# Patient Record
Sex: MALE | Race: ASIAN | NOT HISPANIC OR LATINO
[De-identification: names, ages, dates, MRNs, and addresses within clinical notes are randomized per-mention and may not be internally consistent; named-entity substitution may affect disease eponyms.]

---

## 2014-04-15 RX ORDER — GABAPENTIN 400 MG/1
1 CAPSULE ORAL
Qty: 0 | Refills: 0 | DISCHARGE
Start: 2014-04-15

## 2017-01-26 ENCOUNTER — APPOINTMENT (OUTPATIENT)
Dept: ELECTROPHYSIOLOGY | Facility: CLINIC | Age: 67
End: 2017-01-26

## 2017-02-23 ENCOUNTER — APPOINTMENT (OUTPATIENT)
Dept: ELECTROPHYSIOLOGY | Facility: CLINIC | Age: 67
End: 2017-02-23

## 2017-04-20 ENCOUNTER — APPOINTMENT (OUTPATIENT)
Dept: ELECTROPHYSIOLOGY | Facility: CLINIC | Age: 67
End: 2017-04-20

## 2017-04-20 ENCOUNTER — NON-APPOINTMENT (OUTPATIENT)
Age: 67
End: 2017-04-20

## 2017-04-20 VITALS
BODY MASS INDEX: 20.61 KG/M2 | SYSTOLIC BLOOD PRESSURE: 148 MMHG | DIASTOLIC BLOOD PRESSURE: 74 MMHG | OXYGEN SATURATION: 96 % | HEIGHT: 68 IN | WEIGHT: 136 LBS | HEART RATE: 77 BPM

## 2017-07-25 ENCOUNTER — APPOINTMENT (OUTPATIENT)
Dept: ELECTROPHYSIOLOGY | Facility: CLINIC | Age: 67
End: 2017-07-25

## 2017-11-14 ENCOUNTER — NON-APPOINTMENT (OUTPATIENT)
Age: 67
End: 2017-11-14

## 2017-11-14 ENCOUNTER — APPOINTMENT (OUTPATIENT)
Dept: ELECTROPHYSIOLOGY | Facility: CLINIC | Age: 67
End: 2017-11-14
Payer: COMMERCIAL

## 2017-11-14 VITALS
HEART RATE: 78 BPM | SYSTOLIC BLOOD PRESSURE: 162 MMHG | OXYGEN SATURATION: 98 % | HEIGHT: 68 IN | DIASTOLIC BLOOD PRESSURE: 73 MMHG | WEIGHT: 135 LBS | BODY MASS INDEX: 20.46 KG/M2

## 2017-11-14 VITALS — DIASTOLIC BLOOD PRESSURE: 87 MMHG | SYSTOLIC BLOOD PRESSURE: 159 MMHG | HEART RATE: 77 BPM

## 2017-11-14 PROCEDURE — 93284 PRGRMG EVAL IMPLANTABLE DFB: CPT

## 2018-03-16 ENCOUNTER — APPOINTMENT (OUTPATIENT)
Dept: ELECTROPHYSIOLOGY | Facility: CLINIC | Age: 68
End: 2018-03-16
Payer: COMMERCIAL

## 2018-03-16 VITALS
OXYGEN SATURATION: 98 % | HEART RATE: 81 BPM | SYSTOLIC BLOOD PRESSURE: 154 MMHG | DIASTOLIC BLOOD PRESSURE: 81 MMHG | WEIGHT: 133 LBS | BODY MASS INDEX: 20.22 KG/M2

## 2018-03-16 PROCEDURE — 93284 PRGRMG EVAL IMPLANTABLE DFB: CPT

## 2018-08-20 ENCOUNTER — APPOINTMENT (OUTPATIENT)
Dept: ELECTROPHYSIOLOGY | Facility: CLINIC | Age: 68
End: 2018-08-20
Payer: COMMERCIAL

## 2018-08-20 VITALS — OXYGEN SATURATION: 99 % | DIASTOLIC BLOOD PRESSURE: 80 MMHG | HEART RATE: 73 BPM | SYSTOLIC BLOOD PRESSURE: 152 MMHG

## 2018-08-20 PROCEDURE — 93284 PRGRMG EVAL IMPLANTABLE DFB: CPT

## 2019-02-25 ENCOUNTER — APPOINTMENT (OUTPATIENT)
Dept: ELECTROPHYSIOLOGY | Facility: CLINIC | Age: 69
End: 2019-02-25

## 2021-01-11 ENCOUNTER — NON-APPOINTMENT (OUTPATIENT)
Age: 71
End: 2021-01-11

## 2021-01-11 ENCOUNTER — APPOINTMENT (OUTPATIENT)
Dept: OPHTHALMOLOGY | Facility: CLINIC | Age: 71
End: 2021-01-11
Payer: COMMERCIAL

## 2021-01-11 PROCEDURE — 99202 OFFICE O/P NEW SF 15 MIN: CPT

## 2021-01-11 PROCEDURE — 99072 ADDL SUPL MATRL&STAF TM PHE: CPT

## 2021-01-12 ENCOUNTER — NON-APPOINTMENT (OUTPATIENT)
Age: 71
End: 2021-01-12

## 2021-01-12 ENCOUNTER — APPOINTMENT (OUTPATIENT)
Dept: OPHTHALMOLOGY | Facility: CLINIC | Age: 71
End: 2021-01-12
Payer: COMMERCIAL

## 2021-01-12 PROCEDURE — 99072 ADDL SUPL MATRL&STAF TM PHE: CPT

## 2021-01-12 PROCEDURE — 92012 INTRM OPH EXAM EST PATIENT: CPT

## 2021-01-14 ENCOUNTER — NON-APPOINTMENT (OUTPATIENT)
Age: 71
End: 2021-01-14

## 2021-01-14 ENCOUNTER — APPOINTMENT (OUTPATIENT)
Dept: OPHTHALMOLOGY | Facility: CLINIC | Age: 71
End: 2021-01-14
Payer: COMMERCIAL

## 2021-01-14 PROCEDURE — 92012 INTRM OPH EXAM EST PATIENT: CPT

## 2021-01-14 PROCEDURE — 99072 ADDL SUPL MATRL&STAF TM PHE: CPT

## 2021-01-19 ENCOUNTER — APPOINTMENT (OUTPATIENT)
Dept: OPHTHALMOLOGY | Facility: CLINIC | Age: 71
End: 2021-01-19

## 2021-02-11 ENCOUNTER — APPOINTMENT (OUTPATIENT)
Dept: OPHTHALMOLOGY | Facility: CLINIC | Age: 71
End: 2021-02-11
Payer: COMMERCIAL

## 2021-02-11 ENCOUNTER — NON-APPOINTMENT (OUTPATIENT)
Age: 71
End: 2021-02-11

## 2021-02-11 PROCEDURE — 92014 COMPRE OPH EXAM EST PT 1/>: CPT

## 2021-02-11 PROCEDURE — 99072 ADDL SUPL MATRL&STAF TM PHE: CPT

## 2021-02-11 PROCEDURE — 92250 FUNDUS PHOTOGRAPHY W/I&R: CPT

## 2021-12-07 ENCOUNTER — NON-APPOINTMENT (OUTPATIENT)
Age: 71
End: 2021-12-07

## 2021-12-07 ENCOUNTER — APPOINTMENT (OUTPATIENT)
Dept: ELECTROPHYSIOLOGY | Facility: CLINIC | Age: 71
End: 2021-12-07
Payer: COMMERCIAL

## 2021-12-07 VITALS — DIASTOLIC BLOOD PRESSURE: 65 MMHG | OXYGEN SATURATION: 97 % | HEART RATE: 80 BPM | SYSTOLIC BLOOD PRESSURE: 147 MMHG

## 2021-12-07 DIAGNOSIS — Z95.810 PRESENCE OF AUTOMATIC (IMPLANTABLE) CARDIAC DEFIBRILLATOR: ICD-10-CM

## 2021-12-07 DIAGNOSIS — I47.1 SUPRAVENTRICULAR TACHYCARDIA: ICD-10-CM

## 2021-12-07 PROCEDURE — 93000 ELECTROCARDIOGRAM COMPLETE: CPT | Mod: 59

## 2021-12-07 PROCEDURE — 93284 PRGRMG EVAL IMPLANTABLE DFB: CPT

## 2022-03-10 ENCOUNTER — APPOINTMENT (OUTPATIENT)
Dept: ELECTROPHYSIOLOGY | Facility: CLINIC | Age: 72
End: 2022-03-10

## 2022-12-12 ENCOUNTER — APPOINTMENT (OUTPATIENT)
Dept: ELECTROPHYSIOLOGY | Facility: CLINIC | Age: 72
End: 2022-12-12

## 2022-12-12 ENCOUNTER — NON-APPOINTMENT (OUTPATIENT)
Age: 72
End: 2022-12-12

## 2022-12-12 PROCEDURE — 93296 REM INTERROG EVL PM/IDS: CPT

## 2022-12-12 PROCEDURE — 93295 DEV INTERROG REMOTE 1/2/MLT: CPT

## 2022-12-13 ENCOUNTER — APPOINTMENT (OUTPATIENT)
Dept: ELECTROPHYSIOLOGY | Facility: CLINIC | Age: 72
End: 2022-12-13

## 2022-12-13 ENCOUNTER — NON-APPOINTMENT (OUTPATIENT)
Age: 72
End: 2022-12-13

## 2022-12-13 VITALS
BODY MASS INDEX: 20.31 KG/M2 | WEIGHT: 134 LBS | OXYGEN SATURATION: 100 % | DIASTOLIC BLOOD PRESSURE: 66 MMHG | HEART RATE: 75 BPM | SYSTOLIC BLOOD PRESSURE: 146 MMHG | HEIGHT: 68 IN

## 2022-12-13 PROCEDURE — 93000 ELECTROCARDIOGRAM COMPLETE: CPT | Mod: 59

## 2022-12-13 PROCEDURE — 93284 PRGRMG EVAL IMPLANTABLE DFB: CPT

## 2022-12-16 ENCOUNTER — OUTPATIENT (OUTPATIENT)
Dept: OUTPATIENT SERVICES | Facility: HOSPITAL | Age: 72
LOS: 1 days | End: 2022-12-16
Payer: COMMERCIAL

## 2022-12-16 ENCOUNTER — NON-APPOINTMENT (OUTPATIENT)
Age: 72
End: 2022-12-16

## 2022-12-16 ENCOUNTER — OUTPATIENT (OUTPATIENT)
Dept: OUTPATIENT SERVICES | Facility: HOSPITAL | Age: 72
LOS: 1 days | End: 2022-12-16

## 2022-12-16 VITALS
HEART RATE: 75 BPM | HEIGHT: 64 IN | SYSTOLIC BLOOD PRESSURE: 123 MMHG | RESPIRATION RATE: 14 BRPM | OXYGEN SATURATION: 98 % | WEIGHT: 134.92 LBS | TEMPERATURE: 99 F | DIASTOLIC BLOOD PRESSURE: 71 MMHG

## 2022-12-16 DIAGNOSIS — Z95.1 PRESENCE OF AORTOCORONARY BYPASS GRAFT: Chronic | ICD-10-CM

## 2022-12-16 DIAGNOSIS — T82.110A BREAKDOWN (MECHANICAL) OF CARDIAC ELECTRODE, INITIAL ENCOUNTER: ICD-10-CM

## 2022-12-16 DIAGNOSIS — Z01.818 ENCOUNTER FOR OTHER PREPROCEDURAL EXAMINATION: ICD-10-CM

## 2022-12-16 DIAGNOSIS — Z95.810 PRESENCE OF AUTOMATIC (IMPLANTABLE) CARDIAC DEFIBRILLATOR: Chronic | ICD-10-CM

## 2022-12-16 DIAGNOSIS — Z11.52 ENCOUNTER FOR SCREENING FOR COVID-19: ICD-10-CM

## 2022-12-16 DIAGNOSIS — Z29.9 ENCOUNTER FOR PROPHYLACTIC MEASURES, UNSPECIFIED: ICD-10-CM

## 2022-12-16 LAB
ANION GAP SERPL CALC-SCNC: 12 MMOL/L — SIGNIFICANT CHANGE UP (ref 5–17)
BLD GP AB SCN SERPL QL: NEGATIVE — SIGNIFICANT CHANGE UP
BUN SERPL-MCNC: 26 MG/DL — HIGH (ref 7–23)
CALCIUM SERPL-MCNC: 9.3 MG/DL — SIGNIFICANT CHANGE UP (ref 8.4–10.5)
CHLORIDE SERPL-SCNC: 106 MMOL/L — SIGNIFICANT CHANGE UP (ref 96–108)
CO2 SERPL-SCNC: 21 MMOL/L — LOW (ref 22–31)
CREAT SERPL-MCNC: 1.2 MG/DL — SIGNIFICANT CHANGE UP (ref 0.5–1.3)
EGFR: 63 ML/MIN/1.73M2 — SIGNIFICANT CHANGE UP
GLUCOSE SERPL-MCNC: 138 MG/DL — HIGH (ref 70–99)
HCT VFR BLD CALC: 35.8 % — LOW (ref 39–50)
HGB BLD-MCNC: 11.4 G/DL — LOW (ref 13–17)
MCHC RBC-ENTMCNC: 30.1 PG — SIGNIFICANT CHANGE UP (ref 27–34)
MCHC RBC-ENTMCNC: 31.8 GM/DL — LOW (ref 32–36)
MCV RBC AUTO: 94.5 FL — SIGNIFICANT CHANGE UP (ref 80–100)
NRBC # BLD: 0 /100 WBCS — SIGNIFICANT CHANGE UP (ref 0–0)
PLATELET # BLD AUTO: 238 K/UL — SIGNIFICANT CHANGE UP (ref 150–400)
POTASSIUM SERPL-MCNC: 4.3 MMOL/L — SIGNIFICANT CHANGE UP (ref 3.5–5.3)
POTASSIUM SERPL-SCNC: 4.3 MMOL/L — SIGNIFICANT CHANGE UP (ref 3.5–5.3)
RBC # BLD: 3.79 M/UL — LOW (ref 4.2–5.8)
RBC # FLD: 13.5 % — SIGNIFICANT CHANGE UP (ref 10.3–14.5)
RH IG SCN BLD-IMP: POSITIVE — SIGNIFICANT CHANGE UP
SODIUM SERPL-SCNC: 139 MMOL/L — SIGNIFICANT CHANGE UP (ref 135–145)
WBC # BLD: 7.16 K/UL — SIGNIFICANT CHANGE UP (ref 3.8–10.5)
WBC # FLD AUTO: 7.16 K/UL — SIGNIFICANT CHANGE UP (ref 3.8–10.5)

## 2022-12-16 PROCEDURE — 86901 BLOOD TYPING SEROLOGIC RH(D): CPT

## 2022-12-16 PROCEDURE — 71046 X-RAY EXAM CHEST 2 VIEWS: CPT | Mod: 26

## 2022-12-16 PROCEDURE — 71046 X-RAY EXAM CHEST 2 VIEWS: CPT

## 2022-12-16 PROCEDURE — C9803: CPT

## 2022-12-16 PROCEDURE — U0005: CPT

## 2022-12-16 PROCEDURE — U0003: CPT

## 2022-12-16 PROCEDURE — 86923 COMPATIBILITY TEST ELECTRIC: CPT

## 2022-12-16 PROCEDURE — 86850 RBC ANTIBODY SCREEN: CPT

## 2022-12-16 PROCEDURE — 36415 COLL VENOUS BLD VENIPUNCTURE: CPT

## 2022-12-16 PROCEDURE — 83036 HEMOGLOBIN GLYCOSYLATED A1C: CPT

## 2022-12-16 PROCEDURE — G0463: CPT

## 2022-12-16 PROCEDURE — 80048 BASIC METABOLIC PNL TOTAL CA: CPT

## 2022-12-16 PROCEDURE — 86900 BLOOD TYPING SEROLOGIC ABO: CPT

## 2022-12-16 PROCEDURE — 85027 COMPLETE CBC AUTOMATED: CPT

## 2022-12-16 RX ORDER — LIDOCAINE HCL 20 MG/ML
0.2 VIAL (ML) INJECTION ONCE
Refills: 0 | Status: DISCONTINUED | OUTPATIENT
Start: 2023-01-03 | End: 2023-01-03

## 2022-12-16 RX ORDER — CHLORHEXIDINE GLUCONATE 213 G/1000ML
1 SOLUTION TOPICAL ONCE
Refills: 0 | Status: DISCONTINUED | OUTPATIENT
Start: 2023-01-03 | End: 2023-01-04

## 2022-12-16 RX ORDER — SODIUM CHLORIDE 9 MG/ML
3 INJECTION INTRAMUSCULAR; INTRAVENOUS; SUBCUTANEOUS EVERY 8 HOURS
Refills: 0 | Status: DISCONTINUED | OUTPATIENT
Start: 2023-01-03 | End: 2023-01-03

## 2022-12-16 RX ORDER — CEFUROXIME AXETIL 250 MG
1500 TABLET ORAL ONCE
Refills: 0 | Status: DISCONTINUED | OUTPATIENT
Start: 2023-01-03 | End: 2023-01-04

## 2022-12-16 NOTE — H&P PST ADULT - ASSESSMENT
DASI score 7.25   CAPRINI VTE 2.0 SCORE [CLOT updated 2019]    AGE RELATED RISK FACTORS                                                       MOBILITY RELATED FACTORS  [ ] Age 41-60 years                                            (1 Point)                    [ ] Bed rest                                                        (1 Point)  [ X] Age: 61-74 years                                           (2 Points)                  [ ] Plaster cast                                                   (2 Points)  [ ] Age= 75 years                                              (3 Points)                    [ ] Bed bound for more than 72 hours                 (2 Points)    DISEASE RELATED RISK FACTORS                                               GENDER SPECIFIC FACTORS  [ ] Edema in the lower extremities                       (1 Point)              [ ] Pregnancy                                                     (1 Point)  [ ] Varicose veins                                               (1 Point)                     [ ] Post-partum < 6 weeks                                   (1 Point)             [ ] BMI > 25 Kg/m2                                            (1 Point)                     [ ] Hormonal therapy  or oral contraception          (1 Point)                 [ ] Sepsis (in the previous month)                        (1 Point)               [ ] History of pregnancy complications                 (1 point)  [ ] Pneumonia or serious lung disease                                               [ ] Unexplained or recurrent                     (1 Point)           (in the previous month)                               (1 Point)  [ ] Abnormal pulmonary function test                     (1 Point)                 SURGERY RELATED RISK FACTORS  [ ] Acute myocardial infarction                              (1 Point)               [ ]  Section                                             (1 Point)  [ ] Congestive heart failure (in the previous month)  (1 Point)      [ ] Minor surgery                                                  (1 Point)   [ ] Inflammatory bowel disease                             (1 Point)               [ ] Arthroscopic surgery                                        (2 Points)  [ ] Central venous access                                      (2 Points)                [ X] General surgery lasting more than 45 minutes (2 points)  [ ] Malignancy- Present or previous                   (2 Points)                [ ] Elective arthroplasty                                         (5 points)    [ ] Stroke (in the previous month)                          (5 Points)                                                                                                                                                           HEMATOLOGY RELATED FACTORS                                                 TRAUMA RELATED RISK FACTORS  [ ] Prior episodes of VTE                                     (3 Points)                [ ] Fracture of the hip, pelvis, or leg                       (5 Points)  [ ] Positive family history for VTE                         (3 Points)             [ ] Acute spinal cord injury (in the previous month)  (5 Points)  [ ] Prothrombin 04487 A                                     (3 Points)               [ ] Paralysis  (less than 1 month)                             (5 Points)  [ ] Factor V Leiden                                             (3 Points)                  [ ] Multiple Trauma within 1 month                        (5 Points)  [ ] Lupus anticoagulants                                     (3 Points)                                                           [ ] Anticardiolipin antibodies                               (3 Points)                                                       [ ] High homocysteine in the blood                      (3 Points)                                             [ ] Other congenital or acquired thrombophilia      (3 Points)                                                [ ] Heparin induced thrombocytopenia                  (3 Points)                                     Total Score [   4       ]

## 2022-12-16 NOTE — H&P PST ADULT - HISTORY OF PRESENT ILLNESS
73 y/o M with PMHx of CAD s/p CABG (2004 at [facility]), HTN, HLD, DM2. Admission 2014 for acute decompensated HF requiring intubation and CCU. EP was consulted for severe CHF and pt found to be a candidate for biV ICD.   S/p ICD interrogation 12/13/22 prompted by an episode of NSVT c/w V lead noise on remote monitoring.  Now scheduled for ICD lead extraction/ICD reimplantation 12/20/22.  Patient denies SOB, CP, palpitation, blood in the stool or urine, N/V/D/C, HA, dizziness, seizures.    Hx of Covid-19 Infx. 9/2021- Asymptomatic - tested because of expossure  Covid swab on 12/16/22

## 2022-12-16 NOTE — H&P PST ADULT - PROBLEM SELECTOR PLAN 2
Procedure: ICD lead extraction/ICD reimplantation 12/20/22  PST labs pending  AC: aspirin 81mg- may continue  Medication changes: Repaglinide last dose 12/19/22  Patient takes all meds after lunch and after dinner- advised to jerry serna the morning of procedure- everything else as usual  AICD interrogation- in chart  Covid swab 12/16/22      STOP-BANG score:

## 2022-12-16 NOTE — H&P PST ADULT - CONSTITUTIONAL
[facility] Emergency Department    CHIEF COMPLAINT  No chief complaint on file. SHARED SERVICE VISIT  I have seen and evaluated this patient with my supervising physician, Dr. Amena Fernandes. HISTORY OF PRESENT ILLNESS  Tiny Belle is a 26 y.o. male who presents to the ED complaining of a 10 week hx of intermittent substernal \"pressure and/or dullness\" accompanied by shortness of breath, palpitations, presyncope upon standing, and chills. Denies cough/hemoptysis, nausea, vomiting, association with meals, ripping or tearing sensation, unilateral calf pain/swelling, fever or sweats. Patient states he has had an intentional weight loss of 56 lbs in last 65 days but \"combining keto with 1 meal a day. Patient reports that he took 3-4 full ASA today. Patient does not have a cardiologist and has never had a cardiac work-up with stress test and echo. No other complaints, modifying factors or associated symptoms. HEART SCORE:    History: 0  EC  Patient Age: 1  *Risk factors for Atherosclerotic disease: Cigarette smoking  Risk Factors: 1  Troponin: 0  Heart Score Total: 2      Heart score: 2. This falls under the following category: Score of 0-3, which indicates a very low risk for major adverse cardiac event and supports early discharge          Nursing notes reviewed. No past medical history on file. No past surgical history on file. No family history on file.   Social History     Socioeconomic History    Marital status: Single     Spouse name: Not on file    Number of children: Not on file    Years of education: Not on file    Highest education level: Not on file   Occupational History    Not on file   Social Needs    Financial resource strain: Not on file    Food insecurity     Worry: Not on file     Inability: Not on file    Transportation needs     Medical: Not on file     Non-medical: Not on file   Tobacco Use    Smoking status: Current Every Day Smoker Packs/day: 1.00    Smokeless tobacco: Never Used   Substance and Sexual Activity    Alcohol use: Yes     Comment: social    Drug use: No    Sexual activity: Not on file   Lifestyle    Physical activity     Days per week: Not on file     Minutes per session: Not on file    Stress: Not on file   Relationships    Social connections     Talks on phone: Not on file     Gets together: Not on file     Attends Temple service: Not on file     Active member of club or organization: Not on file     Attends meetings of clubs or organizations: Not on file     Relationship status: Not on file    Intimate partner violence     Fear of current or ex partner: Not on file     Emotionally abused: Not on file     Physically abused: Not on file     Forced sexual activity: Not on file   Other Topics Concern    Not on file   Social History Narrative    Not on file     No current facility-administered medications for this encounter. No current outpatient medications on file. No Known Allergies    REVIEW OF SYSTEMS  10 systems reviewed, pertinent positives per HPI otherwise noted to be negative    PHYSICAL EXAM  There were no vitals taken for this visit. GENERAL APPEARANCE: Awake and alert. Cooperative. No apparent distress. Non-- toxic in appearance. HEAD: Normocephalic. Atraumatic. EYES: PERRL. EOM's grossly intact. ENT: Mucous membranes are moist.   NECK: Supple. No JVD  HEART: RRR. No murmurs.  + S1-S2.  LUNGS: Respirations unlabored. CTAB. Good air exchange. Speaking comfortably in full sentences. ABDOMEN: Soft. Non-distended. Non-tender. No guarding or rebound. There is no midline pulsatile abdominal mass. No masses. No organomegaly. EXTREMITIES: No peripheral edema. Moves all extremities equally. All extremities neurovascularly intact. Radial pulses equal bilaterally. SKIN: Warm and dry. No acute rashes. NEUROLOGICAL: Alert and oriented. CN's 2-12 intact. No gross facial drooping.  Strength 5/5, sensation intact. PSYCHIATRIC: Normal mood and affect. RADIOLOGY  No results found. ED COURSE  Patient did not require analgesia while in the emergency department. Triage vitals stable but with systolic hypertension at 245/69 mmHg. Labs Ordered:  CBC: Negative for leukocytosis or anemia; unremarkable  CMP: Creatinine 0.6; otherwise unremarkable  Troponin: <0.01      EKG: As interpreted by EMD. Normal sinus rhythm. Normal ECG. No previous ECG available. Imaging ordered:  CXR: No acute cardiopulmonary process. Interventions:  Patient was not given  mg as he reports that he took several for aspirin 325 mg tablets today. Nitro was not given as patient is low risk and is not presently having be substernal chest pain. Reevaluation:  Patient resting comfortably on stretcher in the emergency department. Vital signs remained stable. No complaints of. A discussion was had with Mr. Michael Hills regarding chest pain, lightheadedness, palpitations, and intention to discharge. Risk management discussed and shared decision making had with patient and/or surrogate. All questions were answered. Patient will follow up with PCP and cardiology-referrals given for both, for further evaluation/treatment. All questions answered. Patient will return to ED for new/worsening symptoms. Patient is agreeable with this plan. Patient was not sent home with the prescriptions. CRITICAL CARE TIME  0 minutes of critical care time spent not including separately billable procedures. MDM  Patient presents to the emergency department with chest pain.  Alternate diagnoses are less likely based on history and physical. Alternate diagnoses are less likely based on history and physical. Considered myocardial infarction, aortic dissection, pulmonary embolism, tension pneumothorax, endocarditis, GERD, and pneumonia but less likely based on history and physical. Considered MI but no ischemic changes on EKG and no elevation in troponin. Considered aortic dissection but less likely as no radiation of pain into back with ripping/tearing sensation, there are equal radial pulses bilaterally, and there is no midline pulsatile abdominal mass. Considered  pulmonary embolism but less likely as no cough or hemoptysis and no DVT symptoms. Considered tension pneumothorax but less likely as no unilaterally diminished breath sounds or tracheal deviation. Considered endocarditis but less likely as no fever, murmur, or IV drug use. Considered GERD but less likely as no postprandial epigastric abdominal/throat burning. Considered pneumonia but less likely as no fever, chills, sweats, leukocytosis, cough, and no radiographic evidence of consolidation or infiltrates on imaging-CXR. My attending physician and I feel that the patient is safe and appropriate for discharge to home at this time as the patient is risk stratified into the very low risk for adverse cardiac events in the near future by a heart score of 2, has no pneumonia or signs of infection as evidenced by no infiltrates or consolidation on CXR and no leukocytosis. His vital signs are stable without hypotension, tachycardia, tachypnea or hypoxia. However, return to the emergency department for new or worsening symptoms including chest pain accompanied by nausea, vomiting, dizziness/presyncope, shortness of breath, and/or diaphoresis/sweats. He will follow up with PCP and cardiology-referred for further evaluation and treatment. Patient is agreeable with the plan for discharge and follow-up. Clinical Impression:  Chest pain  Lightheaded  Palpitations      DISPOSITION  Discharged      This chart was created using Dragon dictation. Every effort was made by myself to ensure accuracy, however due to limitations of this technology errors may be present.       ELIZABETH Benitez - CNP  11/17/20 [id number] normal/well-groomed/no distress

## 2022-12-16 NOTE — H&P PST ADULT - NSICDXPASTMEDICALHX_GEN_ALL_CORE_FT
PAST MEDICAL HISTORY:  Coronary artery disease     Diabetes type 2, controlled     Hyperlipidemia     Hypertension     S/P ICD (internal cardiac defibrillator) procedure

## 2022-12-16 NOTE — H&P PST ADULT - ATTENDING COMMENTS
long discussion of options, procedures, outcomes and risk  patient and son decided to proceed with extraction and reimplant

## 2022-12-17 LAB
A1C WITH ESTIMATED AVERAGE GLUCOSE RESULT: 6 % — HIGH (ref 4–5.6)
ESTIMATED AVERAGE GLUCOSE: 126 MG/DL — HIGH (ref 68–114)
SARS-COV-2 RNA SPEC QL NAA+PROBE: SIGNIFICANT CHANGE UP

## 2022-12-27 ENCOUNTER — APPOINTMENT (OUTPATIENT)
Dept: ELECTROPHYSIOLOGY | Facility: CLINIC | Age: 72
End: 2022-12-27

## 2022-12-30 ENCOUNTER — OUTPATIENT (OUTPATIENT)
Dept: OUTPATIENT SERVICES | Facility: HOSPITAL | Age: 72
LOS: 1 days | End: 2022-12-30
Payer: COMMERCIAL

## 2022-12-30 DIAGNOSIS — Z95.1 PRESENCE OF AORTOCORONARY BYPASS GRAFT: Chronic | ICD-10-CM

## 2022-12-30 DIAGNOSIS — Z11.52 ENCOUNTER FOR SCREENING FOR COVID-19: ICD-10-CM

## 2022-12-30 DIAGNOSIS — Z95.810 PRESENCE OF AUTOMATIC (IMPLANTABLE) CARDIAC DEFIBRILLATOR: Chronic | ICD-10-CM

## 2022-12-30 LAB — SARS-COV-2 RNA SPEC QL NAA+PROBE: SIGNIFICANT CHANGE UP

## 2023-01-03 ENCOUNTER — OUTPATIENT (OUTPATIENT)
Dept: INPATIENT UNIT | Facility: HOSPITAL | Age: 73
LOS: 1 days | Discharge: HOME | End: 2023-01-03
Payer: COMMERCIAL

## 2023-01-03 ENCOUNTER — TRANSCRIPTION ENCOUNTER (OUTPATIENT)
Age: 73
End: 2023-01-03

## 2023-01-03 VITALS
HEIGHT: 64 IN | RESPIRATION RATE: 17 BRPM | HEART RATE: 65 BPM | SYSTOLIC BLOOD PRESSURE: 176 MMHG | DIASTOLIC BLOOD PRESSURE: 73 MMHG | WEIGHT: 134.26 LBS | OXYGEN SATURATION: 98 % | TEMPERATURE: 98 F

## 2023-01-03 DIAGNOSIS — T82.110A BREAKDOWN (MECHANICAL) OF CARDIAC ELECTRODE, INITIAL ENCOUNTER: ICD-10-CM

## 2023-01-03 DIAGNOSIS — Z95.810 PRESENCE OF AUTOMATIC (IMPLANTABLE) CARDIAC DEFIBRILLATOR: Chronic | ICD-10-CM

## 2023-01-03 DIAGNOSIS — Z95.1 PRESENCE OF AORTOCORONARY BYPASS GRAFT: Chronic | ICD-10-CM

## 2023-01-03 PROCEDURE — 76000 FLUOROSCOPY <1 HR PHYS/QHP: CPT

## 2023-01-03 PROCEDURE — 93010 ELECTROCARDIOGRAM REPORT: CPT

## 2023-01-03 PROCEDURE — C9803: CPT

## 2023-01-03 PROCEDURE — U0005: CPT

## 2023-01-03 PROCEDURE — U0003: CPT

## 2023-01-03 DEVICE — IMPLANTABLE DEVICE
Type: IMPLANTABLE DEVICE | Status: NON-FUNCTIONAL
Removed: 2023-01-03

## 2023-01-03 DEVICE — SURGICEL FIBRILLAR 2 X 4"
Type: IMPLANTABLE DEVICE | Status: NON-FUNCTIONAL
Removed: 2023-01-03

## 2023-01-03 DEVICE — KIT BRIDGE ACESSORY
Type: IMPLANTABLE DEVICE | Status: NON-FUNCTIONAL
Removed: 2023-01-03

## 2023-01-03 DEVICE — ENVELOPE TYRX ABSORBABLE ANTIBACTERIAL LG
Type: IMPLANTABLE DEVICE | Status: NON-FUNCTIONAL
Removed: 2023-01-03

## 2023-01-03 DEVICE — KIT A-LINE 1LUM 20G X 12CM SAFE KIT
Type: IMPLANTABLE DEVICE | Status: NON-FUNCTIONAL
Removed: 2023-01-03

## 2023-01-03 DEVICE — DEVICE LOCKING LOCKING LLD EZ CLEARS
Type: IMPLANTABLE DEVICE | Status: NON-FUNCTIONAL
Removed: 2023-01-03

## 2023-01-03 RX ORDER — INSULIN LISPRO 100/ML
VIAL (ML) SUBCUTANEOUS AT BEDTIME
Refills: 0 | Status: DISCONTINUED | OUTPATIENT
Start: 2023-01-03 | End: 2023-01-04

## 2023-01-03 RX ORDER — DEXTROSE 50 % IN WATER 50 %
25 SYRINGE (ML) INTRAVENOUS ONCE
Refills: 0 | Status: DISCONTINUED | OUTPATIENT
Start: 2023-01-03 | End: 2023-01-04

## 2023-01-03 RX ORDER — MULTIVIT-MIN/FERROUS GLUCONATE 9 MG/15 ML
1 LIQUID (ML) ORAL DAILY
Refills: 0 | Status: DISCONTINUED | OUTPATIENT
Start: 2023-01-03 | End: 2023-01-04

## 2023-01-03 RX ORDER — OXYCODONE HYDROCHLORIDE 5 MG/1
10 TABLET ORAL EVERY 4 HOURS
Refills: 0 | Status: DISCONTINUED | OUTPATIENT
Start: 2023-01-03 | End: 2023-01-04

## 2023-01-03 RX ORDER — TRAZODONE HCL 50 MG
50 TABLET ORAL ONCE
Refills: 0 | Status: COMPLETED | OUTPATIENT
Start: 2023-01-03 | End: 2023-01-03

## 2023-01-03 RX ORDER — SODIUM CHLORIDE 9 MG/ML
1000 INJECTION, SOLUTION INTRAVENOUS
Refills: 0 | Status: DISCONTINUED | OUTPATIENT
Start: 2023-01-03 | End: 2023-01-04

## 2023-01-03 RX ORDER — FUROSEMIDE 40 MG
40 TABLET ORAL DAILY
Refills: 0 | Status: DISCONTINUED | OUTPATIENT
Start: 2023-01-03 | End: 2023-01-04

## 2023-01-03 RX ORDER — CARVEDILOL PHOSPHATE 80 MG/1
25 CAPSULE, EXTENDED RELEASE ORAL EVERY 12 HOURS
Refills: 0 | Status: DISCONTINUED | OUTPATIENT
Start: 2023-01-03 | End: 2023-01-04

## 2023-01-03 RX ORDER — OXYCODONE HYDROCHLORIDE 5 MG/1
5 TABLET ORAL EVERY 6 HOURS
Refills: 0 | Status: DISCONTINUED | OUTPATIENT
Start: 2023-01-03 | End: 2023-01-04

## 2023-01-03 RX ORDER — FOLIC ACID 0.8 MG
1 TABLET ORAL DAILY
Refills: 0 | Status: DISCONTINUED | OUTPATIENT
Start: 2023-01-03 | End: 2023-01-04

## 2023-01-03 RX ORDER — GABAPENTIN 400 MG/1
400 CAPSULE ORAL
Refills: 0 | Status: DISCONTINUED | OUTPATIENT
Start: 2023-01-03 | End: 2023-01-04

## 2023-01-03 RX ORDER — GABAPENTIN 400 MG/1
100 CAPSULE ORAL
Refills: 0 | Status: DISCONTINUED | OUTPATIENT
Start: 2023-01-03 | End: 2023-01-03

## 2023-01-03 RX ORDER — INSULIN LISPRO 100/ML
VIAL (ML) SUBCUTANEOUS
Refills: 0 | Status: DISCONTINUED | OUTPATIENT
Start: 2023-01-03 | End: 2023-01-04

## 2023-01-03 RX ORDER — DEXTROSE 50 % IN WATER 50 %
15 SYRINGE (ML) INTRAVENOUS ONCE
Refills: 0 | Status: DISCONTINUED | OUTPATIENT
Start: 2023-01-03 | End: 2023-01-04

## 2023-01-03 RX ORDER — ASPIRIN/CALCIUM CARB/MAGNESIUM 324 MG
81 TABLET ORAL DAILY
Refills: 0 | Status: DISCONTINUED | OUTPATIENT
Start: 2023-01-04 | End: 2023-01-04

## 2023-01-03 RX ORDER — HYDROMORPHONE HYDROCHLORIDE 2 MG/ML
0.5 INJECTION INTRAMUSCULAR; INTRAVENOUS; SUBCUTANEOUS
Refills: 0 | Status: DISCONTINUED | OUTPATIENT
Start: 2023-01-03 | End: 2023-01-03

## 2023-01-03 RX ORDER — FENTANYL CITRATE 50 UG/ML
25 INJECTION INTRAVENOUS
Refills: 0 | Status: DISCONTINUED | OUTPATIENT
Start: 2023-01-03 | End: 2023-01-03

## 2023-01-03 RX ORDER — DEXTROSE 50 % IN WATER 50 %
12.5 SYRINGE (ML) INTRAVENOUS ONCE
Refills: 0 | Status: DISCONTINUED | OUTPATIENT
Start: 2023-01-03 | End: 2023-01-04

## 2023-01-03 RX ORDER — GLUCAGON INJECTION, SOLUTION 0.5 MG/.1ML
1 INJECTION, SOLUTION SUBCUTANEOUS ONCE
Refills: 0 | Status: DISCONTINUED | OUTPATIENT
Start: 2023-01-03 | End: 2023-01-04

## 2023-01-03 RX ORDER — ATORVASTATIN CALCIUM 80 MG/1
80 TABLET, FILM COATED ORAL AT BEDTIME
Refills: 0 | Status: DISCONTINUED | OUTPATIENT
Start: 2023-01-03 | End: 2023-01-04

## 2023-01-03 RX ORDER — ONDANSETRON 8 MG/1
4 TABLET, FILM COATED ORAL EVERY 6 HOURS
Refills: 0 | Status: DISCONTINUED | OUTPATIENT
Start: 2023-01-03 | End: 2023-01-03

## 2023-01-03 RX ORDER — ACETAMINOPHEN 500 MG
650 TABLET ORAL EVERY 6 HOURS
Refills: 0 | Status: DISCONTINUED | OUTPATIENT
Start: 2023-01-03 | End: 2023-01-04

## 2023-01-03 RX ORDER — LOSARTAN POTASSIUM 100 MG/1
100 TABLET, FILM COATED ORAL DAILY
Refills: 0 | Status: DISCONTINUED | OUTPATIENT
Start: 2023-01-03 | End: 2023-01-04

## 2023-01-03 RX ADMIN — HYDROMORPHONE HYDROCHLORIDE 0.5 MILLIGRAM(S): 2 INJECTION INTRAMUSCULAR; INTRAVENOUS; SUBCUTANEOUS at 18:55

## 2023-01-03 RX ADMIN — OXYCODONE HYDROCHLORIDE 5 MILLIGRAM(S): 5 TABLET ORAL at 23:03

## 2023-01-03 RX ADMIN — Medication 50 MILLIGRAM(S): at 22:17

## 2023-01-03 RX ADMIN — CARVEDILOL PHOSPHATE 25 MILLIGRAM(S): 80 CAPSULE, EXTENDED RELEASE ORAL at 22:17

## 2023-01-03 RX ADMIN — HYDROMORPHONE HYDROCHLORIDE 0.5 MILLIGRAM(S): 2 INJECTION INTRAMUSCULAR; INTRAVENOUS; SUBCUTANEOUS at 19:15

## 2023-01-03 RX ADMIN — LOSARTAN POTASSIUM 100 MILLIGRAM(S): 100 TABLET, FILM COATED ORAL at 22:17

## 2023-01-03 RX ADMIN — OXYCODONE HYDROCHLORIDE 5 MILLIGRAM(S): 5 TABLET ORAL at 23:33

## 2023-01-03 RX ADMIN — ATORVASTATIN CALCIUM 80 MILLIGRAM(S): 80 TABLET, FILM COATED ORAL at 22:16

## 2023-01-03 RX ADMIN — GABAPENTIN 400 MILLIGRAM(S): 400 CAPSULE ORAL at 22:17

## 2023-01-03 NOTE — DISCHARGE NOTE PROVIDER - NSDCCPTREATMENT_GEN_ALL_CORE_FT
PRINCIPAL PROCEDURE  Procedure: Insertion or revision, ICD  Findings and Treatment: ICD lead extraction and LV lead implant

## 2023-01-03 NOTE — PRE-ANESTHESIA EVALUATION ADULT - NSANTHPMHFT_GEN_ALL_CORE
71 yo M h/o HTN, HLD, DM, CAD s/p CABG c/b SVG graft stenosis and decompensated HF in 2014 with clinical recovery (walks 5 miles / day) here for PM lead extraction for malfunctioning lead  ET > 4 METS but no recent TTE, no TTE on file after 2014 but has followed up with cardiologist

## 2023-01-03 NOTE — DISCHARGE NOTE PROVIDER - NSDCCPCAREPLAN_GEN_ALL_CORE_FT
PRINCIPAL DISCHARGE DIAGNOSIS  Diagnosis: Breakdown (mechanical) of cardiac electrode, initial encounter  Assessment and Plan of Treatment: Your incision is closed with either surgical tape, staples or a surgical glue  - DO NOT scrub, rub, or pick at the site  AFTER 3 days, you may shower   - Use mild sop and gentle water stream, then pat dry with a towel   - DO NOT apply lotions, powders, ointments, or perfumes to the incision  DO NOT soak your incisional site in water fo 4-6 weeks (no baths, swimming, hot tub...)  Wear loose clothing around site for 1-2 weeks  IF surgical tape was used DO NOT remove the strips, they will fall off after 7days, if glue was used, it will naturally fall off within 3 weeks  if staples were used, they will be removed in 7-10 days by your doctor  ACTIVITY:  for 2 weeks AFTER  your procedure  - DO NOT RAISE your arm above shoulder level (on the same side of your incision)  for 4 weeks AFTER your procedure   - DO NOT LIFT anything 10 lbs or heavier (on the side of your implant)   - certain activities may be limited longer, those that involve swinging your arm, and will be discussed with your EP doctor  DO NOT DRIVE until your EP Doctor or nurse practitioner/ physician assistant states it is safe to do so  A follow up appointment in 7-14 days will be arranged before your discharge  ID CARD:   you will receive an ID CARD and device company booklet   - please carry that card with you at all times  ***CALL YOUR DOCTOR ***  IF you have fever, chills, body aches, or severe pain, swelling, redness, heat, yellow drainage from your incision site  IF bleeding  or significant new swelling from your puncture site  IF your experience lightheadedness, dizziness, or fainting spell  IF you experience a single shock (like being kicked/punched in the chest) and feel okay, please call the clinic  IF you experience multiple shocks or  single shock and are NOT feeling well, have someone take you to the emergency room or call 911  IF unable to get in contact with your doctor, you may call the Cardiology Office at [facility] at [phone]      SECONDARY DISCHARGE DIAGNOSES  Diagnosis: Diabetes  Assessment and Plan of Treatment: HgA1C this admission was 6.0  Make sure you get your HgA1c checked every three months.  If you take oral diabetes medications, check your blood glucose two times a day.  If you take insulin, check your blood glucose before meals and at bedtime.  It's important not to skip any meals.  Keep a log of your blood glucose results and always take it with you to your doctor appointments.  Keep a list of your current medications including injectables and over the counter medications and bring this medication list with you to all your doctor appointments.  If you have not seen your opthalmologist this year call for appointment.  Check your feet daily for redness, sores, or openings. Do not self treat. If no improvement in two days call your primary care physician for an appointment.  Low blood sugar (hypoglycemia) is a blood sugar below 70mg/dl. Check your blood sugar if you feel signs/symptoms of hypoglycemia. If your blood sugar is below 70 take 15 grams of carbohydrates (ex 4 oz of apple juice, 3-4 glucosr tablets, or 4-6 oz of regular soda) wait 15 minutes and repeat blood sugar to make sure it comes up above 70.  If your blood sugar is above 70 and you are due for a meal, have a meal.  If you are not due for a meal have a snack.  This snack helps keeps your blood sugar at a safe range.      Diagnosis: HTN (hypertension)  Assessment and Plan of Treatment: Hypertension, also known simply as "high blood pressure" is very common, however can lead to many significant complications if left uncontrolled. When the blood pressure is elevated, the force the blood puts on the walls of the arteries is high and can lead to artery damage. Also, when the heart muscle has to pump blood against a high blood pressure, it thickens and enlarges, just like any muscle does when it has to do more work (think of a weight ). When the blood pressure is very high, people may feel a headache or tired. Some people can feel pounding in their head or have blurry vision. Hearing the heart beating in the ear especially at night can be a sign of high blood pressure. Eventually, symptoms of stroke, heart attack, heart failure or irregular heartbeats can occur  - Exercise: Doing cardiovascular exercise such as running, biking or swimming at least 30 minutes per day most days of the week is recommended to help keep blood pressure healthy  - Lose weight: Maintaining a normal BMI (body mass index) is very important in keeping blood pressure readings normal   - Avoid salt: Sodium in the diet increases the blood pressure in many ways. Salt comes in many foods, so just because you don't add salt to your food it does not mean that you are eating a low salt diet. Read labels and keep sodium intake to less than 2000 mg per day   - Avoid alcohol: Even 1 or 2 alcoholic drinks can significantly increase blood pressure   - DASH Diet: The DASH diet has been shown to reduce blood pressure   - Take all medication as prescribed.   - Follow up with your medical doctor for routine blood pressure monitoring at your next visit.   - Notify your doctor if you have any of the following symptoms:    - Dizziness, Lightheadedness, Blurry vision, Headache, Chest pain, Shortness of breath

## 2023-01-03 NOTE — DISCHARGE NOTE PROVIDER - NSDCMRMEDTOKEN_GEN_ALL_CORE_FT
Aspirin Enteric Coated 81 mg oral delayed release tablet: 1 tab(s) orally once a day  atorvastatin 80 mg oral tablet: 1 tab(s) orally once a day (at bedtime)  Coreg 25 mg oral tablet: 1 tab(s) orally 2 times a day  after lunch and dinner  folic acid 0.8 mg oral tablet: 1 tab(s) orally once a day  furosemide 40 mg oral tablet: 1 tab(s) orally once a day  gabapentin 100 mg oral capsule: 1 cap(s) orally 2 times a day  gabapentin 300 mg oral capsule: 1 cap(s) orally 2 times a day  losartan 100 mg oral tablet: 1 tab(s) orally 2 times a day  After lunch and dinner  Multiple Vitamins with Minerals oral tablet: 1 tab(s) orally once a day  repaglinide 1 mg oral tablet: 1 tab(s) orally 3 times a day (before meals)   acetaminophen 325 mg oral tablet: 2 tab(s) orally every 6 hours, As needed, Mild Pain (1 - 3)  Aspirin Enteric Coated 81 mg oral delayed release tablet: 1 tab(s) orally once a day  atorvastatin 80 mg oral tablet: 1 tab(s) orally once a day (at bedtime)  Coreg 25 mg oral tablet: 1 tab(s) orally 2 times a day  after lunch and dinner  folic acid 0.8 mg oral tablet: 1 tab(s) orally once a day  furosemide 40 mg oral tablet: 1 tab(s) orally once a day  gabapentin 100 mg oral capsule: 1 cap(s) orally 2 times a day  gabapentin 300 mg oral capsule: 1 cap(s) orally 2 times a day  losartan 100 mg oral tablet: 1 tab(s) orally 2 times a day  After lunch and dinner  Multiple Vitamins with Minerals oral tablet: 1 tab(s) orally once a day  repaglinide 1 mg oral tablet: 1 tab(s) orally 3 times a day (before meals)   acetaminophen 325 mg oral tablet: 2 tab(s) orally every 6 hours, As needed, Mild Pain (1 - 3)  Aspirin Enteric Coated 81 mg oral delayed release tablet: 1 tab(s) orally once a day  atorvastatin 80 mg oral tablet: 1 tab(s) orally once a day (at bedtime)  Coreg 25 mg oral tablet: 1 tab(s) orally 2 times a day  after lunch and dinner  folic acid 0.8 mg oral tablet: 1 tab(s) orally once a day  furosemide 40 mg oral tablet: 1 tab(s) orally once a day  gabapentin 100 mg oral capsule: 1 cap(s) orally 2 times a day  gabapentin 300 mg oral capsule: 1 cap(s) orally 2 times a day  losartan 100 mg oral tablet: 1 tab(s) orally 2 times a day  After lunch and dinner  Multiple Vitamins with Minerals oral tablet: 1 tab(s) orally once a day  oxycodone-acetaminophen 5 mg-325 mg oral tablet: 1 tab(s) orally every 6 hours MDD:4  repaglinide 1 mg oral tablet: 1 tab(s) orally 3 times a day (before meals)

## 2023-01-03 NOTE — DISCHARGE NOTE PROVIDER - PROVIDER TOKENS
PROVIDER:[TOKEN:[[id number]]] PROVIDER:[TOKEN:[[id number]],SCHEDULEDAPPT:[01/23/2023],SCHEDULEDAPPTTIME:[10:40 AM]]

## 2023-01-03 NOTE — DISCHARGE NOTE PROVIDER - CARE PROVIDERS DIRECT ADDRESSES
[email or web address]

## 2023-01-03 NOTE — DISCHARGE NOTE PROVIDER - NSDCFUSCHEDAPPT_GEN_ALL_CORE_FT
[facility] Physician Replaced by [facility]  ELECTROPH [address]  Scheduled Appointment: 03/13/2023     [facility]  ELECTROPH [address]  Scheduled Appointment: 01/23/2023    [facility]  ELECTROPH [address]  Scheduled Appointment: 03/13/2023

## 2023-01-03 NOTE — PRE-OP CHECKLIST - SITE MARKED BY SURGEON
Benefits, risks, and possible complications of procedure explained to patient/caregiver who verbalized understanding and gave verbal consent.
n/a

## 2023-01-03 NOTE — PRE-OP CHECKLIST - BP NONINVASIVE SYSTOLIC (MM HG)
Chart reviewed by Heart Failure Nurse Navigator. Heart Failure database completed. EF:  Echo Pending; previous EF 15-20% (11/23/21)     ACEi/ARB/ARNi: currently on hold    BB: currently contraindicated d/t RV dysfunction    Aldosterone Antagonist: Eplerenone 50 mg QD    Obstructive Sleep Apnea Screening: pt uses Cpap   STOP-BANG score:   Referred to Sleep Medicine:     CRT AICD reimplant 10/21. NYHA Functional Class IV on admission. Heart Failure Teach Back in Patient Education. Heart Failure Avoiding Triggers on Discharge Instructions. Cardiologist: [facility]    [employer] documentation request sent to Suzanna Schwartz NP via [employer] discharge follow up phone call to be made within 48-72 hours of discharge.
[phone]

## 2023-01-03 NOTE — PATIENT PROFILE ADULT - FALL HARM RISK - UNIVERSAL INTERVENTIONS
Bed in lowest position, wheels locked, appropriate side rails in place/Call bell, personal items and telephone in reach/Instruct patient to call for assistance before getting out of bed or chair/Non-slip footwear when patient is out of bed/[place] to call system/Physically safe environment - no spills, clutter or unnecessary equipment/Purposeful Proactive Rounding/Room/bathroom lighting operational, light cord in reach

## 2023-01-03 NOTE — DISCHARGE NOTE PROVIDER - HOSPITAL COURSE
73 y/o M with PMHx of CAD s/p CABG (2004 at [facility]), HTN, HLD, DM2. Admission 2014 for acute decompensated HF requiring intubation and CCU. EP was consulted for severe CHF and pt found to be a candidate for biV ICD. S/p ICD interrogation 12/13/22 prompted by an episode of NSVT c/w V lead noise on remote monitoring.  Now scheduled for ICD lead extraction/ICD reimplantation. Patient denies SOB, CP, palpitation, blood in the stool or urine, N/V/D/C, HA, dizziness, seizures.    Patient successfully underwent ICD lead extraction and ICD reimplantation with Dr. Ivey on 1/3/23.   71 y/o M with PMHx of CAD s/p CABG (2004 at [facility]), HTN, HLD, DM2. Admission 2014 for acute decompensated HF requiring intubation and CCU. EP was consulted for severe CHF and pt found to be a candidate for biV ICD. S/p ICD interrogation 12/13/22 prompted by an episode of NSVT c/w V lead noise on remote monitoring.  Now scheduled for ICD lead extraction/ICD reimplantation. Patient denies SOB, CP, palpitation, blood in the stool or urine, N/V/D/C, HA, dizziness, seizures.    Patient successfully underwent ICD lead extraction and ICD reimplantation with Dr. Ivey on 1/3/23. PA/LAT chest xray with good lead placement.    73 y/o M with PMHx of CAD s/p CABG (2004 at [facility]), HTN, HLD, DM2. Admission 2014 for acute decompensated HF requiring intubation and CCU. EP was consulted for severe CHF and pt found to be a candidate for biV ICD. S/p ICD interrogation 12/13/22 prompted by an episode of NSVT c/w V lead noise on remote monitoring.  Now scheduled for ICD lead extraction/ICD reimplantation. Patient denies SOB, CP, palpitation, blood in the stool or urine, N/V/D/C, HA, dizziness, seizures.    Patient successfully underwent ICD lead extraction and ICD reimplantation with Dr. Ivey on 1/3/23. PA/LAT chest xray with good lead placement.     S/P lead extraction with reimplant  site looks good (after removal of pocket press)  device interrogation with good function  CXR with good lead position  OK for DC, F/U device clinic 10-14days  Chloé Ivey MD

## 2023-01-03 NOTE — DISCHARGE NOTE PROVIDER - CARE PROVIDER_API CALL
Chloé Ivey (MD)  Cardiac Electrophysiology; Cardiovascular Disease; Internal Medicine  [address] [place] [address]  Phone: [phone]  Fax: [phone]  Follow Up Time:    Chloé Ivey)  Cardiac Electrophysiology; Cardiovascular Disease; Internal Medicine  [address] [place] [address]  Phone: [phone]  Fax: [phone]  Scheduled Appointment: 01/23/2023 10:40 AM

## 2023-01-03 NOTE — DISCHARGE NOTE PROVIDER - NSDCFUADDINST_GEN_ALL_CORE_FT
Your incision is closed with either surgical tape, staples or a surgical glue  - DO NOT scrub, rub, or pick at the site    AFTER 3 days, you may shower   - Use mild sop and gentle water stream, then pat dry with a towel   - DO NOT apply lotions, powders, ointments, or perfumes to the incision    DO NOT soak your incisional site in water fo 4-6 weeks (no baths, swimming, hot tub...)  Wear loose clothing around site for 1-2 weeks  IF surgical tape was used DO NOT remove the strips, they will fall off after 7days, if glue was used, it will naturally fall off within 3 weeks  if staples were used, they will be removed in 7-10 days by your doctor    ACTIVITY:  for 2 weeks AFTER  your procedure  - DO NOT RAISE your arm above shoulder level (on the same side of your incision)  for 4 weeks AFTER your procedure   - DO NOT LIFT anything 10 lbs or heavier (on the side of your implant)   - certain activities may be limited longer, those that involve swinging your arm, and will be discussed with your EP doctor  DO NOT DRIVE until your EP Doctor or nurse practitioner/ physician assistant states it is safe to do so  A follow up appointment in 7-14 days will be arranged before your discharge    ID CARD:   you will receive an ID CARD and device company booklet   - please carry that card with you at all times    ***CALL YOUR DOCTOR ***  IF you have fever, chills, body aches, or severe pain, swelling, redness, heat, yellow drainage from your incision site  IF bleeding  or significant new swelling from your puncture site  IF your experience lightheadedness, dizziness, or fainting spell  IF you experience a single shock (like being kicked/punched in the chest) and feel okay, please call the clinic  IF you experience multiple shocks or  single shock and are NOT feeling well, have someone take you to the emergency room or call 911  IF unable to get in contact with your doctor, you may call the Cardiology Office at [facility] at [phone]

## 2023-01-04 ENCOUNTER — TRANSCRIPTION ENCOUNTER (OUTPATIENT)
Age: 73
End: 2023-01-04

## 2023-01-04 VITALS
HEART RATE: 66 BPM | SYSTOLIC BLOOD PRESSURE: 133 MMHG | RESPIRATION RATE: 18 BRPM | DIASTOLIC BLOOD PRESSURE: 71 MMHG | TEMPERATURE: 98 F | OXYGEN SATURATION: 100 %

## 2023-01-04 DIAGNOSIS — E11.9 TYPE 2 DIABETES MELLITUS WITHOUT COMPLICATIONS: ICD-10-CM

## 2023-01-04 DIAGNOSIS — I25.10 ATHEROSCLEROTIC HEART DISEASE OF NATIVE CORONARY ARTERY WITHOUT ANGINA PECTORIS: ICD-10-CM

## 2023-01-04 DIAGNOSIS — Z95.810 PRESENCE OF AUTOMATIC (IMPLANTABLE) CARDIAC DEFIBRILLATOR: ICD-10-CM

## 2023-01-04 DIAGNOSIS — I10 ESSENTIAL (PRIMARY) HYPERTENSION: ICD-10-CM

## 2023-01-04 LAB
ANION GAP SERPL CALC-SCNC: 10 MMOL/L — SIGNIFICANT CHANGE UP (ref 5–17)
BUN SERPL-MCNC: 12 MG/DL — SIGNIFICANT CHANGE UP (ref 7–23)
CALCIUM SERPL-MCNC: 8.8 MG/DL — SIGNIFICANT CHANGE UP (ref 8.4–10.5)
CHLORIDE SERPL-SCNC: 106 MMOL/L — SIGNIFICANT CHANGE UP (ref 96–108)
CO2 SERPL-SCNC: 25 MMOL/L — SIGNIFICANT CHANGE UP (ref 22–31)
CREAT SERPL-MCNC: 0.96 MG/DL — SIGNIFICANT CHANGE UP (ref 0.5–1.3)
EGFR: 83 ML/MIN/1.73M2 — SIGNIFICANT CHANGE UP
GLUCOSE SERPL-MCNC: 104 MG/DL — HIGH (ref 70–99)
HCT VFR BLD CALC: 31.4 % — LOW (ref 39–50)
HGB BLD-MCNC: 10 G/DL — LOW (ref 13–17)
MCHC RBC-ENTMCNC: 29.9 PG — SIGNIFICANT CHANGE UP (ref 27–34)
MCHC RBC-ENTMCNC: 31.8 GM/DL — LOW (ref 32–36)
MCV RBC AUTO: 94 FL — SIGNIFICANT CHANGE UP (ref 80–100)
NRBC # BLD: 0 /100 WBCS — SIGNIFICANT CHANGE UP (ref 0–0)
PLATELET # BLD AUTO: 206 K/UL — SIGNIFICANT CHANGE UP (ref 150–400)
POTASSIUM SERPL-MCNC: 3.8 MMOL/L — SIGNIFICANT CHANGE UP (ref 3.5–5.3)
POTASSIUM SERPL-SCNC: 3.8 MMOL/L — SIGNIFICANT CHANGE UP (ref 3.5–5.3)
RBC # BLD: 3.34 M/UL — LOW (ref 4.2–5.8)
RBC # FLD: 14 % — SIGNIFICANT CHANGE UP (ref 10.3–14.5)
SODIUM SERPL-SCNC: 141 MMOL/L — SIGNIFICANT CHANGE UP (ref 135–145)
WBC # BLD: 7.1 K/UL — SIGNIFICANT CHANGE UP (ref 3.8–10.5)
WBC # FLD AUTO: 7.1 K/UL — SIGNIFICANT CHANGE UP (ref 3.8–10.5)

## 2023-01-04 PROCEDURE — 86923 COMPATIBILITY TEST ELECTRIC: CPT

## 2023-01-04 PROCEDURE — C1882: CPT

## 2023-01-04 PROCEDURE — 33249 INSJ/RPLCMT DEFIB W/LEAD(S): CPT

## 2023-01-04 PROCEDURE — C1892: CPT

## 2023-01-04 PROCEDURE — C1773: CPT

## 2023-01-04 PROCEDURE — C1769: CPT

## 2023-01-04 PROCEDURE — C9399: CPT

## 2023-01-04 PROCEDURE — 85027 COMPLETE CBC AUTOMATED: CPT

## 2023-01-04 PROCEDURE — 80048 BASIC METABOLIC PNL TOTAL CA: CPT

## 2023-01-04 PROCEDURE — 33244 REMOVE ELCTRD TRANSVENOUSLY: CPT

## 2023-01-04 PROCEDURE — 36415 COLL VENOUS BLD VENIPUNCTURE: CPT

## 2023-01-04 PROCEDURE — 71046 X-RAY EXAM CHEST 2 VIEWS: CPT | Mod: 26

## 2023-01-04 PROCEDURE — C1889: CPT

## 2023-01-04 PROCEDURE — 93005 ELECTROCARDIOGRAM TRACING: CPT

## 2023-01-04 PROCEDURE — 82962 GLUCOSE BLOOD TEST: CPT

## 2023-01-04 PROCEDURE — C1894: CPT

## 2023-01-04 PROCEDURE — 33241 REMOVE PULSE GENERATOR: CPT

## 2023-01-04 PROCEDURE — C1777: CPT

## 2023-01-04 PROCEDURE — 71046 X-RAY EXAM CHEST 2 VIEWS: CPT

## 2023-01-04 PROCEDURE — 93010 ELECTROCARDIOGRAM REPORT: CPT

## 2023-01-04 RX ORDER — ACETAMINOPHEN 500 MG
2 TABLET ORAL
Qty: 0 | Refills: 0 | DISCHARGE
Start: 2023-01-04

## 2023-01-04 RX ORDER — POTASSIUM CHLORIDE 20 MEQ
20 PACKET (EA) ORAL ONCE
Refills: 0 | Status: COMPLETED | OUTPATIENT
Start: 2023-01-04 | End: 2023-01-04

## 2023-01-04 RX ORDER — BACITRACIN ZINC 500 UNIT/G
1 OINTMENT IN PACKET (EA) TOPICAL
Refills: 0 | Status: DISCONTINUED | OUTPATIENT
Start: 2023-01-04 | End: 2023-01-04

## 2023-01-04 RX ORDER — OXYCODONE AND ACETAMINOPHEN 5; 325 MG/1; MG/1
1 TABLET ORAL
Qty: 12 | Refills: 0
Start: 2023-01-04 | End: 2023-01-06

## 2023-01-04 RX ADMIN — CARVEDILOL PHOSPHATE 25 MILLIGRAM(S): 80 CAPSULE, EXTENDED RELEASE ORAL at 10:56

## 2023-01-04 RX ADMIN — GABAPENTIN 400 MILLIGRAM(S): 400 CAPSULE ORAL at 05:13

## 2023-01-04 RX ADMIN — Medication 81 MILLIGRAM(S): at 05:13

## 2023-01-04 RX ADMIN — Medication 20 MILLIEQUIVALENT(S): at 05:10

## 2023-01-04 RX ADMIN — OXYCODONE HYDROCHLORIDE 5 MILLIGRAM(S): 5 TABLET ORAL at 10:55

## 2023-01-04 NOTE — CHART NOTE - NSCHARTNOTEFT_GEN_A_CORE
POST PROCEDURE CHEST XRAY:    This am PA/LAT xray reviewed with Dr Siegel. Lead placement is satisfactory.    Yahir Rodriguez, NP   x [phone]

## 2023-01-04 NOTE — PROGRESS NOTE ADULT - PROBLEM SELECTOR PLAN 2
Consistent Carb diet  Humalog insulin sliding scale with finger sticks before meals and QHS  Confirm HgbA1c level

## 2023-01-04 NOTE — PROGRESS NOTE ADULT - ASSESSMENT
72 M with PMHx of CAD s/p CABG (2004 at [facility]), HTN, HLD, DM2, Admission 2014 for acute decompensated HF requiring intubation and CCU. EP was consulted for severe CHF and pt found to be a candidate for biV ICD. S/p ICD interrogation 12/13/22 prompted by an episode of NSVT c/w V lead noise on remote monitoring. Now s/p ICD lead extraction/ICD reimplantation with Dr. Ivey on 1/3/23

## 2023-01-04 NOTE — PROGRESS NOTE ADULT - PROBLEM SELECTOR PLAN 1
s/p ICD lead extraction and ICD reimplantation with Dr. Ivey on 1/3/23  Close monitoring for bleeding, hematoma or edema  Pain control s/p ICD lead extraction and ICD reimplantation with Dr. Ivey on 1/3/23  CXR PA/LAT ordered to rule out pneumothorax and check leads position  Close monitoring for bleeding, hematoma or edema  Pain control

## 2023-01-04 NOTE — DISCHARGE NOTE NURSING/CASE MANAGEMENT/SOCIAL WORK - PATIENT PORTAL LINK FT
You can access the FollowMyHealth Patient Portal offered by [facility] by registering at the following website: [email or web address]. By joining [facility]’s FollowMyHealth portal, you will also be able to view your health information using other applications (apps) compatible with our system.

## 2023-01-04 NOTE — PROGRESS NOTE ADULT - SUBJECTIVE AND OBJECTIVE BOX
HPI:  73 y/o M with PMHx of CAD s/p CABG (2004 at [facility]), HTN, HLD, DM2. Admission 2014 for acute decompensated HF requiring intubation and CCU. EP was consulted for severe CHF and pt found to be a candidate for biV ICD. S/p ICD interrogation 12/13/22 prompted by an episode of NSVT c/w V lead noise on remote monitoring.  Now scheduled for ICD lead extraction/ICD reimplantation.               Subjective:   Patient feels well- no current complaints- Denies chest pain, shortness of breath. Denies pain, numbness, tingling or swelling around left chest wall or R groin access site     MEDICATIONS  (STANDING):  aspirin enteric coated 81 milliGRAM(s) Oral daily  atorvastatin 80 milliGRAM(s) Oral at bedtime  carvedilol 25 milliGRAM(s) Oral every 12 hours  cefuroxime  IVPB 1500 milliGRAM(s) IV Intermittent once  chlorhexidine 2% Cloths 1 Application(s) Topical once  dextrose 5%. 1000 milliLiter(s) (50 mL/Hr) IV Continuous <Continuous>  dextrose 5%. 1000 milliLiter(s) (100 mL/Hr) IV Continuous <Continuous>  dextrose 50% Injectable 25 Gram(s) IV Push once  dextrose 50% Injectable 12.5 Gram(s) IV Push once  dextrose 50% Injectable 25 Gram(s) IV Push once  folic acid 1 milliGRAM(s) Oral daily  furosemide    Tablet 40 milliGRAM(s) Oral daily  gabapentin 400 milliGRAM(s) Oral two times a day  glucagon  Injectable 1 milliGRAM(s) IntraMuscular once  insulin lispro (ADMELOG) corrective regimen sliding scale   SubCutaneous three times a day before meals  insulin lispro (ADMELOG) corrective regimen sliding scale   SubCutaneous at bedtime  losartan 100 milliGRAM(s) Oral daily  multivitamin/minerals 1 Tablet(s) Oral daily    MEDICATIONS  (PRN):  acetaminophen     Tablet .. 650 milliGRAM(s) Oral every 6 hours PRN Mild Pain (1 - 3)  dextrose Oral Gel 15 Gram(s) Oral once PRN Blood Glucose LESS THAN 70 milliGRAM(s)/deciliter  oxyCODONE    IR 10 milliGRAM(s) Oral every 4 hours PRN Severe Pain (7 - 10)  oxyCODONE    IR 5 milliGRAM(s) Oral every 6 hours PRN Moderate Pain (4 - 6)      Objective:  Vital Signs Last 24 Hrs  T(C): 36.6 (03 Jan 2023 20:46), Max: 36.6 (03 Jan 2023 13:22)  T(F): 97.9 (03 Jan 2023 20:46), Max: 97.9 (03 Jan 2023 13:22)  HR: 56 (04 Jan 2023 02:15) (49 - 74)  BP: 106/47 (04 Jan 2023 02:15) (93/51 - 176/73)  BP(mean): 61 (04 Jan 2023 02:15) (60 - 104)  RR: 17 (04 Jan 2023 02:15) (12 - 19)  SpO2: 98% (04 Jan 2023 02:15) (97% - 100%)    Parameters below as of 04 Jan 2023 02:15  Patient On (Oxygen Delivery Method): room air                        10.0   7.10  )-----------( 206      ( 04 Jan 2023 01:30 )             31.4     01-04    141  |  106  |  12  ----------------------------<  104<H>  3.8   |  25  |  0.96    Ca    8.8      04 Jan 2023 01:30    Physical Exam:  No apparent distress, alert and oriented times three, appropriate affect  JVD is not elevated, supple  Clear to auscultation with no wheezing, rhonchi or crackles  Regular rate and rhythm with no murmur, rub or gallop  Pocket press on left chest wall without hematoma or edema  Positive bowel sounds, soft, non-tender, non-distended, no masses/guarding or rebound tenderness  Right groin: Soft, non tender, no bleeding or hematoma, clean/dry/intact- RLE+2 palpable pulses   HPI:  73 y/o M with PMHx of CAD s/p CABG (2004 at [facility]), HTN, HLD, DM2. Admission 2014 for acute decompensated HF requiring intubation and CCU. EP was consulted for severe CHF and pt found to be a candidate for biV ICD. S/p ICD interrogation 12/13/22 prompted by an episode of NSVT c/w V lead noise on remote monitoring.  Now s/p ICD lead extraction/ICD reimplantation on 1/3/23 with Dr. Ivey              Subjective:   Patient feels well- no current complaints- Denies chest pain, shortness of breath. Denies pain, numbness, tingling or swelling around left chest wall or R groin access site     MEDICATIONS  (STANDING):  aspirin enteric coated 81 milliGRAM(s) Oral daily  atorvastatin 80 milliGRAM(s) Oral at bedtime  carvedilol 25 milliGRAM(s) Oral every 12 hours  cefuroxime  IVPB 1500 milliGRAM(s) IV Intermittent once  chlorhexidine 2% Cloths 1 Application(s) Topical once  dextrose 5%. 1000 milliLiter(s) (50 mL/Hr) IV Continuous <Continuous>  dextrose 5%. 1000 milliLiter(s) (100 mL/Hr) IV Continuous <Continuous>  dextrose 50% Injectable 25 Gram(s) IV Push once  dextrose 50% Injectable 12.5 Gram(s) IV Push once  dextrose 50% Injectable 25 Gram(s) IV Push once  folic acid 1 milliGRAM(s) Oral daily  furosemide    Tablet 40 milliGRAM(s) Oral daily  gabapentin 400 milliGRAM(s) Oral two times a day  glucagon  Injectable 1 milliGRAM(s) IntraMuscular once  insulin lispro (ADMELOG) corrective regimen sliding scale   SubCutaneous three times a day before meals  insulin lispro (ADMELOG) corrective regimen sliding scale   SubCutaneous at bedtime  losartan 100 milliGRAM(s) Oral daily  multivitamin/minerals 1 Tablet(s) Oral daily    MEDICATIONS  (PRN):  acetaminophen     Tablet .. 650 milliGRAM(s) Oral every 6 hours PRN Mild Pain (1 - 3)  dextrose Oral Gel 15 Gram(s) Oral once PRN Blood Glucose LESS THAN 70 milliGRAM(s)/deciliter  oxyCODONE    IR 10 milliGRAM(s) Oral every 4 hours PRN Severe Pain (7 - 10)  oxyCODONE    IR 5 milliGRAM(s) Oral every 6 hours PRN Moderate Pain (4 - 6)      Objective:  Vital Signs Last 24 Hrs  T(C): 36.6 (03 Jan 2023 20:46), Max: 36.6 (03 Jan 2023 13:22)  T(F): 97.9 (03 Jan 2023 20:46), Max: 97.9 (03 Jan 2023 13:22)  HR: 56 (04 Jan 2023 02:15) (49 - 74)  BP: 106/47 (04 Jan 2023 02:15) (93/51 - 176/73)  BP(mean): 61 (04 Jan 2023 02:15) (60 - 104)  RR: 17 (04 Jan 2023 02:15) (12 - 19)  SpO2: 98% (04 Jan 2023 02:15) (97% - 100%)    Parameters below as of 04 Jan 2023 02:15  Patient On (Oxygen Delivery Method): room air                        10.0   7.10  )-----------( 206      ( 04 Jan 2023 01:30 )             31.4     01-04    141  |  106  |  12  ----------------------------<  104<H>  3.8   |  25  |  0.96    Ca    8.8      04 Jan 2023 01:30    Physical Exam:  No apparent distress, alert and oriented times three, appropriate affect  JVD is not elevated, supple  Clear to auscultation with no wheezing, rhonchi or crackles  Regular rate and rhythm with no murmur, rub or gallop  Pocket press on left chest wall without hematoma or edema  Positive bowel sounds, soft, non-tender, non-distended, no masses/guarding or rebound tenderness  Right groin: Soft, non tender, no bleeding or hematoma, clean/dry/intact- RLE+2 palpable pulses

## 2023-01-04 NOTE — PROVIDER CONTACT NOTE (OTHER) - ASSESSMENT
AO4, VSS, no c/o pain upon palpation.  RACW soft, nontender; no redness.  No c/o chest pain, no s/s of distress noted.

## 2023-01-23 ENCOUNTER — APPOINTMENT (OUTPATIENT)
Dept: ELECTROPHYSIOLOGY | Facility: CLINIC | Age: 73
End: 2023-01-23
Payer: COMMERCIAL

## 2023-01-23 ENCOUNTER — NON-APPOINTMENT (OUTPATIENT)
Age: 73
End: 2023-01-23

## 2023-01-23 VITALS
HEART RATE: 64 BPM | DIASTOLIC BLOOD PRESSURE: 78 MMHG | OXYGEN SATURATION: 99 % | HEIGHT: 68 IN | SYSTOLIC BLOOD PRESSURE: 187 MMHG

## 2023-01-23 PROCEDURE — 99024 POSTOP FOLLOW-UP VISIT: CPT

## 2023-01-23 PROCEDURE — 93000 ELECTROCARDIOGRAM COMPLETE: CPT | Mod: 59

## 2023-01-23 PROCEDURE — 93284 PRGRMG EVAL IMPLANTABLE DFB: CPT

## 2023-04-24 ENCOUNTER — RESULT CHARGE (OUTPATIENT)
Age: 73
End: 2023-04-24

## 2023-04-25 ENCOUNTER — APPOINTMENT (OUTPATIENT)
Dept: ELECTROPHYSIOLOGY | Facility: CLINIC | Age: 73
End: 2023-04-25
Payer: COMMERCIAL

## 2023-04-25 ENCOUNTER — NON-APPOINTMENT (OUTPATIENT)
Age: 73
End: 2023-04-25

## 2023-04-25 VITALS
DIASTOLIC BLOOD PRESSURE: 85 MMHG | SYSTOLIC BLOOD PRESSURE: 172 MMHG | HEART RATE: 76 BPM | BODY MASS INDEX: 20.31 KG/M2 | WEIGHT: 134 LBS | HEIGHT: 68 IN | OXYGEN SATURATION: 96 %

## 2023-04-25 PROCEDURE — 93000 ELECTROCARDIOGRAM COMPLETE: CPT | Mod: 59

## 2023-04-25 PROCEDURE — 93284 PRGRMG EVAL IMPLANTABLE DFB: CPT

## 2023-04-28 ENCOUNTER — INPATIENT (INPATIENT)
Facility: HOSPITAL | Age: 73
LOS: 6 days | Discharge: NURSING, REHAB OR LONG-TERM CARE | DRG: 480 | End: 2023-05-05
Attending: HOSPITALIST | Admitting: HOSPITALIST
Payer: MEDICARE

## 2023-04-28 ENCOUNTER — TRANSCRIPTION ENCOUNTER (OUTPATIENT)
Age: 73
End: 2023-04-28

## 2023-04-28 VITALS
HEART RATE: 93 BPM | TEMPERATURE: 98 F | SYSTOLIC BLOOD PRESSURE: 181 MMHG | RESPIRATION RATE: 16 BRPM | DIASTOLIC BLOOD PRESSURE: 76 MMHG | OXYGEN SATURATION: 100 %

## 2023-04-28 DIAGNOSIS — E78.5 HYPERLIPIDEMIA, UNSPECIFIED: ICD-10-CM

## 2023-04-28 DIAGNOSIS — I25.10 ATHEROSCLEROTIC HEART DISEASE OF NATIVE CORONARY ARTERY WITHOUT ANGINA PECTORIS: ICD-10-CM

## 2023-04-28 DIAGNOSIS — Z95.810 PRESENCE OF AUTOMATIC (IMPLANTABLE) CARDIAC DEFIBRILLATOR: Chronic | ICD-10-CM

## 2023-04-28 DIAGNOSIS — S72.142A DISPLACED INTERTROCHANTERIC FRACTURE OF LEFT FEMUR, INITIAL ENCOUNTER FOR CLOSED FRACTURE: ICD-10-CM

## 2023-04-28 DIAGNOSIS — Z95.1 PRESENCE OF AORTOCORONARY BYPASS GRAFT: Chronic | ICD-10-CM

## 2023-04-28 DIAGNOSIS — Z95.0 PRESENCE OF CARDIAC PACEMAKER: Chronic | ICD-10-CM

## 2023-04-28 DIAGNOSIS — F10.239 ALCOHOL DEPENDENCE WITH WITHDRAWAL, UNSPECIFIED: ICD-10-CM

## 2023-04-28 DIAGNOSIS — I10 ESSENTIAL (PRIMARY) HYPERTENSION: ICD-10-CM

## 2023-04-28 DIAGNOSIS — E11.9 TYPE 2 DIABETES MELLITUS WITHOUT COMPLICATIONS: ICD-10-CM

## 2023-04-28 DIAGNOSIS — I50.22 CHRONIC SYSTOLIC (CONGESTIVE) HEART FAILURE: ICD-10-CM

## 2023-04-28 DIAGNOSIS — Z29.9 ENCOUNTER FOR PROPHYLACTIC MEASURES, UNSPECIFIED: ICD-10-CM

## 2023-04-28 DIAGNOSIS — S72.002A FRACTURE OF UNSPECIFIED PART OF NECK OF LEFT FEMUR, INITIAL ENCOUNTER FOR CLOSED FRACTURE: ICD-10-CM

## 2023-04-28 LAB
ALBUMIN SERPL ELPH-MCNC: 4.3 G/DL — SIGNIFICANT CHANGE UP (ref 3.3–5)
ALP SERPL-CCNC: 107 U/L — SIGNIFICANT CHANGE UP (ref 40–120)
ALT FLD-CCNC: 52 U/L — HIGH (ref 10–45)
ANION GAP SERPL CALC-SCNC: 12 MMOL/L — SIGNIFICANT CHANGE UP (ref 5–17)
APPEARANCE UR: CLEAR — SIGNIFICANT CHANGE UP
APTT BLD: 25.7 SEC — LOW (ref 27.5–35.5)
AST SERPL-CCNC: 48 U/L — HIGH (ref 10–40)
BACTERIA # UR AUTO: NEGATIVE — SIGNIFICANT CHANGE UP
BASOPHILS # BLD AUTO: 0.02 K/UL — SIGNIFICANT CHANGE UP (ref 0–0.2)
BASOPHILS NFR BLD AUTO: 0.2 % — SIGNIFICANT CHANGE UP (ref 0–2)
BILIRUB SERPL-MCNC: 0.4 MG/DL — SIGNIFICANT CHANGE UP (ref 0.2–1.2)
BILIRUB UR-MCNC: NEGATIVE — SIGNIFICANT CHANGE UP
BLD GP AB SCN SERPL QL: NEGATIVE — SIGNIFICANT CHANGE UP
BUN SERPL-MCNC: 20 MG/DL — SIGNIFICANT CHANGE UP (ref 7–23)
CALCIUM SERPL-MCNC: 9.1 MG/DL — SIGNIFICANT CHANGE UP (ref 8.4–10.5)
CHLORIDE SERPL-SCNC: 103 MMOL/L — SIGNIFICANT CHANGE UP (ref 96–108)
CO2 SERPL-SCNC: 23 MMOL/L — SIGNIFICANT CHANGE UP (ref 22–31)
COLOR SPEC: YELLOW — SIGNIFICANT CHANGE UP
CREAT SERPL-MCNC: 1.02 MG/DL — SIGNIFICANT CHANGE UP (ref 0.5–1.3)
DIFF PNL FLD: NEGATIVE — SIGNIFICANT CHANGE UP
EGFR: 77 ML/MIN/1.73M2 — SIGNIFICANT CHANGE UP
EOSINOPHIL # BLD AUTO: 0.04 K/UL — SIGNIFICANT CHANGE UP (ref 0–0.5)
EOSINOPHIL NFR BLD AUTO: 0.4 % — SIGNIFICANT CHANGE UP (ref 0–6)
EPI CELLS # UR: 0 /HPF — SIGNIFICANT CHANGE UP
ETHANOL SERPL-MCNC: 14 MG/DL — HIGH (ref 0–10)
GLUCOSE SERPL-MCNC: 85 MG/DL — SIGNIFICANT CHANGE UP (ref 70–99)
GLUCOSE UR QL: NEGATIVE — SIGNIFICANT CHANGE UP
HCT VFR BLD CALC: 36.3 % — LOW (ref 39–50)
HGB BLD-MCNC: 11.6 G/DL — LOW (ref 13–17)
HYALINE CASTS # UR AUTO: 0 /LPF — SIGNIFICANT CHANGE UP (ref 0–2)
IMM GRANULOCYTES NFR BLD AUTO: 0.4 % — SIGNIFICANT CHANGE UP (ref 0–0.9)
INR BLD: 0.91 RATIO — SIGNIFICANT CHANGE UP (ref 0.88–1.16)
KETONES UR-MCNC: NEGATIVE — SIGNIFICANT CHANGE UP
LEUKOCYTE ESTERASE UR-ACNC: NEGATIVE — SIGNIFICANT CHANGE UP
LYMPHOCYTES # BLD AUTO: 1.6 K/UL — SIGNIFICANT CHANGE UP (ref 1–3.3)
LYMPHOCYTES # BLD AUTO: 17.4 % — SIGNIFICANT CHANGE UP (ref 13–44)
MCHC RBC-ENTMCNC: 28.8 PG — SIGNIFICANT CHANGE UP (ref 27–34)
MCHC RBC-ENTMCNC: 32 GM/DL — SIGNIFICANT CHANGE UP (ref 32–36)
MCV RBC AUTO: 90.1 FL — SIGNIFICANT CHANGE UP (ref 80–100)
MONOCYTES # BLD AUTO: 0.66 K/UL — SIGNIFICANT CHANGE UP (ref 0–0.9)
MONOCYTES NFR BLD AUTO: 7.2 % — SIGNIFICANT CHANGE UP (ref 2–14)
NEUTROPHILS # BLD AUTO: 6.82 K/UL — SIGNIFICANT CHANGE UP (ref 1.8–7.4)
NEUTROPHILS NFR BLD AUTO: 74.4 % — SIGNIFICANT CHANGE UP (ref 43–77)
NITRITE UR-MCNC: NEGATIVE — SIGNIFICANT CHANGE UP
NRBC # BLD: 0 /100 WBCS — SIGNIFICANT CHANGE UP (ref 0–0)
PH UR: 6 — SIGNIFICANT CHANGE UP (ref 5–8)
PLATELET # BLD AUTO: 162 K/UL — SIGNIFICANT CHANGE UP (ref 150–400)
POTASSIUM SERPL-MCNC: 4.1 MMOL/L — SIGNIFICANT CHANGE UP (ref 3.5–5.3)
POTASSIUM SERPL-SCNC: 4.1 MMOL/L — SIGNIFICANT CHANGE UP (ref 3.5–5.3)
PROT SERPL-MCNC: 8.1 G/DL — SIGNIFICANT CHANGE UP (ref 6–8.3)
PROT UR-MCNC: ABNORMAL
PROTHROM AB SERPL-ACNC: 10.4 SEC — LOW (ref 10.5–13.4)
RBC # BLD: 4.03 M/UL — LOW (ref 4.2–5.8)
RBC # FLD: 14 % — SIGNIFICANT CHANGE UP (ref 10.3–14.5)
RBC CASTS # UR COMP ASSIST: 1 /HPF — SIGNIFICANT CHANGE UP (ref 0–4)
RH IG SCN BLD-IMP: POSITIVE — SIGNIFICANT CHANGE UP
SODIUM SERPL-SCNC: 138 MMOL/L — SIGNIFICANT CHANGE UP (ref 135–145)
SP GR SPEC: 1.02 — SIGNIFICANT CHANGE UP (ref 1.01–1.02)
UROBILINOGEN FLD QL: NEGATIVE — SIGNIFICANT CHANGE UP
WBC # BLD: 9.18 K/UL — SIGNIFICANT CHANGE UP (ref 3.8–10.5)
WBC # FLD AUTO: 9.18 K/UL — SIGNIFICANT CHANGE UP (ref 3.8–10.5)
WBC UR QL: 1 /HPF — SIGNIFICANT CHANGE UP (ref 0–5)

## 2023-04-28 PROCEDURE — 73552 X-RAY EXAM OF FEMUR 2/>: CPT | Mod: 26,LT

## 2023-04-28 PROCEDURE — 99223 1ST HOSP IP/OBS HIGH 75: CPT

## 2023-04-28 PROCEDURE — 71045 X-RAY EXAM CHEST 1 VIEW: CPT | Mod: 26

## 2023-04-28 PROCEDURE — 73502 X-RAY EXAM HIP UNI 2-3 VIEWS: CPT | Mod: 26,LT

## 2023-04-28 PROCEDURE — 99285 EMERGENCY DEPT VISIT HI MDM: CPT

## 2023-04-28 PROCEDURE — 73560 X-RAY EXAM OF KNEE 1 OR 2: CPT | Mod: 26,LT

## 2023-04-28 RX ORDER — OXYCODONE HYDROCHLORIDE 5 MG/1
5 TABLET ORAL EVERY 6 HOURS
Refills: 0 | Status: DISCONTINUED | OUTPATIENT
Start: 2023-04-28 | End: 2023-04-29

## 2023-04-28 RX ORDER — OXYCODONE HYDROCHLORIDE 5 MG/1
10 TABLET ORAL EVERY 6 HOURS
Refills: 0 | Status: DISCONTINUED | OUTPATIENT
Start: 2023-04-28 | End: 2023-04-29

## 2023-04-28 RX ORDER — SENNA PLUS 8.6 MG/1
2 TABLET ORAL AT BEDTIME
Refills: 0 | Status: DISCONTINUED | OUTPATIENT
Start: 2023-04-28 | End: 2023-04-29

## 2023-04-28 RX ORDER — GABAPENTIN 400 MG/1
300 CAPSULE ORAL
Refills: 0 | Status: DISCONTINUED | OUTPATIENT
Start: 2023-04-28 | End: 2023-04-29

## 2023-04-28 RX ORDER — ACETAMINOPHEN 500 MG
1000 TABLET ORAL ONCE
Refills: 0 | Status: COMPLETED | OUTPATIENT
Start: 2023-04-28 | End: 2023-04-28

## 2023-04-28 RX ORDER — POLYETHYLENE GLYCOL 3350 17 G/17G
17 POWDER, FOR SOLUTION ORAL DAILY
Refills: 0 | Status: DISCONTINUED | OUTPATIENT
Start: 2023-04-28 | End: 2023-04-29

## 2023-04-28 RX ORDER — FOLIC ACID 0.8 MG
1 TABLET ORAL DAILY
Refills: 0 | Status: DISCONTINUED | OUTPATIENT
Start: 2023-04-28 | End: 2023-04-29

## 2023-04-28 RX ORDER — LOSARTAN POTASSIUM 100 MG/1
100 TABLET, FILM COATED ORAL DAILY
Refills: 0 | Status: DISCONTINUED | OUTPATIENT
Start: 2023-04-28 | End: 2023-04-29

## 2023-04-28 RX ORDER — HEPARIN SODIUM 5000 [USP'U]/ML
5000 INJECTION INTRAVENOUS; SUBCUTANEOUS ONCE
Refills: 0 | Status: COMPLETED | OUTPATIENT
Start: 2023-04-28 | End: 2023-04-28

## 2023-04-28 RX ORDER — MORPHINE SULFATE 50 MG/1
4 CAPSULE, EXTENDED RELEASE ORAL ONCE
Refills: 0 | Status: DISCONTINUED | OUTPATIENT
Start: 2023-04-28 | End: 2023-04-28

## 2023-04-28 RX ORDER — ASPIRIN/CALCIUM CARB/MAGNESIUM 324 MG
81 TABLET ORAL DAILY
Refills: 0 | Status: DISCONTINUED | OUTPATIENT
Start: 2023-04-28 | End: 2023-04-29

## 2023-04-28 RX ORDER — THIAMINE MONONITRATE (VIT B1) 100 MG
100 TABLET ORAL DAILY
Refills: 0 | Status: DISCONTINUED | OUTPATIENT
Start: 2023-04-28 | End: 2023-04-29

## 2023-04-28 RX ORDER — FUROSEMIDE 40 MG
40 TABLET ORAL DAILY
Refills: 0 | Status: DISCONTINUED | OUTPATIENT
Start: 2023-04-28 | End: 2023-04-29

## 2023-04-28 RX ORDER — CARVEDILOL PHOSPHATE 80 MG/1
25 CAPSULE, EXTENDED RELEASE ORAL EVERY 12 HOURS
Refills: 0 | Status: DISCONTINUED | OUTPATIENT
Start: 2023-04-28 | End: 2023-04-29

## 2023-04-28 RX ORDER — ATORVASTATIN CALCIUM 80 MG/1
80 TABLET, FILM COATED ORAL AT BEDTIME
Refills: 0 | Status: DISCONTINUED | OUTPATIENT
Start: 2023-04-28 | End: 2023-04-29

## 2023-04-28 RX ADMIN — OXYCODONE HYDROCHLORIDE 10 MILLIGRAM(S): 5 TABLET ORAL at 21:28

## 2023-04-28 RX ADMIN — Medication 40 MILLIGRAM(S): at 18:53

## 2023-04-28 RX ADMIN — MORPHINE SULFATE 4 MILLIGRAM(S): 50 CAPSULE, EXTENDED RELEASE ORAL at 15:25

## 2023-04-28 RX ADMIN — SENNA PLUS 2 TABLET(S): 8.6 TABLET ORAL at 22:30

## 2023-04-28 RX ADMIN — Medication 2 MILLIGRAM(S): at 18:29

## 2023-04-28 RX ADMIN — Medication 1 MILLIGRAM(S): at 18:52

## 2023-04-28 RX ADMIN — OXYCODONE HYDROCHLORIDE 10 MILLIGRAM(S): 5 TABLET ORAL at 20:28

## 2023-04-28 RX ADMIN — GABAPENTIN 300 MILLIGRAM(S): 400 CAPSULE ORAL at 18:53

## 2023-04-28 RX ADMIN — Medication 100 MILLIGRAM(S): at 18:53

## 2023-04-28 RX ADMIN — HEPARIN SODIUM 5000 UNIT(S): 5000 INJECTION INTRAVENOUS; SUBCUTANEOUS at 20:28

## 2023-04-28 RX ADMIN — Medication 81 MILLIGRAM(S): at 18:53

## 2023-04-28 RX ADMIN — CARVEDILOL PHOSPHATE 25 MILLIGRAM(S): 80 CAPSULE, EXTENDED RELEASE ORAL at 18:53

## 2023-04-28 RX ADMIN — LOSARTAN POTASSIUM 100 MILLIGRAM(S): 100 TABLET, FILM COATED ORAL at 18:53

## 2023-04-28 RX ADMIN — MORPHINE SULFATE 4 MILLIGRAM(S): 50 CAPSULE, EXTENDED RELEASE ORAL at 17:49

## 2023-04-28 RX ADMIN — ATORVASTATIN CALCIUM 80 MILLIGRAM(S): 80 TABLET, FILM COATED ORAL at 22:31

## 2023-04-28 RX ADMIN — Medication 400 MILLIGRAM(S): at 11:49

## 2023-04-28 RX ADMIN — MORPHINE SULFATE 4 MILLIGRAM(S): 50 CAPSULE, EXTENDED RELEASE ORAL at 15:35

## 2023-04-28 RX ADMIN — Medication 1 TABLET(S): at 18:52

## 2023-04-28 NOTE — H&P ADULT - PROBLEM SELECTOR PLAN 3
-c/w home coreg, lasix, losartan  -suspect current hypertension is 2/2 missing medication doses and concurrent pain

## 2023-04-28 NOTE — ED ADULT NURSE REASSESSMENT NOTE - NURSING MUSC ROM
pt is uanble to move left leg.pain to left hip. pt able to move toes, pt has sensation to toes.. pt has full range of motion to rt lower extremity

## 2023-04-28 NOTE — ED ADULT NURSE NOTE - PRO INTERPRETER NEED 2
English Opzelura Counseling:  I discussed with the patient the risks of Opzelura including but not limited to nasopharngitis, bronchitis, ear infection, eosinophila, hives, diarrhea, folliculitis, tonsillitis, and rhinorrhea.  Taken orally, this medication has been linked to serious infections; higher rate of mortality; malignancy and lymphoproliferative disorders; major adverse cardiovascular events; thrombosis; thrombocytopenia, anemia, and neutropenia; and lipid elevations.

## 2023-04-28 NOTE — H&P ADULT - ASSESSMENT
73M w/ PMHx CAD s/p CABG (2004), CHF w/ biV ICD w/ recent lead replacement, HTN, DM, HLD, EtOH use admitted after a fall on 4/27 resulting in L intertrochanteric fracture.

## 2023-04-28 NOTE — CONSULT NOTE ADULT - SUBJECTIVE AND OBJECTIVE BOX
Patient is a 73yMale community ambulator without assistive devices who presents to [facility] ED w/ a c/o of L hip pain after a mechanical fall. Patient states he slipped down several stairs in his house and landed on his L hip. Denies HH/LOC. Denies any numbness or tingling. Denies having any other pain elsewhere. Has a notable history of CABG, pacemaker. Drinks a pint of vodka 5 days per week and smokes about 4-5 cigarettes daily. Denies any previous orthopedic history. No other orthopedic concerns at this time.    No Known Allergies      PHYSICAL EXAM:  T(C): 36.9 (04-28-23 @ 13:17), Max: 36.9 (04-28-23 @ 11:06)  HR: 77 (04-28-23 @ 13:17) (77 - 93)  BP: 164/75 (04-28-23 @ 13:17) (164/75 - 181/76)  RR: 17 (04-28-23 @ 13:17) (16 - 17)  SpO2: 99% (04-28-23 @ 13:17) (97% - 100%)    Gen: NAD, Resting comfortably    LLE:  Shortened and externally rotated extremity   Skin intact, no erythema or ecchymosis  TTP at L hip, no other TTP along LLE  +EHL/FHL/TA/GSC  +SILT L2-S1  + DP  Compartments soft and compressible  No calf tenderness    Secondary Assessment:  NC/AT, NTTP of clavicles, NTTP of C-,T-,L-Spine, NTTP of Pelvis  UEs: NTTP of Shoulders, Elbows, Wrists, Hands; NT with AROM/PROM of Shoulders, Elbows, Wrists, Hands; AIN/PIN/Med/Uln/Msc/Rad/Ax intact  RLE: Able to SLR, NT with Log Roll, NT with Heel Strike, NTTP of Hip, Knee, Ankle, foot; NT with AROM/PROM of Hip, Knee, Ankle, foot; Q/H/Gsc/TA/EHL/FHL intact    Imaging:  Xray L Hip/Femur: Displaced intertrochanteric fracture    A/P: 73M with L intertrochanteric fracture s/p mechanical fall    Plan for surgery tomorrow  NPO after midnight except medications  Hold chemical DVT ppx after midnight  Please document necessary medical optimizations for surgery  Analgesia as needed  NWB  Ice  as tolerated  Medical management appreciated  Will discuss with Dr. Castrejon and adjust plan as needed   Patient is a 73yMale community ambulator without assistive devices who presents to [place] ED w/ a c/o of L hip pain after a mechanical fall. Patient states he slipped down several stairs in his house and landed on his L hip. Denies HH/LOC. Denies any numbness or tingling. Denies having any other pain elsewhere. Has a notable history of CABG, pacemaker. Drinks a pint of vodka 5 days per week and smokes about 4-5 cigarettes daily. Denies any previous orthopedic history. No other orthopedic concerns at this time.    No Known Allergies      PHYSICAL EXAM:  T(C): 36.9 (04-28-23 @ 13:17), Max: 36.9 (04-28-23 @ 11:06)  HR: 77 (04-28-23 @ 13:17) (77 - 93)  BP: 164/75 (04-28-23 @ 13:17) (164/75 - 181/76)  RR: 17 (04-28-23 @ 13:17) (16 - 17)  SpO2: 99% (04-28-23 @ 13:17) (97% - 100%)    Gen: NAD, Resting comfortably    LLE:  Shortened and externally rotated extremity   Skin intact, no erythema or ecchymosis  TTP at L hip, no other TTP along LLE  +EHL/FHL/TA/GSC  +SILT L2-S1  + DP  Compartments soft and compressible  No calf tenderness    Secondary Assessment:  NC/AT, NTTP of clavicles, NTTP of C-,T-,L-Spine, NTTP of Pelvis  UEs: NTTP of Shoulders, Elbows, Wrists, Hands; NT with AROM/PROM of Shoulders, Elbows, Wrists, Hands; AIN/PIN/Med/Uln/Msc/Rad/Ax intact  RLE: Able to SLR, NT with Log Roll, NT with Heel Strike, NTTP of Hip, Knee, Ankle, foot; NT with AROM/PROM of Hip, Knee, Ankle, foot; Q/H/Gsc/TA/EHL/FHL intact    Imaging:  Xray L Hip/Femur: Displaced intertrochanteric fracture    A/P: 73M with L intertrochanteric fracture s/p mechanical fall    Recommend medical admission   Plan for left hip IMN tomorrow with Dr. Castrejon  NPO after midnight except medications  Hold chemical DVT ppx after midnight  PPM interrogated 1/23 (document in allscripts)   Medical optimization, please document clearance status   Analgesia as needed  NWB/bedrest   Discussed with son at bedside, all questions answered.   Will discuss with Dr. Castrejon and adjust plan as needed

## 2023-04-28 NOTE — H&P ADULT - PROBLEM SELECTOR PLAN 7
-s/p CICU stay in 2014 where he was found to be a candidate for biV ICD w/ recent lead replacement in January 2023 w/ Dr. Ivey  -continue current home meds: coreg, losartan, lasix

## 2023-04-28 NOTE — H&P ADULT - HISTORY OF PRESENT ILLNESS
73M w/ PMHx CAD s/p CABG, PPM w/ recent lead replacement, HTN, DM, HLD, EtOH use admitted after a fall on 4/27 resulting in L intertrochanteric fracture. History obtained from pt and his son bedside. Patient drinks approx 1pint EtOH daily (except Monday/Tuesday), last drink was 4/27 in the late evening. Patient had a mechanical fall down several stairs in his house and landed on his L hip after which he was not able to bear weight on that side. Per patient's son, he has had episodes of EtOH withdrawal in the past where he has required restraints, though unclear if patient required MICU stay or intubation due to withdrawal. In the field, patient was given fentanyl, then upon arriving to the ED received IV tylenol and then morphine 4mg. Imaging showed acute mildly varus impacted oblique transverse intertrochanteric L prox femoral fracture. He was seen by orthopedics and is planned for OR tomorrow.  73M w/ PMHx CAD s/p CABG (2004), CHF w/ biV ICD w/ recent lead replacement, HTN, DM, HLD, EtOH use admitted after a fall on 4/27 resulting in L intertrochanteric fracture. History obtained from pt and his son bedside. Patient drinks approx 1pint EtOH daily (except Monday/Tuesday), last drink was 4/27 in the late evening. Patient had a mechanical fall down several stairs in his house and landed on his L hip after which he was not able to bear weight on that side. Per patient's son, he has had episodes of EtOH withdrawal in the past where he has required restraints, though unclear if patient required MICU stay or intubation due to withdrawal. In the field, patient was given fentanyl, then upon arriving to the ED received IV tylenol and then morphine 4mg. Imaging showed acute mildly varus impacted oblique transverse intertrochanteric L prox femoral fracture. He was seen by orthopedics and is planned for OR tomorrow.

## 2023-04-28 NOTE — H&P ADULT - PROBLEM SELECTOR PLAN 2
-Last drink 4/27 between 7 and 9pm, per son pt has hx of withdrawal with severe agitation in the past, unclear if needed MICU for this  -On exam was starting to develop mild tremors however no other signs of active withdrawal  -will start on ativan taper for now to prevent patient from severe withdrawal w/ PRN symptom triggered ativan. Per discussion with anesthesia, ativan taper is not contraindication for OR; as long as pt remains hemodynamically stable from a withdrawal perspective, no objection to proceed   -multivitamin, folate, thiamine  -SBIRT consult

## 2023-04-28 NOTE — H&P ADULT - NSHPPHYSICALEXAM_GEN_ALL_CORE
Vital Signs Last 24 Hrs  T(C): 37.1 (28 Apr 2023 15:20), Max: 37.1 (28 Apr 2023 15:20)  T(F): 98.8 (28 Apr 2023 15:20), Max: 98.8 (28 Apr 2023 15:20)  HR: 91 (28 Apr 2023 16:30) (77 - 93)  BP: 163/68 (28 Apr 2023 16:30) (158/75 - 181/76)  BP(mean): --  RR: 20 (28 Apr 2023 16:30) (16 - 20)  SpO2: 100% (28 Apr 2023 16:30) (96% - 100%)    Parameters below as of 28 Apr 2023 13:17  Patient On (Oxygen Delivery Method): room air        CONSTITUTIONAL: Well-groomed, in no apparent distress  EYES: No conjunctival or scleral injection, non-icteric; PERRLA and symmetric  ENMT: No external nasal lesions; nasal mucosa not inflamed; normal dentition; no pharyngeal injection or exudates, oral mucosa with moist membranes  NECK: Trachea midline without palpable neck mass; thyroid not enlarged and non-tender  RESPIRATORY: Breathing comfortably; no dullness to percussion; lungs CTA without wheeze/rhonchi/rales  CARDIOVASCULAR: +S1S2, RRR, no M/G/R; no carotid bruits; pedal pulses full and symmetric; no lower extremity edema  GASTROINTESTINAL: No palpable masses or tenderness, +BS throughout, no rebound/guarding; no hepatosplenomegaly; no hernia palpated  MUSCULOSKELETAL: tenderness to palpation on L hip, leg is shortened and externally rotated. Able to move toes in b/l LE  SKIN: No rashes or ulcers noted; no subcutaneous nodules or induration palpable  NEUROLOGIC: CN II-XII intact; sensation intact in LEs b/l to light touch  PSYCHIATRIC: A+O x 3; mood and affect appropriate; appropriate insight and judgment

## 2023-04-28 NOTE — ED ADULT NURSE NOTE - OBJECTIVE STATEMENT
Pt is a 72 yo male BIBA sp fall down 3 steps last night. Pt states that he was walking down the stairs when he heard his phone ring so he went to turn around to answer the phone when he slipped and fell down 3-5 steps. Pt denies hitting his head and no LOC. Pt was able to get up after the fall and walk upstairs and fell asleep. Pt woke up today and had worsening pain when attempting to ambulate to the bathroom. Pt denies any CP or SOB no N/V/D no cough fever or chills. Pts son states that pt drinks 1 Liter of vodka daily. Pt lives with his wife and his wife is away this weekend. Pt denies any other complaints at this time

## 2023-04-28 NOTE — H&P ADULT - PROBLEM SELECTOR PLAN 8
Diet: DASH, NPO after MN  DVT: one dose heparin today, holding chem ppx after MN  Dispo: pending OR and PT eval    Plan discussed with patient's son bedside

## 2023-04-28 NOTE — ED ADULT NURSE REASSESSMENT NOTE - NURSING MUSC ROM
pt unable to move left leg. pain to left hip. full range motion to right leg pt unable to move left leg. pain to left hip. full range motion to right leg/- - -

## 2023-04-28 NOTE — H&P ADULT - PROBLEM SELECTOR PLAN 4
-check A1c in Am  -pt takes repaglinide only, sugars are currently in range. If sugars start rising, can put pt on ISS w/ FS q6h while NPO

## 2023-04-28 NOTE — ED PROVIDER NOTE - ATTENDING APP SHARED VISIT CONTRIBUTION OF CARE
74yo M with PMH of CAD s/p CABG, BIBEMS with son at bedside c/o L hip pain since fall last night. Patient states that he fell down several steps last night was by himself as his wife was aware and then was able to crawl back into bed noted to have significant left hip pain received 50 mics of fentanyl in the field the left hip is externally rotated and shortened likely hip fracture no other signs of injury were noted normocephalic atraumatic not on any blood thinners.  Patient is a daily drinker but is not currently intoxicated as per son he can go through withdrawals per CIWA protocols been started.

## 2023-04-28 NOTE — ED ADULT TRIAGE NOTE - CHIEF COMPLAINT QUOTE
patient fell down 3 flights of stairs yesterday. climbed back up two stairs and has a L hip deformity. unknown LOC. no other injuries. awake and alert

## 2023-04-28 NOTE — ED ADULT NURSE NOTE - NSIMPLEMENTINTERV_GEN_ALL_ED
Implemented All Fall Risk Interventions:  [place] to call system. Call bell, personal items and telephone within reach. Instruct patient to call for assistance. Room bathroom lighting operational. Non-slip footwear when patient is off stretcher. Physically safe environment: no spills, clutter or unnecessary equipment. Stretcher in lowest position, wheels locked, appropriate side rails in place. Provide visual cue, wrist band, yellow gown, etc. Monitor gait and stability. Monitor for mental status changes and reorient to person, place, and time. Review medications for side effects contributing to fall risk. Reinforce activity limits and safety measures with patient and family.

## 2023-04-28 NOTE — ED PROVIDER NOTE - OBJECTIVE STATEMENT
72yo M with PMH of CAD s/p CABG, BIBEMS with son at bedside c/o L hip pain since fall last night. Pt reports he slipped on stairs and fell onto L side. Denies head injury or LOC. Unable to weight bear on L leg since fall. Pt give fentanyl by EMS. Per son, pt drinks a pint+ of vodka every day. Denies HA, dizziness, vision changes, neck pain, back pain, CP, SOB, numbness/tingling, any other injury other than LLE. 74yo M with PMH of CAD s/p CABG, s/p PPM, BIBEMS with son at bedside c/o L hip pain since fall last night. Pt reports he slipped on stairs at home and fell onto L side. Denies head injury or LOC. Unable to weight bear on L leg since fall. Pt given fentanyl by EMS. Per son, pt drinks a pint+ of vodka every day. Denies HA, dizziness, vision changes, neck pain, back pain, CP, SOB, numbness/tingling, any other injury other than LLE.

## 2023-04-28 NOTE — H&P ADULT - NSHPREVIEWOFSYSTEMS_GEN_ALL_CORE
CONSTITUTIONAL: No fever, weight loss  EYES: No eye pain, visual disturbances, or discharge  ENMT:  No difficulty hearing, tinnitus, vertigo; No sinus or throat pain  RESPIRATORY: No SOB. No cough, wheezing, chills or hemoptysis  CARDIOVASCULAR: No chest pain, palpitations, dizziness, or leg swelling  GASTROINTESTINAL: No abdominal or epigastric pain. No nausea, vomiting, or hematemesis; No diarrhea or constipation. No melena or hematochezia.  GENITOURINARY: No dysuria, frequency, hematuria, or incontinence  NEUROLOGICAL: No headaches, memory loss, loss of strength, numbness, or tremors  SKIN: No itching, burning, rashes, or lesions   LYMPH NODES: No enlarged glands  ENDOCRINE: No heat or cold intolerance; No hair loss  MUSCULOSKELETAL: +hip pain; No muscle, back pain  PSYCHIATRIC: No depression, anxiety, mood swings, or difficulty sleeping  HEME/LYMPH: No easy bruising, or bleeding gums

## 2023-04-28 NOTE — H&P ADULT - PROBLEM SELECTOR PLAN 1
-s/p mechanical fall on 4/27 w/ displaced intertrochanteric fracture, seen by ortho, planned for L hip IMN tomorrow 4/29. NPO after MN, AM labs ordered, holding DVT ppx after MN  -analgesia with oxy 5mg for moderate pain and 10mg for severe pain for now -s/p mechanical fall on 4/27 w/ displaced intertrochanteric fracture, seen by ortho, planned for L hip IMN tomorrow 4/29. NPO after MN, AM labs ordered, holding DVT ppx after MN  -analgesia with oxy 5mg for moderate pain and 10mg for severe pain for now. Concurrent bowel regimen of senna and miralax ordered  -will need PT after OR  -given high mortality risk of unrepaired intertrochanteric fractures, and that patient is currently hemodynamically stable and -s/p mechanical fall on 4/27 w/ displaced intertrochanteric fracture, seen by ortho, planned for L hip IMN tomorrow 4/29. NPO after MN, AM labs ordered, holding DVT ppx after MN  -analgesia with oxy 5mg for moderate pain and 10mg for severe pain for now. Concurrent bowel regimen of senna and miralax ordered  -will need PT after OR  -given high mortality risk of intertrochanteric fracture and that patient is hemodynamically stable, he is currently medically optimized for the OR.

## 2023-04-28 NOTE — ED PROVIDER NOTE - LOWER EXTREMITY EXAM, LEFT
LLE shortened and externally rotated, + ttp to L hip/femur/knee; able to range ankle/toes, neurovasc intact

## 2023-04-28 NOTE — ED PROVIDER NOTE - NS ED ATTENDING STATEMENT MOD
This was a shared visit with the DARIEN. I reviewed and verified the documentation and independently performed the documented:

## 2023-04-28 NOTE — H&P ADULT - NSICDXPASTMEDICALHX_GEN_ALL_CORE_FT
PAST MEDICAL HISTORY:  CAD (coronary artery disease)     Chronic systolic congestive heart failure     Diabetes mellitus     HLD (hyperlipidemia)     Hypertension     S/P CABG x 1

## 2023-04-28 NOTE — ED PROVIDER NOTE - GASTROINTESTINAL, MLM
This patient is accompanied in the office by his mother. Chief Complaint   Patient presents with    Vomiting        Visit Vitals  BP 98/62 (BP 1 Location: Right arm, BP Patient Position: Sitting)   Pulse 103   Temp 98.7 °F (37.1 °C) (Oral)   Resp 19   Ht (!) 3' 9.08\" (1.145 m)   Wt 68 lb 9.6 oz (31.1 kg)   SpO2 100%   BMI 23.73 kg/m²          1. Have you been to the ER, urgent care clinic since your last visit? Hospitalized since your last visit? No    2. Have you seen or consulted any other health care providers outside of the [address] [place] [address] since your last visit? Include any pap smears or colon screening.  No Abdomen soft, non-tender, no guarding.

## 2023-04-28 NOTE — ED ADULT NURSE REASSESSMENT NOTE - NS ED NURSE REASSESS COMMENT FT1
pt A&0x3 with complaint of left hip pain 10/10,
pt a&0x3 with partial relief  to left hip, no other complaints at this time

## 2023-04-28 NOTE — PATIENT PROFILE ADULT - FALL HARM RISK - HARM RISK INTERVENTIONS
Assistance with ambulation/Assistance OOB with selected safe patient handling equipment/Communicate Risk of Fall with Harm to all staff/Monitor for mental status changes/Monitor gait and stability/Reinforce activity limits and safety measures with patient and family/Tailored Fall Risk Interventions/Toileting schedule using arm’s reach rule for commode and bathroom/Use of alarms - bed, chair and/or voice tab/Visual Cue: Yellow wristband and red socks/Bed in lowest position, wheels locked, appropriate side rails in place/Call bell, personal items and telephone in reach/Instruct patient to call for assistance before getting out of bed or chair/Non-slip footwear when patient is out of bed/[place] to call system/Physically safe environment - no spills, clutter or unnecessary equipment/Purposeful Proactive Rounding/Room/bathroom lighting operational, light cord in reach

## 2023-04-29 LAB
A1C WITH ESTIMATED AVERAGE GLUCOSE RESULT: 5.9 % — HIGH (ref 4–5.6)
ANION GAP SERPL CALC-SCNC: 11 MMOL/L — SIGNIFICANT CHANGE UP (ref 5–17)
ANION GAP SERPL CALC-SCNC: 13 MMOL/L — SIGNIFICANT CHANGE UP (ref 5–17)
APTT BLD: 28 SEC — SIGNIFICANT CHANGE UP (ref 27.5–35.5)
APTT BLD: 28.2 SEC — SIGNIFICANT CHANGE UP (ref 27.5–35.5)
BUN SERPL-MCNC: 16 MG/DL — SIGNIFICANT CHANGE UP (ref 7–23)
BUN SERPL-MCNC: 19 MG/DL — SIGNIFICANT CHANGE UP (ref 7–23)
CALCIUM SERPL-MCNC: 9.1 MG/DL — SIGNIFICANT CHANGE UP (ref 8.4–10.5)
CALCIUM SERPL-MCNC: 9.2 MG/DL — SIGNIFICANT CHANGE UP (ref 8.4–10.5)
CHLORIDE SERPL-SCNC: 100 MMOL/L — SIGNIFICANT CHANGE UP (ref 96–108)
CHLORIDE SERPL-SCNC: 99 MMOL/L — SIGNIFICANT CHANGE UP (ref 96–108)
CHOLEST SERPL-MCNC: 139 MG/DL — SIGNIFICANT CHANGE UP
CO2 SERPL-SCNC: 24 MMOL/L — SIGNIFICANT CHANGE UP (ref 22–31)
CO2 SERPL-SCNC: 27 MMOL/L — SIGNIFICANT CHANGE UP (ref 22–31)
CREAT SERPL-MCNC: 1.02 MG/DL — SIGNIFICANT CHANGE UP (ref 0.5–1.3)
CREAT SERPL-MCNC: 1.16 MG/DL — SIGNIFICANT CHANGE UP (ref 0.5–1.3)
EGFR: 66 ML/MIN/1.73M2 — SIGNIFICANT CHANGE UP
EGFR: 77 ML/MIN/1.73M2 — SIGNIFICANT CHANGE UP
ESTIMATED AVERAGE GLUCOSE: 123 MG/DL — HIGH (ref 68–114)
GLUCOSE SERPL-MCNC: 101 MG/DL — HIGH (ref 70–99)
GLUCOSE SERPL-MCNC: 85 MG/DL — SIGNIFICANT CHANGE UP (ref 70–99)
HCT VFR BLD CALC: 32.6 % — LOW (ref 39–50)
HCT VFR BLD CALC: 35.7 % — LOW (ref 39–50)
HCV AB S/CO SERPL IA: 0.13 S/CO — SIGNIFICANT CHANGE UP (ref 0–0.99)
HCV AB SERPL-IMP: SIGNIFICANT CHANGE UP
HDLC SERPL-MCNC: 79 MG/DL — SIGNIFICANT CHANGE UP
HGB BLD-MCNC: 10.6 G/DL — LOW (ref 13–17)
HGB BLD-MCNC: 11.2 G/DL — LOW (ref 13–17)
INR BLD: 0.98 RATIO — SIGNIFICANT CHANGE UP (ref 0.88–1.16)
LIPID PNL WITH DIRECT LDL SERPL: 41 MG/DL — SIGNIFICANT CHANGE UP
MCHC RBC-ENTMCNC: 28.6 PG — SIGNIFICANT CHANGE UP (ref 27–34)
MCHC RBC-ENTMCNC: 29.2 PG — SIGNIFICANT CHANGE UP (ref 27–34)
MCHC RBC-ENTMCNC: 31.4 GM/DL — LOW (ref 32–36)
MCHC RBC-ENTMCNC: 32.5 GM/DL — SIGNIFICANT CHANGE UP (ref 32–36)
MCV RBC AUTO: 89.8 FL — SIGNIFICANT CHANGE UP (ref 80–100)
MCV RBC AUTO: 91.3 FL — SIGNIFICANT CHANGE UP (ref 80–100)
NON HDL CHOLESTEROL: 60 MG/DL — SIGNIFICANT CHANGE UP
NRBC # BLD: 0 /100 WBCS — SIGNIFICANT CHANGE UP (ref 0–0)
NRBC # BLD: 0 /100 WBCS — SIGNIFICANT CHANGE UP (ref 0–0)
PLATELET # BLD AUTO: 121 K/UL — LOW (ref 150–400)
PLATELET # BLD AUTO: 123 K/UL — LOW (ref 150–400)
POTASSIUM SERPL-MCNC: 4.1 MMOL/L — SIGNIFICANT CHANGE UP (ref 3.5–5.3)
POTASSIUM SERPL-MCNC: 4.2 MMOL/L — SIGNIFICANT CHANGE UP (ref 3.5–5.3)
POTASSIUM SERPL-SCNC: 4.1 MMOL/L — SIGNIFICANT CHANGE UP (ref 3.5–5.3)
POTASSIUM SERPL-SCNC: 4.2 MMOL/L — SIGNIFICANT CHANGE UP (ref 3.5–5.3)
PROTHROM AB SERPL-ACNC: 11.4 SEC — SIGNIFICANT CHANGE UP (ref 10.5–13.4)
RBC # BLD: 3.63 M/UL — LOW (ref 4.2–5.8)
RBC # BLD: 3.91 M/UL — LOW (ref 4.2–5.8)
RBC # FLD: 14 % — SIGNIFICANT CHANGE UP (ref 10.3–14.5)
RBC # FLD: 14.2 % — SIGNIFICANT CHANGE UP (ref 10.3–14.5)
SODIUM SERPL-SCNC: 137 MMOL/L — SIGNIFICANT CHANGE UP (ref 135–145)
SODIUM SERPL-SCNC: 137 MMOL/L — SIGNIFICANT CHANGE UP (ref 135–145)
TRIGL SERPL-MCNC: 98 MG/DL — SIGNIFICANT CHANGE UP
WBC # BLD: 7.14 K/UL — SIGNIFICANT CHANGE UP (ref 3.8–10.5)
WBC # BLD: 9.64 K/UL — SIGNIFICANT CHANGE UP (ref 3.8–10.5)
WBC # FLD AUTO: 7.14 K/UL — SIGNIFICANT CHANGE UP (ref 3.8–10.5)
WBC # FLD AUTO: 9.64 K/UL — SIGNIFICANT CHANGE UP (ref 3.8–10.5)

## 2023-04-29 PROCEDURE — 99233 SBSQ HOSP IP/OBS HIGH 50: CPT

## 2023-04-29 PROCEDURE — 27245 TREAT THIGH FRACTURE: CPT | Mod: LT

## 2023-04-29 DEVICE — SCREW LCK 5X36MM TI: Type: IMPLANTABLE DEVICE | Site: LEFT | Status: FUNCTIONAL

## 2023-04-29 DEVICE — BLADE HELICAL 11MMX95MM: Type: IMPLANTABLE DEVICE | Site: LEFT | Status: FUNCTIONAL

## 2023-04-29 DEVICE — IMPLANTABLE DEVICE: Type: IMPLANTABLE DEVICE | Site: LEFT | Status: FUNCTIONAL

## 2023-04-29 RX ORDER — ONDANSETRON 8 MG/1
4 TABLET, FILM COATED ORAL ONCE
Refills: 0 | Status: DISCONTINUED | OUTPATIENT
Start: 2023-04-29 | End: 2023-04-29

## 2023-04-29 RX ORDER — SODIUM CHLORIDE 9 MG/ML
1000 INJECTION INTRAMUSCULAR; INTRAVENOUS; SUBCUTANEOUS
Refills: 0 | Status: DISCONTINUED | OUTPATIENT
Start: 2023-04-29 | End: 2023-04-30

## 2023-04-29 RX ORDER — OXYCODONE HYDROCHLORIDE 5 MG/1
5 TABLET ORAL EVERY 4 HOURS
Refills: 0 | Status: DISCONTINUED | OUTPATIENT
Start: 2023-04-29 | End: 2023-05-05

## 2023-04-29 RX ORDER — OXYCODONE HYDROCHLORIDE 5 MG/1
10 TABLET ORAL EVERY 4 HOURS
Refills: 0 | Status: DISCONTINUED | OUTPATIENT
Start: 2023-04-29 | End: 2023-05-03

## 2023-04-29 RX ORDER — TRAMADOL HYDROCHLORIDE 50 MG/1
25 TABLET ORAL EVERY 6 HOURS
Refills: 0 | Status: DISCONTINUED | OUTPATIENT
Start: 2023-04-29 | End: 2023-05-03

## 2023-04-29 RX ORDER — MAGNESIUM HYDROXIDE 400 MG/1
30 TABLET, CHEWABLE ORAL DAILY
Refills: 0 | Status: DISCONTINUED | OUTPATIENT
Start: 2023-04-29 | End: 2023-05-05

## 2023-04-29 RX ORDER — ACETAMINOPHEN 500 MG
500 TABLET ORAL EVERY 6 HOURS
Refills: 0 | Status: DISCONTINUED | OUTPATIENT
Start: 2023-04-29 | End: 2023-05-05

## 2023-04-29 RX ORDER — ENOXAPARIN SODIUM 100 MG/ML
30 INJECTION SUBCUTANEOUS EVERY 24 HOURS
Refills: 0 | Status: DISCONTINUED | OUTPATIENT
Start: 2023-04-30 | End: 2023-05-01

## 2023-04-29 RX ORDER — SENNA PLUS 8.6 MG/1
2 TABLET ORAL AT BEDTIME
Refills: 0 | Status: DISCONTINUED | OUTPATIENT
Start: 2023-04-29 | End: 2023-05-05

## 2023-04-29 RX ORDER — POLYETHYLENE GLYCOL 3350 17 G/17G
17 POWDER, FOR SOLUTION ORAL DAILY
Refills: 0 | Status: DISCONTINUED | OUTPATIENT
Start: 2023-04-29 | End: 2023-05-05

## 2023-04-29 RX ORDER — CEFAZOLIN SODIUM 1 G
2000 VIAL (EA) INJECTION EVERY 8 HOURS
Refills: 0 | Status: COMPLETED | OUTPATIENT
Start: 2023-04-29 | End: 2023-04-30

## 2023-04-29 RX ORDER — HYDROMORPHONE HYDROCHLORIDE 2 MG/ML
0.5 INJECTION INTRAMUSCULAR; INTRAVENOUS; SUBCUTANEOUS
Refills: 0 | Status: DISCONTINUED | OUTPATIENT
Start: 2023-04-29 | End: 2023-04-29

## 2023-04-29 RX ADMIN — CARVEDILOL PHOSPHATE 25 MILLIGRAM(S): 80 CAPSULE, EXTENDED RELEASE ORAL at 06:01

## 2023-04-29 RX ADMIN — Medication 2 MILLIGRAM(S): at 02:03

## 2023-04-29 RX ADMIN — Medication 2 MILLIGRAM(S): at 06:02

## 2023-04-29 RX ADMIN — SODIUM CHLORIDE 100 MILLILITER(S): 9 INJECTION INTRAMUSCULAR; INTRAVENOUS; SUBCUTANEOUS at 18:43

## 2023-04-29 RX ADMIN — GABAPENTIN 300 MILLIGRAM(S): 400 CAPSULE ORAL at 06:01

## 2023-04-29 RX ADMIN — Medication 40 MILLIGRAM(S): at 06:02

## 2023-04-29 RX ADMIN — OXYCODONE HYDROCHLORIDE 5 MILLIGRAM(S): 5 TABLET ORAL at 07:02

## 2023-04-29 RX ADMIN — OXYCODONE HYDROCHLORIDE 5 MILLIGRAM(S): 5 TABLET ORAL at 06:02

## 2023-04-29 RX ADMIN — Medication 100 MILLIGRAM(S): at 23:02

## 2023-04-29 RX ADMIN — SENNA PLUS 2 TABLET(S): 8.6 TABLET ORAL at 23:07

## 2023-04-29 RX ADMIN — Medication 1.5 MILLIGRAM(S): at 22:59

## 2023-04-29 RX ADMIN — LOSARTAN POTASSIUM 100 MILLIGRAM(S): 100 TABLET, FILM COATED ORAL at 06:03

## 2023-04-29 NOTE — DISCHARGE NOTE PROVIDER - CARE PROVIDER_API CALL
Quintin Castrejon)  Orthopaedic Surgery  [address], [address]  [place] [address]  Phone: [phone]  Fax: [phone]  Follow Up Time: 1 week    Erik Clement)  Internal Medicine  [address] [address]  Phone: [phone]  Fax: [phone]  Follow Up Time: 1 week

## 2023-04-29 NOTE — PROGRESS NOTE ADULT - SUBJECTIVE AND OBJECTIVE BOX
Patient is a 73y old  Male who presents with a chief complaint of L intertrochanteric fracture (2023 04:00)      SUBJECTIVE / OVERNIGHT EVENTS: Patient seen and examined at bedside. No acute events overnight. Discussed with RN at bedside. Given Ativan & Oxycodone recently. No agitation. Pain to palpation of left hip. Calm right now    MEDICATIONS  (STANDING):  aspirin  chewable 81 milliGRAM(s) Oral daily  atorvastatin 80 milliGRAM(s) Oral at bedtime  carvedilol 25 milliGRAM(s) Oral every 12 hours  folic acid 1 milliGRAM(s) Oral daily  furosemide    Tablet 40 milliGRAM(s) Oral daily  gabapentin 300 milliGRAM(s) Oral two times a day  LORazepam     Tablet   Oral   LORazepam     Tablet 1.5 milliGRAM(s) Oral every 4 hours  LORazepam     Tablet 2 milliGRAM(s) Oral every 4 hours  losartan 100 milliGRAM(s) Oral daily  multivitamin 1 Tablet(s) Oral daily  polyethylene glycol 3350 17 Gram(s) Oral daily  senna 2 Tablet(s) Oral at bedtime  thiamine 100 milliGRAM(s) Oral daily    MEDICATIONS  (PRN):  LORazepam   Injectable 2 milliGRAM(s) IV Push every 2 hours PRN Symptom-triggered: 2 point increase in CIWA -Ar score and a total score of 7 or LESS  oxyCODONE    IR 10 milliGRAM(s) Oral every 6 hours PRN Severe Pain (7 - 10)  oxyCODONE    IR 5 milliGRAM(s) Oral every 6 hours PRN Moderate Pain (4 - 6)      CAPILLARY BLOOD GLUCOSE        I&O's Summary    2023 07:01  -  2023 06:25  --------------------------------------------------------  IN: 0 mL / OUT: 300 mL / NET: -300 mL        PHYSICAL EXAM:  Vital Signs Last 24 Hrs  T(C): 36.8 (2023 05:45), Max: 37.3 (2023 00:19)  T(F): 98.2 (2023 05:45), Max: 99.2 (2023 00:19)  HR: 92 (2023 05:45) (72 - 93)  BP: 159/80 (2023 05:45) (118/79 - 181/76)  BP(mean): --  RR: 18 (2023 05:45) (16 - 20)  SpO2: 96% (2023 05:45) (93% - 100%)    Parameters below as of 2023 05:45  Patient On (Oxygen Delivery Method): room air        GEN: male in NAD, appears comfortable, no diaphoresis  EYES: No scleral injection, PERRL, EOMI  ENTM: neck supple & symmetric without tracheal deviation, moist membranes, no gross hearing impairment, thyroid gland not enlarged  CV: +S1/S2, no m/r/g, no abdominal bruit, no LE edema  RESP: breathing comfortably, no respiratory accessory muscle use, CTAB, no w/r/r  GI: normoactive BS, soft, NTND, no rebounding/guarding, no palpable masses    LABS:                        11.6   9.18  )-----------( 162      ( 2023 12:10 )             36.3         138  |  103  |  20  ----------------------------<  85  4.1   |  23  |  1.02    Ca    9.1      2023 12:10    TPro  8.1  /  Alb  4.3  /  TBili  0.4  /  DBili  x   /  AST  48<H>  /  ALT  52<H>  /  AlkPhos  107  -    PT/INR - ( 2023 12:10 )   PT: 10.4 sec;   INR: 0.91 ratio         PTT - ( 2023 12:10 )  PTT:25.7 sec      Urinalysis Basic - ( 2023 12:26 )    Color: Yellow / Appearance: Clear / S.018 / pH: x  Gluc: x / Ketone: Negative  / Bili: Negative / Urobili: Negative   Blood: x / Protein: 30 mg/dL / Nitrite: Negative   Leuk Esterase: Negative / RBC: 1 /hpf / WBC 1 /HPF   Sq Epi: x / Non Sq Epi: x / Bacteria: Negative          RADIOLOGY & ADDITIONAL TESTS:  Results Reviewed:   Imaging Personally Reviewed:  Electrocardiogram Personally Reviewed:    COORDINATION OF CARE:  Care Discussed with Consultants/Other Providers [Y/N]:  Prior or Outpatient Records Reviewed [Y/N]:

## 2023-04-29 NOTE — DISCHARGE NOTE PROVIDER - PROVIDER TOKENS
PROVIDER:[TOKEN:[[id number]],FOLLOWUP:[1 week]],PROVIDER:[TOKEN:[[id number]],FOLLOWUP:[1 week]]

## 2023-04-29 NOTE — DISCHARGE NOTE PROVIDER - HOSPITAL COURSE
73M w/ PMHx CAD s/p CABG, PPM w/ recent lead replacement, HTN, DM, HLD, EtOH use admitted after a fall on 4/27 resulting in L intertrochanteric fracture. History obtained from pt and his son bedside. Patient drinks approx 1pint EtOH daily (except Monday/Tuesday), last drink was 4/27 in the late evening. Patient had a mechanical fall down several stairs in his house and landed on his L hip after which he was not able to bear weight on that side.  Pt seen by orthopedics and is planned for OR tomorrow.    73M w/ PMHx CAD s/p CABG, PPM w/ recent lead replacement, HTN, DM, HLD, EtOH use admitted after a fall on 4/27 resulting in L intertrochanteric fracture. History obtained from pt and his son bedside. Patient drinks approx 1pint EtOH daily (except Monday/Tuesday), last drink was 4/27 in the late evening. Patient had a mechanical fall down several stairs in his house and landed on his L hip after which he was not able to bear weight on that side.  Pt was admitted for further medical management of:     Intertrochanteric fracture of left hip.   -s/p mechanical fall on 4/27 w/ displaced intertrochanteric fracture  -analgesia with oxy 5mg for severe pain, tylenol- pt received dose 5/3 without agitation/delirium   -s/p left femur IMN on 4/29  -PT consult- rec [facility]  - isaac removed.    Alcohol dependence with withdrawal  - Last drink 4/27 between 7 and 9pm, per son pt has hx of withdrawal with severe agitation in the past, unclear if needed MICU for this. Son also states pt is not a daily drinker, just social/weekend drinker. Withdrawal delirium now resolved.   - complete ativan taper, but cont symptoms triggered ativan for now  -SBIRT consult.    NASIM (acute kidney injury)   -NASIM noted on 4/30 and likely hemodynamically mediated from relative hypotension  -Monitor Cr  -Holding Losartan, Lasix and Coreg.    Hypertension  -Holding Losartan, Lasix and Coreg  -Reintroduce Coreg first if BP improves.    T2 Diabetes.   -pt takes repaglinide only, sugars are currently in range.    Hyperlipidemia.   ·  Plan: -c/w lipitor.    CAD (coronary artery disease).   -s/p CABG  -c/w ASA, statin.    Chronic systolic congestive heart failure.   -s/p CICU stay in 2014 where he was found to be a candidate for biV ICD w/ recent lead replacement in January 2023 w/ Dr. Ivey  -continue current home meds: coreg, losartan, lasix  -appears euvolemic.     Discharge/Dispo/Med rec discussed with attending  ____. Patient medically cleared for discharge to [facility] with outpatient follow up. 73M w/ PMHx CAD s/p CABG, PPM w/ recent lead replacement, HTN, DM, HLD, EtOH use admitted after a fall on 4/27 resulting in L intertrochanteric fracture. History obtained from pt and his son bedside. Patient drinks approx 1pint EtOH daily (except Monday/Tuesday), last drink was 4/27 in the late evening. Patient had a mechanical fall down several stairs in his house and landed on his L hip after which he was not able to bear weight on that side. Patient was admitted to medicine for further evaluation and treatment.     Intertrochanteric fracture of left hip  -s/p mechanical fall on 4/27 w/ displaced intertrochanteric fracture  -analgesia with oxy 5mg for severe pain, tylenol- pt received dose 5/3 without agitation/delirium   -s/p left femur IMN on 4/29  -PT consult- rec [facility]  - isaac removed    Alcohol dependence with withdrawal  - Last drink 4/27 between 7 and 9pm, per son pt has hx of withdrawal with severe agitation in the past, unclear if needed MICU for this. Son also states pt is not a daily drinker, just social/weekend drinker. Withdrawal delirium now resolved   - complete ativan taper, but cont symptoms triggered ativan for now  -SBIRT consult    NASIM (acute kidney injury)   -NASIM noted on 4/30 and likely hemodynamically mediated from relative hypotension  -Monitor Cr  -Holding Losartan, Lasix and Coreg    Hypertension  -Holding Losartan, Lasix and Coreg  -Reintroduce Coreg first if BP improves    T2 Diabetes.   -pt takes repaglinide only, sugars are currently in range.    Hyperlipidemia.   ·  Plan: -c/w lipitor.    CAD (coronary artery disease).   -s/p CABG  -c/w ASA, statin.    Chronic systolic congestive heart failure.   -s/p CICU stay in 2014 where he was found to be a candidate for biV ICD w/ recent lead replacement in January 2023 w/ Dr. Ivey  -continue current home meds: coreg, losartan, lasix  -appears euvolemic     Discharge/Dispo/Med rec discussed with attending Dr. Urban who determined patient medically cleared for discharge to [facility] with outpatient follow up. 73M w/ PMHx CAD s/p CABG, PPM w/ recent lead replacement, HTN, DM, HLD, EtOH use admitted after a fall on 4/27 resulting in L intertrochanteric fracture. History obtained from pt and his son bedside. Patient drinks approx 1pint EtOH daily (except Monday/Tuesday), last drink was 4/27 in the late evening. Patient had a mechanical fall down several stairs in his house and landed on his L hip after which he was not able to bear weight on that side. Patient was admitted to medicine for further evaluation and treatment.     Intertrochanteric fracture of left hip  -s/p mechanical fall on 4/27 w/ displaced intertrochanteric fracture  -analgesia with oxy 5mg for severe pain, tylenol - patient with c/o "disorientation" with oxycodone on 5/5  - Oxycodone d/c'd, trial tramadol 25 mg PO q6 hrs PRN for severe pain. If pain improvement without change in mental status can d/c to [facility]  -s/p left femur IMN on 4/29  -PT consult- rec [facility]  - isaac removed    Alcohol dependence with withdrawal  - Last drink 4/27 between 7 and 9pm, per son pt has hx of withdrawal with severe agitation in the past, unclear if needed MICU for this. Son also states pt is not a daily drinker, just social/weekend drinker. Withdrawal delirium now resolved   - complete ativan taper, but cont symptoms triggered ativan for now  - SBIRT consult    NASIM (acute kidney injury)   -NASIM noted on 4/30 and likely hemodynamically mediated from relative hypotension  -Monitor Cr  -Holding Losartan, Lasix and Coreg    Hypertension  -Holding Losartan, Lasix and Coreg  -Reintroduce Coreg first if BP improves    T2 Diabetes.   -pt takes repaglinide only, sugars are currently in range.    Hyperlipidemia.   ·  Plan: -c/w lipitor.    CAD (coronary artery disease).   -s/p CABG  -c/w ASA, statin.    Chronic systolic congestive heart failure.   -s/p CICU stay in 2014 where he was found to be a candidate for biV ICD w/ recent lead replacement in January 2023 w/ Dr. Ivey  -continue current home meds: coreg, losartan, lasix  -appears euvolemic     Discharge/Dispo/Med rec discussed with attending Dr. Urban who determined patient medically cleared for discharge to [facility] with outpatient follow up.

## 2023-04-29 NOTE — DISCHARGE NOTE PROVIDER - NSDCCPCAREPLAN_GEN_ALL_CORE_FT
PRINCIPAL DISCHARGE DIAGNOSIS  Diagnosis: Hip fracture, left  Assessment and Plan of Treatment:       SECONDARY DISCHARGE DIAGNOSES  Diagnosis: Alcohol dependence with withdrawal  Assessment and Plan of Treatment: Treatment for alcohol dependence and withdrawal includes medicines, detoxification, and therapy. Diagnosing and treating alcohol dependence and withdrawal as soon as possible may relieve or prevent symptoms. With treatment and care, your alcohol dependence and withdrawal may be controlled, and your quality of life improved.  Keep all follow up appointments. Write down any questions you may have. This way you will remember to ask these questions during your next visit.       PRINCIPAL DISCHARGE DIAGNOSIS  Diagnosis: Hip fracture, left  Assessment and Plan of Treatment: -s/p mechanical fall on 4/27/23 with displaced intertrochanteric fracture  -s/p left femur Intramedullary nail on 4/29  -Physical Therapy recommends [facility]      SECONDARY DISCHARGE DIAGNOSES  Diagnosis: Diabetes  Assessment and Plan of Treatment: Make sure you get your HgA1c checked every three months.  If you take oral diabetes medications, check your blood glucose at least two times a day.  If you take short-acting insulin, check your blood glucose before meals and at bedtime.  It's important not to skip any meals.  Keep a log of your blood glucose results and always take it with you to your doctor appointments.  Keep a list of your current medications including over the counter medications and bring this medication list with you to all your doctor appointments.  If you have not seen your ophthalmologist this year, call for appointment.  Check your feet daily for redness, sores, or openings.  Do not self treat.  If there is no improvement in two days, call your primary care physician for an appointment.  HgA1c this admission was 5.9      Diagnosis: Hypertension  Assessment and Plan of Treatment: Continue to follow a low salt/sodium diet.  Perform physical activities as tolerated in consultation with your Primary Care Provider and physical therapist.  Take all medications as prescribed.  Follow up with your medical doctor for routine blood pressure monitoring at your next visit.  Notify your doctor if you have any of the following symptoms:  Dizziness, lightheadedness, blurry vision, headache, chest pain, or shortness of breath.      Diagnosis: CAD (coronary artery disease)  Assessment and Plan of Treatment: Coronary artery disease is a condition where the arteries the supply the heart muscle get clogged with fatty deposits & puts you at risk for a heart attack.  Call your doctor if you have any new pain, pressure, or discomfort in the center of your chest, pain, tingling or discomfort in arms, back, neck, jaw, or stomach, shortness of breath, nausea, vomiting, burping or heartburn, sweating, cold and clammy skin, racing or abnormal heartbeat for more than 10 minutes or if they keep coming & going.  Call 911 and do not try to get to hospital by car.  You can help yourself with lifestyle changes (quitting smoking if you If you are on medication to help you quit smoking, be sure to take it as prescribed. Find healthy ways to deal with stress, such as exercise (check with your healthcare provider first), deep breathing, meditation, or enjoyable healthy hobbies.  Avoid situations that may cause you to smoke a cigarette.  Look for help with quitting; you are not alone. A resource to help you stop smoking is the [facility] for Tobacco Control – phone number 518-703-0974.), eat lots of fruits & vegetables & low fat dairy products, not a lot of meat & fatty foods, walk or some form of physical activity most days of the week, lose weight if you are overweight.  Take your cardiac medication as prescribed to lower cholesterol, to lower blood pressure, and control your blood sugar.      Diagnosis: Chronic systolic congestive heart failure  Assessment and Plan of Treatment: Take all medications as prescribed.  Stop smoking if you currently If you are on medication to help you quit smoking, be sure to take it as prescribed. Find healthy ways to deal with stress, such as exercise (check with your healthcare provider first), deep breathing, meditation, or enjoyable healthy hobbies.  Avoid situations that may cause you to smoke a cigarette.  Look for help with quitting; you are not alone. A resource to help you stop smoking is the [facility] for Tobacco Control – phone number 197-037-1062., and avoid high altitudes.  Weigh yourself daily.  If you gain 3lbs in 3 days, or 5lbs in a week call your Health Care Provider.  Eat a low sodium diet.  If you have pulmonary hypertension and you are a female of child bearing age, talk to your caregiver about using birth control pills or getting pregnant.  Call your Health Care Provider if you have any swelling or increased swelling in your feet, ankles, and/or stomach.  If you experience dizziness, chest pain, or shortness of breath, seek immediate medical attention.      Diagnosis: NASIM (acute kidney injury)  Assessment and Plan of Treatment: Resolved      Diagnosis: Hyperlipidemia  Assessment and Plan of Treatment: Low salt, low fat, low cholesterol, diabetic diet    Continue medication as prescribed  Exercise, increase your activity level      Diagnosis: Alcohol dependence with withdrawal  Assessment and Plan of Treatment: Treatment for alcohol dependence and withdrawal includes medicines, detoxification, and therapy. Diagnosing and treating alcohol dependence and withdrawal as soon as possible may relieve or prevent symptoms. With treatment and care, your alcohol dependence and withdrawal may be controlled, and your quality of life improved.  Keep all follow up appointments. Write down any questions you may have. This way you will remember to ask these questions during your next visit.

## 2023-04-29 NOTE — DISCHARGE NOTE PROVIDER - NSDCFUADDAPPT_GEN_ALL_CORE_FT
APPTS ARE READY TO BE MADE: [x] YES    Best Family or Patient Contact (if needed):    Additional Information about above appointments (if needed):    1:   2:   3:     Other comments or requests:    APPTS ARE READY TO BE MADE: [x] YES    Best Family or Patient Contact (if needed):    Additional Information about above appointments (if needed):    1: Orthopedics  2: PCP  3:     Other comments or requests:    APPTS ARE READY TO BE MADE: [x] YES    Best Family or Patient Contact (if needed):    Additional Information about above appointments (if needed):    1: Orthopedics  2: PCP  3:     Other comments or requests:   Patient was provided with follow up request details and was advised to call to schedule follow up within specified time frame.

## 2023-04-29 NOTE — CHART NOTE - NSCHARTNOTEFT_GEN_A_CORE
Post-operative Check    SUBJECTIVE:   No acute events in the immediate post-operative period.  Patient feeling well.  Pain well controlled.   Denies CP    OBJECTIVE:  T(C): 36.3 (04-29-23 @ 18:20), Max: 37.3 (04-29-23 @ 00:19)  HR: 85 (04-29-23 @ 18:35) (72 - 92)  BP: 158/70 (04-29-23 @ 18:30) (116/60 - 170/78)  RR: 14 (04-29-23 @ 18:35) (12 - 20)  SpO2: 100% (04-29-23 @ 18:35) (93% - 100%)      Physical Exam:   - Constitutional:  NAD  - Respiratory: nonlabored  - Extremity:      Dressing c/d/i     + TA/Gas/EHL/FHL      SILT     Compartments soft     DP/PT palpable        ASSESSMENT:   NAOMI MORRISSEY is a 73y Male sp L hip IMN on 4/29. Recovering appropriately    PLAN:  - Multimodal pain control  - WBAT  - DVT: Lovenox  - Abx 24hr  - Resume home meds  - PT/OT  - Care per primary team  - Dispo: pending PT    [place]opaedic [place]  [id number]  [facility]        [id number]  [facility]  [id number]/ [phone]

## 2023-04-29 NOTE — DISCHARGE NOTE PROVIDER - NSDCMRMEDTOKEN_GEN_ALL_CORE_FT
aspirin 81 mg oral tablet, chewable: 1 tab(s) chewed once a day  atorvastatin 80 mg oral tablet: 1 tab(s) orally every other day (at bedtime)  carvedilol 25 mg oral tablet: 1 tab(s) orally 2 times a day  furosemide 40 mg oral tablet: 1 tab(s) orally once a day  gabapentin 300 mg oral capsule: 1 cap(s) orally 2 times a day  losartan 100 mg oral tablet: 1 tab(s) orally once a day  otc supplements: magnesium oxide 420mg / potassium (otc) / multivitamin / vitamin d3 5,000IU / taurine 500mg / folic acid 0.8mg / milk thistle / vitamin b complex  repaglinide 1 mg oral tablet: 1 tab(s) orally once a day (before meal)  traZODone 100 mg oral tablet: 1 tab(s) orally once a day (at bedtime)   acetaminophen 500 mg oral tablet: 1 tab(s) orally every 6 hours as needed for  mild pain (1-3)  aspirin 81 mg oral tablet, chewable: 1 tab(s) orally once a day  atorvastatin 80 mg oral tablet: 1 tab(s) orally once a day (at bedtime)  carvedilol 25 mg oral tablet: 1 tab(s) orally every 12 hours  furosemide 40 mg oral tablet: 1 tab(s) orally once a day  gabapentin 300 mg oral capsule: 1 cap(s) orally 2 times a day  latanoprost 0.005% ophthalmic solution: 1 drop(s) to each affected eye once a day (at bedtime)  losartan 50 mg oral tablet: 1 orally once a day  oxyCODONE 5 mg oral tablet: 1 tab(s) orally every 4 hours as needed for  severe pain (7-9)  polyethylene glycol 3350 oral powder for reconstitution: 17 gram(s) orally once a day  repaglinide 1 mg oral tablet: 1 tab(s) orally once a day (before meal)  senna leaf extract oral tablet: 2 tab(s) orally once a day (at bedtime)  traZODone 100 mg oral tablet: 1 tab(s) orally once a day (at bedtime)   acetaminophen 500 mg oral tablet: 1 tab(s) orally every 6 hours as needed for  mild pain (1-3)  aspirin 81 mg oral tablet, chewable: 1 tab(s) orally once a day  atorvastatin 80 mg oral tablet: 1 tab(s) orally once a day (at bedtime)  carvedilol 25 mg oral tablet: 1 tab(s) orally every 12 hours  furosemide 40 mg oral tablet: 1 tab(s) orally once a day  gabapentin 300 mg oral capsule: 1 cap(s) orally 2 times a day  latanoprost 0.005% ophthalmic solution: 1 drop(s) to each affected eye once a day (at bedtime)  losartan 50 mg oral tablet: 1 orally once a day  polyethylene glycol 3350 oral powder for reconstitution: 17 gram(s) orally once a day  repaglinide 1 mg oral tablet: 1 tab(s) orally once a day (before meal)  senna leaf extract oral tablet: 2 tab(s) orally once a day (at bedtime)  traMADol 50 mg oral tablet: 0.5 tab(s) orally every 6 hours As needed Severe Pain (7 - 10)  traZODone 100 mg oral tablet: 1 tab(s) orally once a day (at bedtime)

## 2023-04-29 NOTE — PROGRESS NOTE ADULT - SUBJECTIVE AND OBJECTIVE BOX
Orthopedics      Patient seen and examined at bedside. Feeling well. Pain controlled. No n/v. No acute events overnight.    Vital Signs Last 24 Hrs  T(C): 36.8 (04-29-23 @ 05:45), Max: 37.3 (04-29-23 @ 00:19)  T(F): 98.2 (04-29-23 @ 05:45), Max: 99.2 (04-29-23 @ 00:19)  HR: 92 (04-29-23 @ 05:45) (72 - 93)  BP: 159/80 (04-29-23 @ 05:45) (118/79 - 181/76)  BP(mean): --  RR: 18 (04-29-23 @ 05:45) (16 - 20)  SpO2: 96% (04-29-23 @ 05:45) (93% - 100%)      PT/INR - ( 28 Apr 2023 12:10 )   PT: 10.4 sec;   INR: 0.91 ratio         PTT - ( 28 Apr 2023 12:10 )  PTT:25.7 sec      LLE:  Shortened and externally rotated extremity   Skin intact, no erythema or ecchymosis  TTP at L hip, no other TTP along LLE  +EHL/FHL/TA/GSC  +SILT L2-S1  + DP  Compartments soft and compressible  No calf tenderness    A/P: 73M with L intertrochanteric fracture s/p mechanical fall    Recommend medical admission   Plan for left hip IMN today with Dr. Castrejon  NPO/IVF  Hold chemical DVT ppx  PPM interrogated 1/23 (document in allscripts)   Medical optimization noted  Analgesia as needed  NWB/bedrest   Discussed with son at bedside, all questions answered.   Will discuss with Dr. Castrejon and adjust plan as needed

## 2023-04-29 NOTE — DISCHARGE NOTE PROVIDER - CARE PROVIDERS DIRECT ADDRESSES
[email or web address]

## 2023-04-30 DIAGNOSIS — N17.9 ACUTE KIDNEY FAILURE, UNSPECIFIED: ICD-10-CM

## 2023-04-30 LAB
ANION GAP SERPL CALC-SCNC: 13 MMOL/L — SIGNIFICANT CHANGE UP (ref 5–17)
BUN SERPL-MCNC: 29 MG/DL — HIGH (ref 7–23)
CALCIUM SERPL-MCNC: 9.1 MG/DL — SIGNIFICANT CHANGE UP (ref 8.4–10.5)
CHLORIDE SERPL-SCNC: 99 MMOL/L — SIGNIFICANT CHANGE UP (ref 96–108)
CO2 SERPL-SCNC: 24 MMOL/L — SIGNIFICANT CHANGE UP (ref 22–31)
CREAT SERPL-MCNC: 1.52 MG/DL — HIGH (ref 0.5–1.3)
EGFR: 47 ML/MIN/1.73M2 — LOW
GLUCOSE SERPL-MCNC: 268 MG/DL — HIGH (ref 70–99)
HCT VFR BLD CALC: 28.9 % — LOW (ref 39–50)
HGB BLD-MCNC: 9.1 G/DL — LOW (ref 13–17)
MCHC RBC-ENTMCNC: 28.8 PG — SIGNIFICANT CHANGE UP (ref 27–34)
MCHC RBC-ENTMCNC: 31.5 GM/DL — LOW (ref 32–36)
MCV RBC AUTO: 91.5 FL — SIGNIFICANT CHANGE UP (ref 80–100)
NRBC # BLD: 0 /100 WBCS — SIGNIFICANT CHANGE UP (ref 0–0)
PLATELET # BLD AUTO: 127 K/UL — LOW (ref 150–400)
POTASSIUM SERPL-MCNC: 4.2 MMOL/L — SIGNIFICANT CHANGE UP (ref 3.5–5.3)
POTASSIUM SERPL-SCNC: 4.2 MMOL/L — SIGNIFICANT CHANGE UP (ref 3.5–5.3)
RBC # BLD: 3.16 M/UL — LOW (ref 4.2–5.8)
RBC # FLD: 13.9 % — SIGNIFICANT CHANGE UP (ref 10.3–14.5)
SODIUM SERPL-SCNC: 136 MMOL/L — SIGNIFICANT CHANGE UP (ref 135–145)
WBC # BLD: 8.67 K/UL — SIGNIFICANT CHANGE UP (ref 3.8–10.5)
WBC # FLD AUTO: 8.67 K/UL — SIGNIFICANT CHANGE UP (ref 3.8–10.5)

## 2023-04-30 PROCEDURE — 99233 SBSQ HOSP IP/OBS HIGH 50: CPT

## 2023-04-30 RX ORDER — ATORVASTATIN CALCIUM 80 MG/1
80 TABLET, FILM COATED ORAL AT BEDTIME
Refills: 0 | Status: DISCONTINUED | OUTPATIENT
Start: 2023-04-30 | End: 2023-05-05

## 2023-04-30 RX ORDER — ASPIRIN/CALCIUM CARB/MAGNESIUM 324 MG
81 TABLET ORAL DAILY
Refills: 0 | Status: DISCONTINUED | OUTPATIENT
Start: 2023-04-30 | End: 2023-05-05

## 2023-04-30 RX ADMIN — Medication 500 MILLIGRAM(S): at 07:40

## 2023-04-30 RX ADMIN — Medication 1.5 MILLIGRAM(S): at 06:40

## 2023-04-30 RX ADMIN — Medication 500 MILLIGRAM(S): at 00:04

## 2023-04-30 RX ADMIN — ENOXAPARIN SODIUM 30 MILLIGRAM(S): 100 INJECTION SUBCUTANEOUS at 06:41

## 2023-04-30 RX ADMIN — Medication 500 MILLIGRAM(S): at 06:41

## 2023-04-30 RX ADMIN — OXYCODONE HYDROCHLORIDE 5 MILLIGRAM(S): 5 TABLET ORAL at 11:04

## 2023-04-30 RX ADMIN — Medication 1.5 MILLIGRAM(S): at 18:58

## 2023-04-30 RX ADMIN — OXYCODONE HYDROCHLORIDE 10 MILLIGRAM(S): 5 TABLET ORAL at 19:39

## 2023-04-30 RX ADMIN — OXYCODONE HYDROCHLORIDE 10 MILLIGRAM(S): 5 TABLET ORAL at 20:39

## 2023-04-30 RX ADMIN — Medication 500 MILLIGRAM(S): at 01:04

## 2023-04-30 RX ADMIN — OXYCODONE HYDROCHLORIDE 5 MILLIGRAM(S): 5 TABLET ORAL at 12:00

## 2023-04-30 RX ADMIN — Medication 1.5 MILLIGRAM(S): at 09:33

## 2023-04-30 RX ADMIN — Medication 500 MILLIGRAM(S): at 11:04

## 2023-04-30 RX ADMIN — Medication 1.5 MILLIGRAM(S): at 03:10

## 2023-04-30 RX ADMIN — Medication 81 MILLIGRAM(S): at 12:43

## 2023-04-30 RX ADMIN — POLYETHYLENE GLYCOL 3350 17 GRAM(S): 17 POWDER, FOR SOLUTION ORAL at 11:04

## 2023-04-30 RX ADMIN — Medication 100 MILLIGRAM(S): at 06:38

## 2023-04-30 RX ADMIN — Medication 500 MILLIGRAM(S): at 12:00

## 2023-04-30 RX ADMIN — Medication 500 MILLIGRAM(S): at 18:56

## 2023-04-30 RX ADMIN — Medication 2 MILLIGRAM(S): at 16:47

## 2023-04-30 RX ADMIN — Medication 1.5 MILLIGRAM(S): at 14:58

## 2023-04-30 RX ADMIN — Medication 2 MILLIGRAM(S): at 12:37

## 2023-04-30 NOTE — PROGRESS NOTE ADULT - SUBJECTIVE AND OBJECTIVE BOX
Patient is a 73y old  Male who presents with a chief complaint of L intertrochanteric fracture (2023 06:41)      SUBJECTIVE / OVERNIGHT EVENTS: Patient s/p successful left femur IMN. He was agitated in the PACU and on 4DSU, and ripped out his IV. Now calm after ativan administration.    MEDICATIONS  (STANDING):  acetaminophen     Tablet .. 500 milliGRAM(s) Oral every 6 hours  atorvastatin 80 milliGRAM(s) Oral at bedtime  enoxaparin Injectable 30 milliGRAM(s) SubCutaneous every 24 hours  LORazepam     Tablet 1.5 milliGRAM(s) Oral every 4 hours  polyethylene glycol 3350 17 Gram(s) Oral daily  senna 2 Tablet(s) Oral at bedtime    MEDICATIONS  (PRN):  LORazepam   Injectable 2 milliGRAM(s) IV Push every 2 hours PRN Symptom-triggered: 2 point increase in CIWA -Ar score and a total score of 7 or LESS  magnesium hydroxide Suspension 30 milliLiter(s) Oral daily PRN Constipation  oxyCODONE    IR 5 milliGRAM(s) Oral every 4 hours PRN Moderate Pain (4 - 6)  oxyCODONE    IR 10 milliGRAM(s) Oral every 4 hours PRN Severe Pain (7 - 10)  traMADol 25 milliGRAM(s) Oral every 6 hours PRN Mild Pain (1 - 3)      CAPILLARY BLOOD GLUCOSE        I&O's Summary    2023 07:01  -  2023 07:00  --------------------------------------------------------  IN: 0 mL / OUT: 50 mL / NET: -50 mL        PHYSICAL EXAM:  Vital Signs Last 24 Hrs  T(C): 37.1 (2023 04:25), Max: 37.1 (2023 04:25)  T(F): 98.7 (2023 04:25), Max: 98.7 (2023 04:25)  HR: 80 (2023 04:25) (77 - 88)  BP: 92/60 (2023 04:25) (91/52 - 158/70)  BP(mean): 98 (2023 19:30) (81 - 100)  RR: 18 (2023 04:25) (12 - 18)  SpO2: 94% (2023 04:25) (94% - 100%)    Parameters below as of 2023 04:25  Patient On (Oxygen Delivery Method): room air        GEN: male in NAD, appears comfortable, no diaphoresis  EYES: No scleral injection, PERRL, EOMI  ENTM: neck supple & symmetric without tracheal deviation, moist membranes, no gross hearing impairment, thyroid gland not enlarged  CV: +S1/S2, no m/r/g, no abdominal bruit, no LE edema  RESP: breathing comfortably, no respiratory accessory muscle use, CTAB, no w/r/r  GI: normoactive BS, soft, NTND, no rebounding/guarding, no palpable masses    LABS:                        9.1    8.67  )-----------( 127      ( 2023 07:08 )             28.9     04-30    136  |  99  |  29<H>  ----------------------------<  268<H>  4.2   |  24  |  1.52<H>    Ca    9.1      2023 07:08    TPro  8.1  /  Alb  4.3  /  TBili  0.4  /  DBili  x   /  AST  48<H>  /  ALT  52<H>  /  AlkPhos  107  04-28    PT/INR - ( 2023 13:08 )   PT: 11.4 sec;   INR: 0.98 ratio         PTT - ( 2023 13:08 )  PTT:28.0 sec      Urinalysis Basic - ( 2023 12:26 )    Color: Yellow / Appearance: Clear / S.018 / pH: x  Gluc: x / Ketone: Negative  / Bili: Negative / Urobili: Negative   Blood: x / Protein: 30 mg/dL / Nitrite: Negative   Leuk Esterase: Negative / RBC: 1 /hpf / WBC 1 /HPF   Sq Epi: x / Non Sq Epi: x / Bacteria: Negative          RADIOLOGY & ADDITIONAL TESTS:  Results Reviewed:   Imaging Personally Reviewed:  Electrocardiogram Personally Reviewed:    COORDINATION OF CARE:  Care Discussed with Consultants/Other Providers [Y/N]:  Prior or Outpatient Records Reviewed [Y/N]:

## 2023-04-30 NOTE — PHYSICAL THERAPY INITIAL EVALUATION ADULT - NSPTDISCHREC_GEN_A_CORE
[facility] General HPI





- HPI Summary


HPI Summary: 





Patient had stitches placed in his left ear 5 days ago and he returns for 

removal today.  Denies any associated headache.  He has no complaints.





- History of Current Complaint


Chief Complaint: UCLaceration


Stated Complaint: STICHES REMOVAL - DONE HERE


Time Seen by Provider: 10/10/18 12:33


Hx Obtained From: Patient


Pain Intensity: 0


Associated Signs & Symptoms: Negative: Fever, Headache, Nausea





- Allergy/Home Medications


Allergies/Adverse Reactions: 


 Allergies











Allergy/AdvReac Type Severity Reaction Status Date / Time


 


Penicillins Allergy  Anaphylatic Verified 10/10/18 12:33





   Shock  














PMH/Surg Hx/FS Hx/Imm Hx





- Additional Past Medical History


Additional PMH: 





asthma, hypotension, copd





- Surgical History


Surgical History: Yes


Surgery Procedure, Year, and Place: Cataracts 10/15





- Family History


Known Family History: Positive: Hypertension





- Social History


Occupation: Retired


Alcohol Use: None


Substance Use Type: None


Smoking Status (MU): Former Smoker


When Did the Patient Quit Smoking/Using Tobacco: 96





- Immunization History


Most Recent Tetanus Shot: ~2014/15


Hx Tetanus, Diphtheria Vaccination: Yes


Vaccination Up to Date: Yes





Review of Systems


Constitutional: Negative


Skin: Negative


Eyes: Negative


ENT: Negative


Respiratory: Cough - chronic


Cardiovascular: Negative


Gastrointestinal: Negative


Genitourinary: Negative


Motor: Negative


Neurovascular: Negative


Musculoskeletal: Negative


Neurological: Negative


Psychological: Negative


Is Patient Immunocompromised?: No


All Other Systems Reviewed And Are Negative: Yes





Physical Exam


Triage Information Reviewed: Yes


Appearance: Well-Appearing


Vital Signs: 


 Initial Vital Signs











Temp  97.5 F   10/10/18 12:33


 


Pulse  72   10/10/18 12:33


 


Resp  18   10/10/18 12:33


 


BP  125/46   10/10/18 12:33


 


Pulse Ox  96   10/10/18 12:33











Vital Signs Reviewed: Yes


Eyes: Positive: Conjunctiva Clear


ENT: Positive: Normal ENT inspection


Neck: Positive: Supple


Respiratory: Positive: Lungs clear, Decreased breath sounds


Cardiovascular: Positive: RRR, No Murmur


Abdomen Description: Positive: Nontender, No Organomegaly, Soft


Bowel Sounds: Positive: Present


Musculoskeletal: Positive: ROM Intact


Neurological: Positive: Alert


Psychological: Positive: Age Appropriate Behavior


Skin Exam: Normal, Other - 5 sutures noted the top of left ear.  The wound has 

healed.  There is mild scabbing but no erythema, swelling or bruising





Course/Dx





- Course


Course Of Treatment: Procedure 5 stitches removed from the left ear.  Patient 

tolerated well.





- Differential Dx - Multi-Symptom


Provider Diagnoses: Suture removal left ear.





Discharge





- Sign-Out/Discharge


Documenting (check all that apply): Patient Departure


All imaging exams completed and their final reports reviewed: No Studies





- Discharge Plan


Condition: Stable


Disposition: HOME


Patient Education Materials:  Stitches Removal (ED)


Referrals: 


Jasiel Granda DO [Primary Care Provider] - If Needed





- Billing Disposition and Condition


Condition: STABLE


Disposition: Home Sub-acute Rehab

## 2023-04-30 NOTE — PHYSICAL THERAPY INITIAL EVALUATION ADULT - ADDITIONAL COMMENTS
Pt is confused and unable to provide info on prior level of function. As per son Mark, Patient lives in a private home with his wife. Pt's son is with his father 80% of the time. He notes that prior to admission pt was independent with all ADL's and did not use an assistive device to ambulate. Pt has 76 steps to enter home.

## 2023-04-30 NOTE — PHYSICAL THERAPY INITIAL EVALUATION ADULT - PLANNED THERAPY INTERVENTIONS, PT EVAL
GOAL: Pt will negotiate up/down 1 flight of stairs with unilateral handrail ascending independently in 2 weeks/balance training/gait training/strengthening/transfer training

## 2023-04-30 NOTE — PROGRESS NOTE ADULT - SUBJECTIVE AND OBJECTIVE BOX
SUBJECTIVE:     Overnight patient attempted to get out of bed multiple times    Patient seen and examined at bedside. Pt doing generally well.    Endorses some pain. RN at bedside to get medication  Still attempting to get out of bed   Reinforced the importance of safety and waiting for PT to help with mobility      Will  used    OBJECTIVE:  Vital Signs Last 24 Hrs  T(C): 37.1 (30 Apr 2023 04:25), Max: 37.1 (30 Apr 2023 04:25)  T(F): 98.7 (30 Apr 2023 04:25), Max: 98.7 (30 Apr 2023 04:25)  HR: 80 (30 Apr 2023 04:25) (77 - 88)  BP: 92/60 (30 Apr 2023 04:25) (91/52 - 158/70)  BP(mean): 98 (29 Apr 2023 19:30) (81 - 100)  RR: 18 (30 Apr 2023 04:25) (12 - 18)  SpO2: 94% (30 Apr 2023 04:25) (94% - 100%)    Parameters below as of 30 Apr 2023 04:25  Patient On (Oxygen Delivery Method): room air        General: NAD  Resp: Non-labored breathing, no accessory muscle use  Left Lower extremity:          Dressing: clean/dry/intact            Sensation: SILT         Motor exam: 5/5 TA/GS/EHL/FHL         warm well perfused          compartments soft        +DP pulse        capillary refill <3 seconds     LABS:                        11.2   9.64  )-----------( 121      ( 29 Apr 2023 18:52 )             35.7     04-29    137  |  99  |  19  ----------------------------<  101<H>  4.2   |  27  |  1.16    Ca    9.2      29 Apr 2023 18:52    TPro  8.1  /  Alb  4.3  /  TBili  0.4  /  DBili  x   /  AST  48<H>  /  ALT  52<H>  /  AlkPhos  107  04-28    I&O's Summary    28 Apr 2023 07:01  -  29 Apr 2023 07:00  --------------------------------------------------------  IN: 0 mL / OUT: 500 mL / NET: -500 mL    29 Apr 2023 07:01  -  30 Apr 2023 06:42  --------------------------------------------------------  IN: 0 mL / OUT: 50 mL / NET: -50 mL        MEDS:  MEDICATIONS  (STANDING):  acetaminophen     Tablet .. 500 milliGRAM(s) Oral every 6 hours  ceFAZolin   IVPB 2000 milliGRAM(s) IV Intermittent every 8 hours  enoxaparin Injectable 30 milliGRAM(s) SubCutaneous every 24 hours  LORazepam     Tablet 1.5 milliGRAM(s) Oral every 4 hours  polyethylene glycol 3350 17 Gram(s) Oral daily  senna 2 Tablet(s) Oral at bedtime  sodium chloride 0.9%. 1000 milliLiter(s) (100 mL/Hr) IV Continuous <Continuous>    MEDICATIONS  (PRN):  magnesium hydroxide Suspension 30 milliLiter(s) Oral daily PRN Constipation  oxyCODONE    IR 5 milliGRAM(s) Oral every 4 hours PRN Moderate Pain (4 - 6)  oxyCODONE    IR 10 milliGRAM(s) Oral every 4 hours PRN Severe Pain (7 - 10)  traMADol 25 milliGRAM(s) Oral every 6 hours PRN Mild Pain (1 - 3)

## 2023-04-30 NOTE — OCCUPATIONAL THERAPY INITIAL EVALUATION ADULT - BALANCE TRAINING, PT EVAL
independent GOAL: Pt will demonstrate improved static/dynamic balance to fair in order to increase safety and independence in ADLs within 4 weeks

## 2023-04-30 NOTE — OCCUPATIONAL THERAPY INITIAL EVALUATION ADULT - PERTINENT HX OF CURRENT PROBLEM, REHAB EVAL
73M w/ PMHx CAD s/p CABG (2004), CHF w/ biV ICD w/ recent lead replacement, HTN, DM, HLD, EtOH use admitted after a fall on 4/27 resulting in L intertrochanteric fracture. History obtained from pt and his son bedside. Patient drinks approx 1pint EtOH daily (except Monday/Tuesday), last drink was 4/27 in the late evening. Patient had a mechanical fall down several stairs in his house and landed on his L hip after which he was not able to bear weight on that side. Per patient's son, he has had episodes of EtOH withdrawal in the past where he has required restraints, though unclear if patient required MICU stay or intubation due to withdrawal. In the field, patient was given fentanyl, then upon arriving to the ED received IV tylenol and then morphine 4mg. Imaging showed acute mildly varus impacted oblique transverse intertrochanteric L prox femoral fracture. He was seen by orthopedics and is planned for OR tomorrow. Pt now s/p L femur IMN    Xray L Hip: Acute mildly varus impacted oblique transverse intertrochanteric left proximal femoral fracture.

## 2023-04-30 NOTE — PHYSICAL THERAPY INITIAL EVALUATION ADULT - PERTINENT HX OF CURRENT PROBLEM, REHAB EVAL
73M w/ PMHx CAD s/p CABG (2004), CHF w/ biV ICD w/ recent lead replacement, HTN, DM, HLD, EtOH use admitted after a fall on 4/27 resulting in L intertrochanteric fracture. Hx obtained from pt and his son bedside. Patient drinks approx 1pint EtOH daily (except Monday/Tuesday), last drink was 4/27 in the late evening. Pt had a mechanical fall down several stairs in his house and landed on his L hip after which he was not able to bear weight on that side. Per patient's son, he has had episodes of EtOH withdrawal in the past where he has required restraints, though unclear if patient required MICU stay or intubation due to withdrawal.   Hospital course: CXR: (-), Xray L femur/knee/ Hip: Acute mildly varus impacted oblique transverse intertrochanteric left proximal femoral fracture.

## 2023-04-30 NOTE — OCCUPATIONAL THERAPY INITIAL EVALUATION ADULT - LIVES WITH, PROFILE
Pt confused/impulsive, states he lives with family. Pts son states he lives with wife in private home, many steps to negotiate. Pt I in ADLs and ambulation prior to admission

## 2023-05-01 LAB
ANION GAP SERPL CALC-SCNC: 8 MMOL/L — SIGNIFICANT CHANGE UP (ref 5–17)
BUN SERPL-MCNC: 23 MG/DL — SIGNIFICANT CHANGE UP (ref 7–23)
CALCIUM SERPL-MCNC: 9.4 MG/DL — SIGNIFICANT CHANGE UP (ref 8.4–10.5)
CHLORIDE SERPL-SCNC: 104 MMOL/L — SIGNIFICANT CHANGE UP (ref 96–108)
CO2 SERPL-SCNC: 27 MMOL/L — SIGNIFICANT CHANGE UP (ref 22–31)
CREAT SERPL-MCNC: 1.13 MG/DL — SIGNIFICANT CHANGE UP (ref 0.5–1.3)
EGFR: 68 ML/MIN/1.73M2 — SIGNIFICANT CHANGE UP
GLUCOSE SERPL-MCNC: 105 MG/DL — HIGH (ref 70–99)
HCT VFR BLD CALC: 28.6 % — LOW (ref 39–50)
HGB BLD-MCNC: 9.2 G/DL — LOW (ref 13–17)
MCHC RBC-ENTMCNC: 29 PG — SIGNIFICANT CHANGE UP (ref 27–34)
MCHC RBC-ENTMCNC: 32.2 GM/DL — SIGNIFICANT CHANGE UP (ref 32–36)
MCV RBC AUTO: 90.2 FL — SIGNIFICANT CHANGE UP (ref 80–100)
NRBC # BLD: 0 /100 WBCS — SIGNIFICANT CHANGE UP (ref 0–0)
PLATELET # BLD AUTO: 134 K/UL — LOW (ref 150–400)
POTASSIUM SERPL-MCNC: 4.1 MMOL/L — SIGNIFICANT CHANGE UP (ref 3.5–5.3)
POTASSIUM SERPL-SCNC: 4.1 MMOL/L — SIGNIFICANT CHANGE UP (ref 3.5–5.3)
RBC # BLD: 3.17 M/UL — LOW (ref 4.2–5.8)
RBC # FLD: 13.9 % — SIGNIFICANT CHANGE UP (ref 10.3–14.5)
SODIUM SERPL-SCNC: 139 MMOL/L — SIGNIFICANT CHANGE UP (ref 135–145)
WBC # BLD: 7.24 K/UL — SIGNIFICANT CHANGE UP (ref 3.8–10.5)
WBC # FLD AUTO: 7.24 K/UL — SIGNIFICANT CHANGE UP (ref 3.8–10.5)

## 2023-05-01 PROCEDURE — 99232 SBSQ HOSP IP/OBS MODERATE 35: CPT

## 2023-05-01 RX ORDER — ENOXAPARIN SODIUM 100 MG/ML
40 INJECTION SUBCUTANEOUS EVERY 24 HOURS
Refills: 0 | Status: DISCONTINUED | OUTPATIENT
Start: 2023-05-02 | End: 2023-05-05

## 2023-05-01 RX ADMIN — Medication 1 MILLIGRAM(S): at 17:02

## 2023-05-01 RX ADMIN — Medication 1 MILLIGRAM(S): at 11:50

## 2023-05-01 RX ADMIN — Medication 500 MILLIGRAM(S): at 17:07

## 2023-05-01 RX ADMIN — ATORVASTATIN CALCIUM 80 MILLIGRAM(S): 80 TABLET, FILM COATED ORAL at 21:28

## 2023-05-01 RX ADMIN — Medication 500 MILLIGRAM(S): at 07:23

## 2023-05-01 RX ADMIN — Medication 500 MILLIGRAM(S): at 06:23

## 2023-05-01 RX ADMIN — Medication 1 MILLIGRAM(S): at 02:34

## 2023-05-01 RX ADMIN — Medication 2 MILLIGRAM(S): at 10:35

## 2023-05-01 RX ADMIN — Medication 0.5 MILLIGRAM(S): at 21:26

## 2023-05-01 RX ADMIN — Medication 1 MILLIGRAM(S): at 06:22

## 2023-05-01 RX ADMIN — Medication 1 MILLIGRAM(S): at 01:55

## 2023-05-01 RX ADMIN — ENOXAPARIN SODIUM 30 MILLIGRAM(S): 100 INJECTION SUBCUTANEOUS at 06:23

## 2023-05-01 NOTE — PROGRESS NOTE ADULT - SUBJECTIVE AND OBJECTIVE BOX
POD #2 s/p fem IMN    Overnight events: Agitated and disoriented    SUBJECTIVE: Pt seen and examined at bedside. As per bedside RNs, patient is disoriented and agitated. Patient is on ativan taper.      OBJECTIVE:  Vital Signs Last 24 Hrs  T(C): 36.2 (01 May 2023 04:59), Max: 37.1 (30 Apr 2023 21:13)  T(F): 97.2 (01 May 2023 04:59), Max: 98.7 (30 Apr 2023 21:13)  HR: 97 (01 May 2023 04:59) (84 - 110)  BP: 131/68 (01 May 2023 04:59) (109/58 - 144/69)  BP(mean): --  RR: 18 (01 May 2023 04:59) (18 - 18)  SpO2: 100% (01 May 2023 04:59) (95% - 100%)    Parameters below as of 01 May 2023 04:59  Patient On (Oxygen Delivery Method): room air          04-29-23 @ 07:01  -  04-30-23 @ 07:00  --------------------------------------------------------  IN: 0 mL / OUT: 50 mL / NET: -50 mL    04-30-23 @ 07:01  -  05-01-23 @ 06:20  --------------------------------------------------------  IN: 600 mL / OUT: 550 mL / NET: 50 mL        Physical Examination:  GEN: NAD, resting quietly  PULM: symmetric chest rise bilaterally, no increased WOB  ABD: nondistended  EXTR:   Left Lower extremity:          Dressing: clean/dry/intact            Sensation: SILT         Motor exam: 5/5 TA/GS/EHL/FHL         warm well perfused          compartments soft        +DP pulse        capillary refill <3 seconds       LABS:                        9.1    8.67  )-----------( 127      ( 30 Apr 2023 07:08 )             28.9       04-30    136  |  99  |  29<H>  ----------------------------<  268<H>  4.2   |  24  |  1.52<H>    Ca    9.1      30 Apr 2023 07:08

## 2023-05-01 NOTE — PROGRESS NOTE ADULT - SUBJECTIVE AND OBJECTIVE BOX
Rancho Urban MD  Division of Hospital Medicine  Available on MS teams until 7pm  If no response or off-hours, page [phone]  -------------------------------------    Patient is a 73y old  Male who presents with a chief complaint of L intertrochanteric fracture (01 May 2023 06:20)    SUBJECTIVE / OVERNIGHT EVENTS: agitated overnight  ADDITIONAL REVIEW OF SYSTEMS: pt is somnolent today, arousable but cnofused and does not answer questions reliably. Will interpretation provided by examiner- pt states he 'feels ok,' but no other specific complaints offered.     MEDICATIONS  (STANDING):  acetaminophen     Tablet .. 500 milliGRAM(s) Oral every 6 hours  aspirin  chewable 81 milliGRAM(s) Oral daily  atorvastatin 80 milliGRAM(s) Oral at bedtime  enoxaparin Injectable 30 milliGRAM(s) SubCutaneous every 24 hours  LORazepam     Tablet 1 milliGRAM(s) Oral every 4 hours  polyethylene glycol 3350 17 Gram(s) Oral daily  senna 2 Tablet(s) Oral at bedtime    MEDICATIONS  (PRN):  bisacodyl Suppository 10 milliGRAM(s) Rectal daily PRN If no bowel movement  LORazepam   Injectable 2 milliGRAM(s) IV Push every 2 hours PRN Symptom-triggered: 2 point increase in CIWA -Ar score and a total score of 7 or LESS  magnesium hydroxide Suspension 30 milliLiter(s) Oral daily PRN Constipation  oxyCODONE    IR 5 milliGRAM(s) Oral every 4 hours PRN Moderate Pain (4 - 6)  oxyCODONE    IR 10 milliGRAM(s) Oral every 4 hours PRN Severe Pain (7 - 10)  traMADol 25 milliGRAM(s) Oral every 6 hours PRN Mild Pain (1 - 3)      CAPILLARY BLOOD GLUCOSE        I&O's Summary    30 Apr 2023 07:01  -  01 May 2023 07:00  --------------------------------------------------------  IN: 600 mL / OUT: 550 mL / NET: 50 mL        PHYSICAL EXAM:  Vital Signs Last 24 Hrs  T(C): 36.4 (01 May 2023 09:08), Max: 37.1 (30 Apr 2023 21:13)  T(F): 97.6 (01 May 2023 09:08), Max: 98.7 (30 Apr 2023 21:13)  HR: 86 (01 May 2023 09:08) (84 - 110)  BP: 128/68 (01 May 2023 09:08) (109/58 - 144/69)  BP(mean): --  RR: 18 (01 May 2023 09:08) (18 - 18)  SpO2: 99% (01 May 2023 09:08) (95% - 100%)    Parameters below as of 01 May 2023 09:08  Patient On (Oxygen Delivery Method): room air      CONSTITUTIONAL: NAD  EYES: PERRLA; conjunctiva and sclera clear  ENMT: MMM  NECK: Supple  RESPIRATORY: Normal respiratory effort; CTAB  CARDIOVASCULAR: RRR, no JVD, no peripheral edema   ABDOMEN: Nontender to palpation, normoactive BS, no guarding/rigidity  MUSCLOSKELETAL:  no clubbing/cyanosis, no joint swelling or tenderness to palpation  PSYCH:  unable to fully assess  NEUROLOGY: CN 2-12 are intact and symmetric; no gross sensory or motor deficits  SKIN: No rashes; no palpable lesions    LABS:                        9.2    7.24  )-----------( 134      ( 01 May 2023 07:25 )             28.6     05-01    139  |  104  |  23  ----------------------------<  105<H>  4.1   |  27  |  1.13    Ca    9.4      01 May 2023 07:25      PT/INR - ( 29 Apr 2023 13:08 )   PT: 11.4 sec;   INR: 0.98 ratio         PTT - ( 29 Apr 2023 13:08 )  PTT:28.0 sec            RADIOLOGY & ADDITIONAL TESTS:  Results Reviewed:   Imaging Personally Reviewed:  Electrocardiogram Personally Reviewed:    COORDINATION OF CARE:  Care Discussed with Consultants/Other Providers [Y/N]:  Prior or Outpatient Records Reviewed [Y/N]:

## 2023-05-02 PROCEDURE — 99232 SBSQ HOSP IP/OBS MODERATE 35: CPT

## 2023-05-02 RX ORDER — TRAZODONE HCL 50 MG
100 TABLET ORAL ONCE
Refills: 0 | Status: COMPLETED | OUTPATIENT
Start: 2023-05-02 | End: 2023-05-02

## 2023-05-02 RX ORDER — LATANOPROST 0.05 MG/ML
1 SOLUTION/ DROPS OPHTHALMIC; TOPICAL AT BEDTIME
Refills: 0 | Status: DISCONTINUED | OUTPATIENT
Start: 2023-05-02 | End: 2023-05-05

## 2023-05-02 RX ADMIN — OXYCODONE HYDROCHLORIDE 5 MILLIGRAM(S): 5 TABLET ORAL at 21:50

## 2023-05-02 RX ADMIN — Medication 0.5 MILLIGRAM(S): at 06:37

## 2023-05-02 RX ADMIN — Medication 500 MILLIGRAM(S): at 02:31

## 2023-05-02 RX ADMIN — Medication 0.5 MILLIGRAM(S): at 01:31

## 2023-05-02 RX ADMIN — Medication 0.5 MILLIGRAM(S): at 09:56

## 2023-05-02 RX ADMIN — Medication 500 MILLIGRAM(S): at 01:31

## 2023-05-02 RX ADMIN — Medication 500 MILLIGRAM(S): at 12:42

## 2023-05-02 RX ADMIN — OXYCODONE HYDROCHLORIDE 5 MILLIGRAM(S): 5 TABLET ORAL at 20:50

## 2023-05-02 RX ADMIN — Medication 500 MILLIGRAM(S): at 17:13

## 2023-05-02 RX ADMIN — Medication 81 MILLIGRAM(S): at 12:42

## 2023-05-02 RX ADMIN — SENNA PLUS 2 TABLET(S): 8.6 TABLET ORAL at 21:03

## 2023-05-02 RX ADMIN — ATORVASTATIN CALCIUM 80 MILLIGRAM(S): 80 TABLET, FILM COATED ORAL at 21:03

## 2023-05-02 RX ADMIN — Medication 500 MILLIGRAM(S): at 13:15

## 2023-05-02 RX ADMIN — Medication 0.5 MILLIGRAM(S): at 17:12

## 2023-05-02 RX ADMIN — POLYETHYLENE GLYCOL 3350 17 GRAM(S): 17 POWDER, FOR SOLUTION ORAL at 12:42

## 2023-05-02 RX ADMIN — ENOXAPARIN SODIUM 40 MILLIGRAM(S): 100 INJECTION SUBCUTANEOUS at 06:41

## 2023-05-02 RX ADMIN — Medication 500 MILLIGRAM(S): at 06:39

## 2023-05-02 NOTE — PROGRESS NOTE ADULT - SUBJECTIVE AND OBJECTIVE BOX
POD #2 s/p fem IMN    Overnight events: Agitated and disoriented    SUBJECTIVE: Pt seen and examined at bedside. As per bedside RNs, patient is disoriented and agitated, but otherwise no acute overnight events. Patient is on ativan taper.    Vital Signs (24 Hrs):  T(C): 36.8 (05-02-23 @ 04:17), Max: 37.1 (05-01-23 @ 21:28)  HR: 91 (05-02-23 @ 04:17) (86 - 94)  BP: 123/58 (05-02-23 @ 04:17) (123/58 - 161/69)  RR: 18 (05-02-23 @ 04:17) (18 - 18)  SpO2: 97% (05-02-23 @ 04:17) (97% - 99%)  Wt(kg): --    LABS:                          9.2    7.24  )-----------( 134      ( 01 May 2023 07:25 )             28.6     05-01    139  |  104  |  23  ----------------------------<  105<H>  4.1   |  27  |  1.13    Ca    9.4      01 May 2023 07:25        Physical Examination:  GEN: NAD, resting quietly  PULM: symmetric chest rise bilaterally, no increased WOB  ABD: nondistended  EXTR:   Left Lower extremity:          Dressing: clean/dry/intact            Sensation: SILT         Motor exam: 5/5 TA/GS/EHL/FHL         warm well perfused          compartments soft        +DP pulse        capillary refill <3 seconds           Assessment and Plan:   · Assessment	  NAOMI MORRISSEY is a 73y Male sp L hip IMN on 4/29. Recovering appropriately from orthopedic standpoint    PLAN:  - Multimodal pain control  - WBAT  - DVT: Lovenox  - Resume home meds  - PT/OT  - Care per primary team  - Dispo: pending PT    Orthopaedic [place]  [facility] [phone]  [phone]        [id number]  [facility]  [id number]/ [phone].

## 2023-05-02 NOTE — PROGRESS NOTE ADULT - SUBJECTIVE AND OBJECTIVE BOX
Rancho Urban MD  Division of Hospital Medicine  Available on MS teams until 7pm  If no response or off-hours, page [phone]  -------------------------------------    Patient is a 73y old  Male who presents with a chief complaint of L intertrochanteric fracture (02 May 2023 06:45)      SUBJECTIVE / OVERNIGHT EVENTS: none acute  ADDITIONAL REVIEW OF SYSTEMS:    MEDICATIONS  (STANDING):  acetaminophen     Tablet .. 500 milliGRAM(s) Oral every 6 hours  aspirin  chewable 81 milliGRAM(s) Oral daily  atorvastatin 80 milliGRAM(s) Oral at bedtime  enoxaparin Injectable 40 milliGRAM(s) SubCutaneous every 24 hours  LORazepam     Tablet 0.5 milliGRAM(s) Oral every 4 hours  polyethylene glycol 3350 17 Gram(s) Oral daily  senna 2 Tablet(s) Oral at bedtime    MEDICATIONS  (PRN):  bisacodyl Suppository 10 milliGRAM(s) Rectal daily PRN If no bowel movement  LORazepam   Injectable 2 milliGRAM(s) IV Push every 2 hours PRN Symptom-triggered: 2 point increase in CIWA -Ar score and a total score of 7 or LESS  magnesium hydroxide Suspension 30 milliLiter(s) Oral daily PRN Constipation  oxyCODONE    IR 5 milliGRAM(s) Oral every 4 hours PRN Moderate Pain (4 - 6)  oxyCODONE    IR 10 milliGRAM(s) Oral every 4 hours PRN Severe Pain (7 - 10)  traMADol 25 milliGRAM(s) Oral every 6 hours PRN Mild Pain (1 - 3)      CAPILLARY BLOOD GLUCOSE        I&O's Summary    02 May 2023 07:01  -  02 May 2023 13:56  --------------------------------------------------------  IN: 360 mL / OUT: 0 mL / NET: 360 mL        PHYSICAL EXAM:  Vital Signs Last 24 Hrs  T(C): 36.8 (02 May 2023 12:53), Max: 37.1 (01 May 2023 21:28)  T(F): 98.2 (02 May 2023 12:53), Max: 98.7 (01 May 2023 21:28)  HR: 79 (02 May 2023 12:53) (79 - 94)  BP: 150/72 (02 May 2023 12:53) (123/58 - 161/69)  BP(mean): --  RR: 18 (02 May 2023 12:53) (18 - 18)  SpO2: 94% (02 May 2023 12:53) (94% - 99%)    Parameters below as of 02 May 2023 12:53  Patient On (Oxygen Delivery Method): room air      CONSTITUTIONAL: NAD  EYES: PERRLA; conjunctiva and sclera clear  ENMT: MMM  NECK: Supple  RESPIRATORY: Normal respiratory effort; CTAB  CARDIOVASCULAR: RRR, no JVD, no peripheral edema   ABDOMEN: Nontender to palpation, normoactive BS, no guarding/rigidity  MUSCLOSKELETAL:  no clubbing/cyanosis, no joint swelling or tenderness to palpation  PSYCH: A+O x 3, affect normal  NEUROLOGY: CN 2-12 are intact and symmetric; no gross sensory or motor deficits  SKIN: No rashes; no palpable lesions    LABS:                        9.2    7.24  )-----------( 134      ( 01 May 2023 07:25 )             28.6     05-01    139  |  104  |  23  ----------------------------<  105<H>  4.1   |  27  |  1.13    Ca    9.4      01 May 2023 07:25                  RADIOLOGY & ADDITIONAL TESTS:  Results Reviewed:   Imaging Personally Reviewed:  Electrocardiogram Personally Reviewed:    COORDINATION OF CARE:  Care Discussed with Consultants/Other Providers [Y/N]:  Prior or Outpatient Records Reviewed [Y/N]:   Rancho Urban MD  Division of Hospital Medicine  Available on MS teams until 7pm  If no response or off-hours, page [phone]  -------------------------------------    Patient is a 73y old  Male who presents with a chief complaint of L intertrochanteric fracture (02 May 2023 06:45)      SUBJECTIVE / OVERNIGHT EVENTS: none acute  ADDITIONAL REVIEW OF SYSTEMS:pt more calm, cooperative today- feels ok, no new complaints, tolerating PO, no tremor.     MEDICATIONS  (STANDING):  acetaminophen     Tablet .. 500 milliGRAM(s) Oral every 6 hours  aspirin  chewable 81 milliGRAM(s) Oral daily  atorvastatin 80 milliGRAM(s) Oral at bedtime  enoxaparin Injectable 40 milliGRAM(s) SubCutaneous every 24 hours  LORazepam     Tablet 0.5 milliGRAM(s) Oral every 4 hours  polyethylene glycol 3350 17 Gram(s) Oral daily  senna 2 Tablet(s) Oral at bedtime    MEDICATIONS  (PRN):  bisacodyl Suppository 10 milliGRAM(s) Rectal daily PRN If no bowel movement  LORazepam   Injectable 2 milliGRAM(s) IV Push every 2 hours PRN Symptom-triggered: 2 point increase in CIWA -Ar score and a total score of 7 or LESS  magnesium hydroxide Suspension 30 milliLiter(s) Oral daily PRN Constipation  oxyCODONE    IR 5 milliGRAM(s) Oral every 4 hours PRN Moderate Pain (4 - 6)  oxyCODONE    IR 10 milliGRAM(s) Oral every 4 hours PRN Severe Pain (7 - 10)  traMADol 25 milliGRAM(s) Oral every 6 hours PRN Mild Pain (1 - 3)      CAPILLARY BLOOD GLUCOSE        I&O's Summary    02 May 2023 07:01  -  02 May 2023 13:56  --------------------------------------------------------  IN: 360 mL / OUT: 0 mL / NET: 360 mL        PHYSICAL EXAM:  Vital Signs Last 24 Hrs  T(C): 36.8 (02 May 2023 12:53), Max: 37.1 (01 May 2023 21:28)  T(F): 98.2 (02 May 2023 12:53), Max: 98.7 (01 May 2023 21:28)  HR: 79 (02 May 2023 12:53) (79 - 94)  BP: 150/72 (02 May 2023 12:53) (123/58 - 161/69)  BP(mean): --  RR: 18 (02 May 2023 12:53) (18 - 18)  SpO2: 94% (02 May 2023 12:53) (94% - 99%)    Parameters below as of 02 May 2023 12:53  Patient On (Oxygen Delivery Method): room air      CONSTITUTIONAL: NAD  EYES: PERRLA; conjunctiva and sclera clear  ENMT: MMM  NECK: Supple  RESPIRATORY: Normal respiratory effort; CTAB  CARDIOVASCULAR: RRR, no JVD, no peripheral edema   ABDOMEN: Nontender to palpation, normoactive BS, no guarding/rigidity  MUSCLOSKELETAL:  no clubbing/cyanosis, no joint swelling or tenderness to palpation  PSYCH: A+O x1-2, CIWA 1-3  NEUROLOGY: CN 2-12 are intact and symmetric; no gross sensory or motor deficits  SKIN: No rashes; no palpable lesions    LABS:                        9.2    7.24  )-----------( 134      ( 01 May 2023 07:25 )             28.6     05-01    139  |  104  |  23  ----------------------------<  105<H>  4.1   |  27  |  1.13    Ca    9.4      01 May 2023 07:25                  RADIOLOGY & ADDITIONAL TESTS:  Results Reviewed:   Imaging Personally Reviewed:  Electrocardiogram Personally Reviewed:    COORDINATION OF CARE:  Care Discussed with Consultants/Other Providers [Y/N]:  Prior or Outpatient Records Reviewed [Y/N]:

## 2023-05-03 LAB
GLUCOSE BLDC GLUCOMTR-MCNC: 109 MG/DL — HIGH (ref 70–99)
GLUCOSE BLDC GLUCOMTR-MCNC: 120 MG/DL — HIGH (ref 70–99)

## 2023-05-03 PROCEDURE — 99233 SBSQ HOSP IP/OBS HIGH 50: CPT

## 2023-05-03 RX ORDER — ATORVASTATIN CALCIUM 80 MG/1
80 TABLET, FILM COATED ORAL AT BEDTIME
Refills: 0 | Status: DISCONTINUED | OUTPATIENT
Start: 2023-05-03 | End: 2023-05-03

## 2023-05-03 RX ORDER — CARVEDILOL PHOSPHATE 80 MG/1
25 CAPSULE, EXTENDED RELEASE ORAL EVERY 12 HOURS
Refills: 0 | Status: DISCONTINUED | OUTPATIENT
Start: 2023-05-03 | End: 2023-05-05

## 2023-05-03 RX ORDER — SODIUM CHLORIDE 9 MG/ML
1000 INJECTION, SOLUTION INTRAVENOUS
Refills: 0 | Status: DISCONTINUED | OUTPATIENT
Start: 2023-05-03 | End: 2023-05-05

## 2023-05-03 RX ORDER — TRAZODONE HCL 50 MG
100 TABLET ORAL AT BEDTIME
Refills: 0 | Status: DISCONTINUED | OUTPATIENT
Start: 2023-05-03 | End: 2023-05-05

## 2023-05-03 RX ORDER — FUROSEMIDE 40 MG
40 TABLET ORAL DAILY
Refills: 0 | Status: DISCONTINUED | OUTPATIENT
Start: 2023-05-03 | End: 2023-05-05

## 2023-05-03 RX ORDER — DEXTROSE 50 % IN WATER 50 %
12.5 SYRINGE (ML) INTRAVENOUS ONCE
Refills: 0 | Status: DISCONTINUED | OUTPATIENT
Start: 2023-05-03 | End: 2023-05-05

## 2023-05-03 RX ORDER — INSULIN LISPRO 100/ML
VIAL (ML) SUBCUTANEOUS
Refills: 0 | Status: DISCONTINUED | OUTPATIENT
Start: 2023-05-03 | End: 2023-05-05

## 2023-05-03 RX ORDER — TRAZODONE HCL 50 MG
100 TABLET ORAL ONCE
Refills: 0 | Status: COMPLETED | OUTPATIENT
Start: 2023-05-03 | End: 2023-05-03

## 2023-05-03 RX ORDER — INSULIN LISPRO 100/ML
VIAL (ML) SUBCUTANEOUS AT BEDTIME
Refills: 0 | Status: DISCONTINUED | OUTPATIENT
Start: 2023-05-03 | End: 2023-05-05

## 2023-05-03 RX ORDER — DEXTROSE 50 % IN WATER 50 %
25 SYRINGE (ML) INTRAVENOUS ONCE
Refills: 0 | Status: DISCONTINUED | OUTPATIENT
Start: 2023-05-03 | End: 2023-05-05

## 2023-05-03 RX ORDER — GLUCAGON INJECTION, SOLUTION 0.5 MG/.1ML
1 INJECTION, SOLUTION SUBCUTANEOUS ONCE
Refills: 0 | Status: DISCONTINUED | OUTPATIENT
Start: 2023-05-03 | End: 2023-05-05

## 2023-05-03 RX ORDER — DEXTROSE 50 % IN WATER 50 %
15 SYRINGE (ML) INTRAVENOUS ONCE
Refills: 0 | Status: DISCONTINUED | OUTPATIENT
Start: 2023-05-03 | End: 2023-05-05

## 2023-05-03 RX ADMIN — Medication 500 MILLIGRAM(S): at 10:58

## 2023-05-03 RX ADMIN — ATORVASTATIN CALCIUM 80 MILLIGRAM(S): 80 TABLET, FILM COATED ORAL at 21:06

## 2023-05-03 RX ADMIN — SENNA PLUS 2 TABLET(S): 8.6 TABLET ORAL at 21:06

## 2023-05-03 RX ADMIN — Medication 500 MILLIGRAM(S): at 05:21

## 2023-05-03 RX ADMIN — Medication 500 MILLIGRAM(S): at 06:25

## 2023-05-03 RX ADMIN — CARVEDILOL PHOSPHATE 25 MILLIGRAM(S): 80 CAPSULE, EXTENDED RELEASE ORAL at 17:11

## 2023-05-03 RX ADMIN — Medication 81 MILLIGRAM(S): at 10:59

## 2023-05-03 RX ADMIN — Medication 500 MILLIGRAM(S): at 12:07

## 2023-05-03 RX ADMIN — Medication 500 MILLIGRAM(S): at 23:43

## 2023-05-03 RX ADMIN — Medication 100 MILLIGRAM(S): at 01:42

## 2023-05-03 RX ADMIN — Medication 100 MILLIGRAM(S): at 21:06

## 2023-05-03 RX ADMIN — POLYETHYLENE GLYCOL 3350 17 GRAM(S): 17 POWDER, FOR SOLUTION ORAL at 10:59

## 2023-05-03 RX ADMIN — Medication 40 MILLIGRAM(S): at 05:21

## 2023-05-03 RX ADMIN — Medication 500 MILLIGRAM(S): at 17:11

## 2023-05-03 RX ADMIN — OXYCODONE HYDROCHLORIDE 5 MILLIGRAM(S): 5 TABLET ORAL at 21:06

## 2023-05-03 RX ADMIN — CARVEDILOL PHOSPHATE 25 MILLIGRAM(S): 80 CAPSULE, EXTENDED RELEASE ORAL at 05:21

## 2023-05-03 RX ADMIN — ENOXAPARIN SODIUM 40 MILLIGRAM(S): 100 INJECTION SUBCUTANEOUS at 05:27

## 2023-05-03 RX ADMIN — OXYCODONE HYDROCHLORIDE 5 MILLIGRAM(S): 5 TABLET ORAL at 22:00

## 2023-05-03 RX ADMIN — LATANOPROST 1 DROP(S): 0.05 SOLUTION/ DROPS OPHTHALMIC; TOPICAL at 21:09

## 2023-05-03 NOTE — PROGRESS NOTE ADULT - SUBJECTIVE AND OBJECTIVE BOX
Rancho Urban MD  Division of Hospital Medicine  Available on MS teams until 7pm  If no response or off-hours, page [phone]  -------------------------------------    Patient is a 73y old  Male who presents with a chief complaint of L intertrochanteric fracture (03 May 2023 06:52)    SUBJECTIVE / OVERNIGHT EVENTS: none acute  ADDITIONAL REVIEW OF SYSTEMS: pt less agitated today than yesterday- Will  deferred in favor of translation by son Mark at bedside. Pt notes some ongoing LLE pain from fracture/surgery site. Otherwise no CP/palpitations, sob/cough, no fevers/chills. Son is reluctant to discontinue the bedside aid but understands we need to ensure patient can be calm and cooperative without bedside aid in order to transition patient to rehab.     MEDICATIONS  (STANDING):  acetaminophen     Tablet .. 500 milliGRAM(s) Oral every 6 hours  aspirin  chewable 81 milliGRAM(s) Oral daily  atorvastatin 80 milliGRAM(s) Oral at bedtime  carvedilol 25 milliGRAM(s) Oral every 12 hours  enoxaparin Injectable 40 milliGRAM(s) SubCutaneous every 24 hours  furosemide    Tablet 40 milliGRAM(s) Oral daily  latanoprost 0.005% Ophthalmic Solution 1 Drop(s) Both EYES at bedtime  polyethylene glycol 3350 17 Gram(s) Oral daily  senna 2 Tablet(s) Oral at bedtime  traZODone 100 milliGRAM(s) Oral at bedtime    MEDICATIONS  (PRN):  bisacodyl Suppository 10 milliGRAM(s) Rectal daily PRN If no bowel movement  LORazepam   Injectable 2 milliGRAM(s) IV Push every 2 hours PRN Symptom-triggered: 2 point increase in CIWA -Ar score and a total score of 7 or LESS  magnesium hydroxide Suspension 30 milliLiter(s) Oral daily PRN Constipation  oxyCODONE    IR 5 milliGRAM(s) Oral every 4 hours PRN Moderate Pain (4 - 6)  oxyCODONE    IR 10 milliGRAM(s) Oral every 4 hours PRN Severe Pain (7 - 10)  traMADol 25 milliGRAM(s) Oral every 6 hours PRN Mild Pain (1 - 3)      CAPILLARY BLOOD GLUCOSE        I&O's Summary    02 May 2023 07:01  -  03 May 2023 07:00  --------------------------------------------------------  IN: 820 mL / OUT: 0 mL / NET: 820 mL    03 May 2023 07:01  -  03 May 2023 14:52  --------------------------------------------------------  IN: 480 mL / OUT: 0 mL / NET: 480 mL        PHYSICAL EXAM:  Vital Signs Last 24 Hrs  T(C): 37 (03 May 2023 12:03), Max: 37 (03 May 2023 12:03)  T(F): 98.6 (03 May 2023 12:03), Max: 98.6 (03 May 2023 12:03)  HR: 65 (03 May 2023 12:03) (65 - 85)  BP: 152/81 (03 May 2023 12:03) (146/82 - 154/67)  BP(mean): --  RR: 18 (03 May 2023 12:03) (17 - 18)  SpO2: 98% (03 May 2023 12:03) (97% - 99%)    Parameters below as of 03 May 2023 12:03  Patient On (Oxygen Delivery Method): room air      CONSTITUTIONAL: NAD  EYES: PERRLA; conjunctiva and sclera clear  ENMT: MMM  NECK: Supple  RESPIRATORY: Normal respiratory effort; CTAB  CARDIOVASCULAR: RRR, no JVD, no peripheral edema   ABDOMEN: Nontender to palpation, normoactive BS, no guarding/rigidity  MUSCLOSKELETAL:  no clubbing/cyanosis, no joint swelling or tenderness to palpation  PSYCH: A+O x 3, affect normal  NEUROLOGY: CN 2-12 are intact and symmetric; no gross sensory or motor deficits  SKIN: No rashes; no palpable lesions    LABS:                      RADIOLOGY & ADDITIONAL TESTS:  Results Reviewed:   Imaging Personally Reviewed:  Electrocardiogram Personally Reviewed:    COORDINATION OF CARE:  Care Discussed with Consultants/Other Providers [Y/N]:  Prior or Outpatient Records Reviewed [Y/N]:

## 2023-05-03 NOTE — PROGRESS NOTE ADULT - SUBJECTIVE AND OBJECTIVE BOX
POD #4 s/p fem IMN    SUBJECTIVE: Pt seen and examined at bedside. As per bedside RNs, patient is disoriented and agitated, but otherwise no acute overnight events. Patient is on ativan taper.        Physical Examination:  GEN: NAD, resting quietly  PULM: symmetric chest rise bilaterally, no increased WOB  ABD: nondistended  EXTR:   Left Lower extremity:          Dressing: clean/dry/intact            Sensation: SILT         Motor exam: 5/5 TA/GS/EHL/FHL         warm well perfused          compartments soft        +DP pulse        capillary refill <3 seconds           Assessment and Plan:   · Assessment	  NAOMI MORRISSEY is a 73y Male sp L hip IMN on 4/29. Recovering appropriately from orthopedic standpoint    PLAN:  - Multimodal pain control  - WBAT  - DVT: Lovenox  - Resume home meds  - PT/OT  - Care per primary team  - Dispo: [facility]ON[facility] per PT eval    Orthopaedic Surgery  [employer] [facility] [employer] [id number]  [email or web address]        [id number]  [facility]  [id number]/ [phone].

## 2023-05-04 LAB
GLUCOSE BLDC GLUCOMTR-MCNC: 116 MG/DL — HIGH (ref 70–99)
GLUCOSE BLDC GLUCOMTR-MCNC: 118 MG/DL — HIGH (ref 70–99)
GLUCOSE BLDC GLUCOMTR-MCNC: 124 MG/DL — HIGH (ref 70–99)
GLUCOSE BLDC GLUCOMTR-MCNC: 166 MG/DL — HIGH (ref 70–99)
SARS-COV-2 RNA SPEC QL NAA+PROBE: SIGNIFICANT CHANGE UP

## 2023-05-04 PROCEDURE — 99233 SBSQ HOSP IP/OBS HIGH 50: CPT

## 2023-05-04 RX ORDER — GABAPENTIN 400 MG/1
300 CAPSULE ORAL
Refills: 0 | Status: DISCONTINUED | OUTPATIENT
Start: 2023-05-04 | End: 2023-05-05

## 2023-05-04 RX ADMIN — Medication 500 MILLIGRAM(S): at 05:41

## 2023-05-04 RX ADMIN — LATANOPROST 1 DROP(S): 0.05 SOLUTION/ DROPS OPHTHALMIC; TOPICAL at 21:05

## 2023-05-04 RX ADMIN — OXYCODONE HYDROCHLORIDE 5 MILLIGRAM(S): 5 TABLET ORAL at 12:47

## 2023-05-04 RX ADMIN — Medication 500 MILLIGRAM(S): at 10:05

## 2023-05-04 RX ADMIN — Medication 500 MILLIGRAM(S): at 17:44

## 2023-05-04 RX ADMIN — Medication 500 MILLIGRAM(S): at 11:25

## 2023-05-04 RX ADMIN — ATORVASTATIN CALCIUM 80 MILLIGRAM(S): 80 TABLET, FILM COATED ORAL at 21:03

## 2023-05-04 RX ADMIN — Medication 500 MILLIGRAM(S): at 23:57

## 2023-05-04 RX ADMIN — OXYCODONE HYDROCHLORIDE 5 MILLIGRAM(S): 5 TABLET ORAL at 13:23

## 2023-05-04 RX ADMIN — Medication 500 MILLIGRAM(S): at 17:14

## 2023-05-04 RX ADMIN — ENOXAPARIN SODIUM 40 MILLIGRAM(S): 100 INJECTION SUBCUTANEOUS at 05:41

## 2023-05-04 RX ADMIN — POLYETHYLENE GLYCOL 3350 17 GRAM(S): 17 POWDER, FOR SOLUTION ORAL at 10:05

## 2023-05-04 RX ADMIN — Medication 500 MILLIGRAM(S): at 06:41

## 2023-05-04 RX ADMIN — Medication 1: at 09:33

## 2023-05-04 RX ADMIN — Medication 100 MILLIGRAM(S): at 21:03

## 2023-05-04 RX ADMIN — CARVEDILOL PHOSPHATE 25 MILLIGRAM(S): 80 CAPSULE, EXTENDED RELEASE ORAL at 17:14

## 2023-05-04 RX ADMIN — GABAPENTIN 300 MILLIGRAM(S): 400 CAPSULE ORAL at 21:03

## 2023-05-04 RX ADMIN — CARVEDILOL PHOSPHATE 25 MILLIGRAM(S): 80 CAPSULE, EXTENDED RELEASE ORAL at 05:41

## 2023-05-04 RX ADMIN — Medication 81 MILLIGRAM(S): at 10:05

## 2023-05-04 RX ADMIN — SENNA PLUS 2 TABLET(S): 8.6 TABLET ORAL at 21:02

## 2023-05-04 RX ADMIN — Medication 40 MILLIGRAM(S): at 05:41

## 2023-05-04 NOTE — PROGRESS NOTE ADULT - NSPROGADDITIONALINFOA_GEN_ALL_CORE
The necessity of the time spent during the encounter on this date of service was due to:   - Ordering, reviewing, and interpreting labs, testing, and imaging.  - Independently obtaining a review of systems and performing a physical exam  - Reviewing prior hospitalization and where necessary, outpatient records.  - Counselling and educating patient and family regarding interpretation of aforementioned items and plan of care.    Time-based billing (NON-critical care). Total minutes spent: 55
The necessity of the time spent during the encounter on this date of service was due to:   - Ordering, reviewing, and interpreting labs, testing, and imaging.  - Independently obtaining a review of systems and performing a physical exam  - Reviewing prior hospitalization and where necessary, outpatient records.  - Counselling and educating patient and family regarding interpretation of aforementioned items and plan of care.    Time-based billing (NON-critical care). Total minutes spent: 55

## 2023-05-04 NOTE — PROGRESS NOTE ADULT - SUBJECTIVE AND OBJECTIVE BOX
Rancho Urban MD  Division of Hospital Medicine  Available on MS teams until 7pm  If no response or off-hours, page [phone]  -------------------------------------    Patient is a 73y old  Male who presents with a chief complaint of L intertrochanteric fracture (04 May 2023 12:32)    SUBJECTIVE / OVERNIGHT EVENTS: none acute  ADDITIONAL REVIEW OF SYSTEMS: no agitation overnight, pt back to mental baselien per son, but notices uncontrolled LLE pain. Tylenol not enough. Son concerned about recurrent delirium.     MEDICATIONS  (STANDING):  acetaminophen     Tablet .. 500 milliGRAM(s) Oral every 6 hours  aspirin  chewable 81 milliGRAM(s) Oral daily  atorvastatin 80 milliGRAM(s) Oral at bedtime  carvedilol 25 milliGRAM(s) Oral every 12 hours  dextrose 5%. 1000 milliLiter(s) (50 mL/Hr) IV Continuous <Continuous>  dextrose 5%. 1000 milliLiter(s) (100 mL/Hr) IV Continuous <Continuous>  dextrose 50% Injectable 25 Gram(s) IV Push once  dextrose 50% Injectable 12.5 Gram(s) IV Push once  dextrose 50% Injectable 25 Gram(s) IV Push once  enoxaparin Injectable 40 milliGRAM(s) SubCutaneous every 24 hours  furosemide    Tablet 40 milliGRAM(s) Oral daily  glucagon  Injectable 1 milliGRAM(s) IntraMuscular once  insulin lispro (ADMELOG) corrective regimen sliding scale   SubCutaneous three times a day before meals  insulin lispro (ADMELOG) corrective regimen sliding scale   SubCutaneous at bedtime  latanoprost 0.005% Ophthalmic Solution 1 Drop(s) Both EYES at bedtime  polyethylene glycol 3350 17 Gram(s) Oral daily  senna 2 Tablet(s) Oral at bedtime  traZODone 100 milliGRAM(s) Oral at bedtime    MEDICATIONS  (PRN):  bisacodyl Suppository 10 milliGRAM(s) Rectal daily PRN If no bowel movement  dextrose Oral Gel 15 Gram(s) Oral once PRN Blood Glucose LESS THAN 70 milliGRAM(s)/deciliter  LORazepam   Injectable 2 milliGRAM(s) IV Push every 2 hours PRN Symptom-triggered: 2 point increase in CIWA -Ar score and a total score of 7 or LESS  magnesium hydroxide Suspension 30 milliLiter(s) Oral daily PRN Constipation  oxyCODONE    IR 5 milliGRAM(s) Oral every 4 hours PRN Moderate Pain (4 - 6)      CAPILLARY BLOOD GLUCOSE      POCT Blood Glucose.: 116 mg/dL (04 May 2023 12:20)  POCT Blood Glucose.: 166 mg/dL (04 May 2023 08:48)  POCT Blood Glucose.: 120 mg/dL (03 May 2023 22:00)  POCT Blood Glucose.: 109 mg/dL (03 May 2023 17:18)    I&O's Summary    03 May 2023 07:01  -  04 May 2023 07:00  --------------------------------------------------------  IN: 1010 mL / OUT: 0 mL / NET: 1010 mL    04 May 2023 07:01  -  04 May 2023 12:41  --------------------------------------------------------  IN: 240 mL / OUT: 0 mL / NET: 240 mL        PHYSICAL EXAM:  Vital Signs Last 24 Hrs  T(C): 36.8 (04 May 2023 11:42), Max: 36.9 (04 May 2023 05:17)  T(F): 98.2 (04 May 2023 11:42), Max: 98.4 (04 May 2023 05:17)  HR: 76 (04 May 2023 11:42) (73 - 76)  BP: 131/69 (04 May 2023 11:42) (125/70 - 131/69)  BP(mean): --  RR: 18 (04 May 2023 11:42) (18 - 18)  SpO2: 98% (04 May 2023 11:42) (95% - 98%)    Parameters below as of 04 May 2023 11:42  Patient On (Oxygen Delivery Method): room air      CONSTITUTIONAL: NAD  EYES: PERRLA; conjunctiva and sclera clear  ENMT: MMM  NECK: Supple  RESPIRATORY: Normal respiratory effort; CTAB  CARDIOVASCULAR: RRR, no JVD, no peripheral edema   ABDOMEN: Nontender to palpation, normoactive BS, no guarding/rigidity  MUSCLOSKELETAL:  no clubbing/cyanosis, no joint swelling or tenderness to palpation  PSYCH: A+O x 3, affect normal  NEUROLOGY: CN 2-12 are intact and symmetric; no gross sensory or motor deficits  SKIN: No rashes; no palpable lesions    LABS:                      RADIOLOGY & ADDITIONAL TESTS:  Results Reviewed:   Imaging Personally Reviewed:  Electrocardiogram Personally Reviewed:    COORDINATION OF CARE:  Care Discussed with Consultants/Other Providers [Y/N]:  Prior or Outpatient Records Reviewed [Y/N]:

## 2023-05-04 NOTE — PROGRESS NOTE ADULT - SUBJECTIVE AND OBJECTIVE BOX
Rancho Urban MD  Division of Hospital Medicine  Available on MS teams until 7pm  If no response or off-hours, page [phone]  -------------------------------------    Patient is a 73y old  Male who presents with a chief complaint of L intertrochanteric fracture (04 May 2023 06:48)    SUBJECTIVE / OVERNIGHT EVENTS:   ADDITIONAL REVIEW OF SYSTEMS:    MEDICATIONS  (STANDING):  acetaminophen     Tablet .. 500 milliGRAM(s) Oral every 6 hours  aspirin  chewable 81 milliGRAM(s) Oral daily  atorvastatin 80 milliGRAM(s) Oral at bedtime  carvedilol 25 milliGRAM(s) Oral every 12 hours  dextrose 5%. 1000 milliLiter(s) (50 mL/Hr) IV Continuous <Continuous>  dextrose 5%. 1000 milliLiter(s) (100 mL/Hr) IV Continuous <Continuous>  dextrose 50% Injectable 25 Gram(s) IV Push once  dextrose 50% Injectable 12.5 Gram(s) IV Push once  dextrose 50% Injectable 25 Gram(s) IV Push once  enoxaparin Injectable 40 milliGRAM(s) SubCutaneous every 24 hours  furosemide    Tablet 40 milliGRAM(s) Oral daily  glucagon  Injectable 1 milliGRAM(s) IntraMuscular once  insulin lispro (ADMELOG) corrective regimen sliding scale   SubCutaneous three times a day before meals  insulin lispro (ADMELOG) corrective regimen sliding scale   SubCutaneous at bedtime  latanoprost 0.005% Ophthalmic Solution 1 Drop(s) Both EYES at bedtime  polyethylene glycol 3350 17 Gram(s) Oral daily  senna 2 Tablet(s) Oral at bedtime  traZODone 100 milliGRAM(s) Oral at bedtime    MEDICATIONS  (PRN):  bisacodyl Suppository 10 milliGRAM(s) Rectal daily PRN If no bowel movement  dextrose Oral Gel 15 Gram(s) Oral once PRN Blood Glucose LESS THAN 70 milliGRAM(s)/deciliter  LORazepam   Injectable 2 milliGRAM(s) IV Push every 2 hours PRN Symptom-triggered: 2 point increase in CIWA -Ar score and a total score of 7 or LESS  magnesium hydroxide Suspension 30 milliLiter(s) Oral daily PRN Constipation  oxyCODONE    IR 5 milliGRAM(s) Oral every 4 hours PRN Moderate Pain (4 - 6)      CAPILLARY BLOOD GLUCOSE      POCT Blood Glucose.: 116 mg/dL (04 May 2023 12:20)  POCT Blood Glucose.: 166 mg/dL (04 May 2023 08:48)  POCT Blood Glucose.: 120 mg/dL (03 May 2023 22:00)  POCT Blood Glucose.: 109 mg/dL (03 May 2023 17:18)    I&O's Summary    03 May 2023 07:01  -  04 May 2023 07:00  --------------------------------------------------------  IN: 1010 mL / OUT: 0 mL / NET: 1010 mL    04 May 2023 07:01  -  04 May 2023 12:32  --------------------------------------------------------  IN: 240 mL / OUT: 0 mL / NET: 240 mL        PHYSICAL EXAM:  Vital Signs Last 24 Hrs  T(C): 36.8 (04 May 2023 11:42), Max: 36.9 (04 May 2023 05:17)  T(F): 98.2 (04 May 2023 11:42), Max: 98.4 (04 May 2023 05:17)  HR: 76 (04 May 2023 11:42) (73 - 76)  BP: 131/69 (04 May 2023 11:42) (125/70 - 131/69)  BP(mean): --  RR: 18 (04 May 2023 11:42) (18 - 18)  SpO2: 98% (04 May 2023 11:42) (95% - 98%)    Parameters below as of 04 May 2023 11:42  Patient On (Oxygen Delivery Method): room air      CONSTITUTIONAL: NAD  EYES: PERRLA; conjunctiva and sclera clear  ENMT: MMM  NECK: Supple  RESPIRATORY: Normal respiratory effort; CTAB  CARDIOVASCULAR: RRR, no JVD, no peripheral edema   ABDOMEN: Nontender to palpation, normoactive BS, no guarding/rigidity  MUSCLOSKELETAL:  no clubbing/cyanosis, no joint swelling or tenderness to palpation  PSYCH: A+O x 3, affect normal  NEUROLOGY: CN 2-12 are intact and symmetric; no gross sensory or motor deficits  SKIN: No rashes; no palpable lesions    LABS:                      RADIOLOGY & ADDITIONAL TESTS:  Results Reviewed:   Imaging Personally Reviewed:  Electrocardiogram Personally Reviewed:    COORDINATION OF CARE:  Care Discussed with Consultants/Other Providers [Y/N]:  Prior or Outpatient Records Reviewed [Y/N]:

## 2023-05-04 NOTE — PROGRESS NOTE ADULT - SUBJECTIVE AND OBJECTIVE BOX
POD # 5 s/p fem IMN    SUBJECTIVE: Pt seen and examined at bedside. Patient's mental status mildly improved, complaining of incisional pain controlled with medication.      OBJECTIVE:  Vital Signs Last 24 Hrs  T(C): 36.9 (04 May 2023 05:17), Max: 37 (03 May 2023 12:03)  T(F): 98.4 (04 May 2023 05:17), Max: 98.6 (03 May 2023 12:03)  HR: 73 (04 May 2023 05:17) (65 - 75)  BP: 125/70 (04 May 2023 05:17) (125/70 - 152/81)  BP(mean): --  RR: 18 (04 May 2023 05:17) (18 - 18)  SpO2: 98% (04 May 2023 05:17) (95% - 98%)    Parameters below as of 04 May 2023 05:17  Patient On (Oxygen Delivery Method): room air          05-02-23 @ 07:01  -  05-03-23 @ 07:00  --------------------------------------------------------  IN: 820 mL / OUT: 0 mL / NET: 820 mL    05-03-23 @ 07:01  -  05-04-23 @ 06:48  --------------------------------------------------------  IN: 1010 mL / OUT: 0 mL / NET: 1010 mL        Physical Examination:  GEN: NAD, resting quietly  PULM: symmetric chest rise bilaterally, no increased WOB  ABD: nondistended  EXTR:   Left Lower extremity:          Dressing: clean/dry/intact            Sensation: SILT         Motor exam: 5/5 TA/GS/EHL/FHL         warm well perfused          compartments soft        +DP pulse        capillary refill <3 seconds         LABS:

## 2023-05-05 ENCOUNTER — TRANSCRIPTION ENCOUNTER (OUTPATIENT)
Age: 73
End: 2023-05-05

## 2023-05-05 VITALS
DIASTOLIC BLOOD PRESSURE: 69 MMHG | HEART RATE: 85 BPM | SYSTOLIC BLOOD PRESSURE: 116 MMHG | TEMPERATURE: 98 F | OXYGEN SATURATION: 98 % | RESPIRATION RATE: 18 BRPM

## 2023-05-05 LAB — GLUCOSE BLDC GLUCOMTR-MCNC: 125 MG/DL — HIGH (ref 70–99)

## 2023-05-05 PROCEDURE — 80053 COMPREHEN METABOLIC PANEL: CPT

## 2023-05-05 PROCEDURE — C1713: CPT

## 2023-05-05 PROCEDURE — 85730 THROMBOPLASTIN TIME PARTIAL: CPT

## 2023-05-05 PROCEDURE — 36415 COLL VENOUS BLD VENIPUNCTURE: CPT

## 2023-05-05 PROCEDURE — U0003: CPT

## 2023-05-05 PROCEDURE — 85610 PROTHROMBIN TIME: CPT

## 2023-05-05 PROCEDURE — 86900 BLOOD TYPING SEROLOGIC ABO: CPT

## 2023-05-05 PROCEDURE — C1769: CPT

## 2023-05-05 PROCEDURE — C9399: CPT

## 2023-05-05 PROCEDURE — 99239 HOSP IP/OBS DSCHRG MGMT >30: CPT

## 2023-05-05 PROCEDURE — 99285 EMERGENCY DEPT VISIT HI MDM: CPT | Mod: 25

## 2023-05-05 PROCEDURE — 73502 X-RAY EXAM HIP UNI 2-3 VIEWS: CPT

## 2023-05-05 PROCEDURE — 73560 X-RAY EXAM OF KNEE 1 OR 2: CPT

## 2023-05-05 PROCEDURE — 80061 LIPID PANEL: CPT

## 2023-05-05 PROCEDURE — 86803 HEPATITIS C AB TEST: CPT

## 2023-05-05 PROCEDURE — 85027 COMPLETE CBC AUTOMATED: CPT

## 2023-05-05 PROCEDURE — 96375 TX/PRO/DX INJ NEW DRUG ADDON: CPT

## 2023-05-05 PROCEDURE — 96374 THER/PROPH/DIAG INJ IV PUSH: CPT

## 2023-05-05 PROCEDURE — 80048 BASIC METABOLIC PNL TOTAL CA: CPT

## 2023-05-05 PROCEDURE — 82962 GLUCOSE BLOOD TEST: CPT

## 2023-05-05 PROCEDURE — 86850 RBC ANTIBODY SCREEN: CPT

## 2023-05-05 PROCEDURE — 97161 PT EVAL LOW COMPLEX 20 MIN: CPT

## 2023-05-05 PROCEDURE — 71045 X-RAY EXAM CHEST 1 VIEW: CPT

## 2023-05-05 PROCEDURE — 85025 COMPLETE CBC W/AUTO DIFF WBC: CPT

## 2023-05-05 PROCEDURE — 97530 THERAPEUTIC ACTIVITIES: CPT

## 2023-05-05 PROCEDURE — 80307 DRUG TEST PRSMV CHEM ANLYZR: CPT

## 2023-05-05 PROCEDURE — U0005: CPT

## 2023-05-05 PROCEDURE — 97116 GAIT TRAINING THERAPY: CPT

## 2023-05-05 PROCEDURE — 83036 HEMOGLOBIN GLYCOSYLATED A1C: CPT

## 2023-05-05 PROCEDURE — 97165 OT EVAL LOW COMPLEX 30 MIN: CPT

## 2023-05-05 PROCEDURE — 81001 URINALYSIS AUTO W/SCOPE: CPT

## 2023-05-05 PROCEDURE — 86901 BLOOD TYPING SEROLOGIC RH(D): CPT

## 2023-05-05 PROCEDURE — 76000 FLUOROSCOPY <1 HR PHYS/QHP: CPT

## 2023-05-05 PROCEDURE — 73552 X-RAY EXAM OF FEMUR 2/>: CPT

## 2023-05-05 RX ORDER — ATORVASTATIN CALCIUM 80 MG/1
1 TABLET, FILM COATED ORAL
Qty: 0 | Refills: 0 | DISCHARGE
Start: 2023-05-05

## 2023-05-05 RX ORDER — ACETAMINOPHEN 500 MG
1 TABLET ORAL
Qty: 0 | Refills: 0 | DISCHARGE
Start: 2023-05-05

## 2023-05-05 RX ORDER — LATANOPROST 0.05 MG/ML
1 SOLUTION/ DROPS OPHTHALMIC; TOPICAL
Qty: 0 | Refills: 0 | DISCHARGE
Start: 2023-05-05

## 2023-05-05 RX ORDER — TRAZODONE HCL 50 MG
1 TABLET ORAL
Qty: 0 | Refills: 0 | DISCHARGE
Start: 2023-05-05

## 2023-05-05 RX ORDER — TRAMADOL HYDROCHLORIDE 50 MG/1
0.5 TABLET ORAL
Qty: 0 | Refills: 0 | DISCHARGE
Start: 2023-05-05

## 2023-05-05 RX ORDER — TRAMADOL HYDROCHLORIDE 50 MG/1
25 TABLET ORAL EVERY 6 HOURS
Refills: 0 | Status: DISCONTINUED | OUTPATIENT
Start: 2023-05-05 | End: 2023-05-05

## 2023-05-05 RX ORDER — OXYCODONE HYDROCHLORIDE 5 MG/1
1 TABLET ORAL
Qty: 0 | Refills: 0 | DISCHARGE
Start: 2023-05-05

## 2023-05-05 RX ORDER — POLYETHYLENE GLYCOL 3350 17 G/17G
17 POWDER, FOR SOLUTION ORAL
Qty: 0 | Refills: 0 | DISCHARGE
Start: 2023-05-05

## 2023-05-05 RX ORDER — CARVEDILOL PHOSPHATE 80 MG/1
1 CAPSULE, EXTENDED RELEASE ORAL
Qty: 0 | Refills: 0 | DISCHARGE
Start: 2023-05-05

## 2023-05-05 RX ORDER — SENNA PLUS 8.6 MG/1
2 TABLET ORAL
Qty: 0 | Refills: 0 | DISCHARGE
Start: 2023-05-05

## 2023-05-05 RX ORDER — FUROSEMIDE 40 MG
1 TABLET ORAL
Qty: 0 | Refills: 0 | DISCHARGE
Start: 2023-05-05

## 2023-05-05 RX ORDER — GABAPENTIN 400 MG/1
1 CAPSULE ORAL
Qty: 0 | Refills: 0 | DISCHARGE
Start: 2023-05-05

## 2023-05-05 RX ORDER — ASPIRIN/CALCIUM CARB/MAGNESIUM 324 MG
1 TABLET ORAL
Qty: 0 | Refills: 0 | DISCHARGE
Start: 2023-05-05

## 2023-05-05 RX ADMIN — Medication 40 MILLIGRAM(S): at 06:55

## 2023-05-05 RX ADMIN — Medication 500 MILLIGRAM(S): at 06:54

## 2023-05-05 RX ADMIN — TRAMADOL HYDROCHLORIDE 25 MILLIGRAM(S): 50 TABLET ORAL at 11:26

## 2023-05-05 RX ADMIN — Medication 500 MILLIGRAM(S): at 11:26

## 2023-05-05 RX ADMIN — CARVEDILOL PHOSPHATE 25 MILLIGRAM(S): 80 CAPSULE, EXTENDED RELEASE ORAL at 06:55

## 2023-05-05 RX ADMIN — GABAPENTIN 300 MILLIGRAM(S): 400 CAPSULE ORAL at 06:55

## 2023-05-05 RX ADMIN — Medication 500 MILLIGRAM(S): at 08:16

## 2023-05-05 RX ADMIN — POLYETHYLENE GLYCOL 3350 17 GRAM(S): 17 POWDER, FOR SOLUTION ORAL at 11:26

## 2023-05-05 RX ADMIN — OXYCODONE HYDROCHLORIDE 5 MILLIGRAM(S): 5 TABLET ORAL at 10:13

## 2023-05-05 RX ADMIN — Medication 500 MILLIGRAM(S): at 00:57

## 2023-05-05 RX ADMIN — ENOXAPARIN SODIUM 40 MILLIGRAM(S): 100 INJECTION SUBCUTANEOUS at 06:55

## 2023-05-05 RX ADMIN — Medication 81 MILLIGRAM(S): at 11:27

## 2023-05-05 NOTE — DISCHARGE NOTE NURSING/CASE MANAGEMENT/SOCIAL WORK - NSDCPEFALRISK_GEN_ALL_CORE
For information on Fall & Injury Prevention, visit: [email or web address] OR  [email or web address] OR  https://www.cdc.gov/steadi/patient.html

## 2023-05-05 NOTE — PROGRESS NOTE ADULT - PROBLEM SELECTOR PROBLEM 1
Intertrochanteric fracture of left hip

## 2023-05-05 NOTE — PROGRESS NOTE ADULT - PROVIDER SPECIALTY LIST ADULT
Internal Medicine
Orthopedics
Hospitalist
Internal Medicine
Hospitalist
Orthopedics
Hospitalist

## 2023-05-05 NOTE — PROGRESS NOTE ADULT - PROBLEM SELECTOR PLAN 7
-s/p CABG  -c/w ASA, statin
-s/p CICU stay in 2014 where he was found to be a candidate for biV ICD w/ recent lead replacement in January 2023 w/ Dr. Ivey  -continue current home meds: coreg, losartan, lasix  -appears euvolemic

## 2023-05-05 NOTE — PROGRESS NOTE ADULT - PROBLEM SELECTOR PLAN 1
-s/p mechanical fall on 4/27 w/ displaced intertrochanteric fracture  -analgesia with oxy 5mg for moderate pain and 10mg for severe pain for now. Concurrent bowel regimen of senna and miralax ordered  -s/p left femur IMN on 4/29  -PT consult  -Has Romero so TOV soon
-s/p mechanical fall on 4/27 w/ displaced intertrochanteric fracture  -analgesia with oxy 5mg for moderate pain and 10mg for severe pain for now. Concurrent bowel regimen of senna and miralax ordered  -s/p left femur IMN on 4/29  -PT consult  - isaac removed
-s/p mechanical fall on 4/27 w/ displaced intertrochanteric fracture  -analgesia with oxy 5mg for moderate pain and 10mg for severe pain for now. Concurrent bowel regimen of senna and miralax ordered  -s/p left femur IMN on 4/29  -PT consult- rec ZONIA  - isaac removed
-s/p mechanical fall on 4/27 w/ displaced intertrochanteric fracture  -analgesia with oxy 5mg for severe pain, tylenol- pt received dose 5/3 without agitation/delirium, ok to continue for now  -s/p left femur IMN on 4/29  -PT consult- rec ZONIA  - isaac removed
-s/p mechanical fall on 4/27 w/ displaced intertrochanteric fracture  -analgesia with oxy 5mg for moderate pain and 10mg for severe pain for now. Concurrent bowel regimen of senna and miralax ordered  -s/p left femur IMN on 4/29  -PT consult- rec ZONIA  - isaac removed
-s/p mechanical fall on 4/27 w/ displaced intertrochanteric fracture, seen by ortho, planned for L hip IMN tomorrow 4/29. NPO after MN, AM labs ordered, holding DVT ppx after MN  -analgesia with oxy 5mg for moderate pain and 10mg for severe pain for now. Concurrent bowel regimen of senna and miralax ordered  -will need PT after OR  -given high mortality risk of intertrochanteric fracture and that patient is hemodynamically stable, he is currently medically optimized for the OR  -TOV after surgery
-s/p mechanical fall on 4/27 w/ displaced intertrochanteric fracture  -would switch from oxy to tramadol 25mg po q6 hours prn severe pain, titrate further based on response  -s/p left femur IMN on 4/29  -PT consult- rec ZONIA  - isaac removed

## 2023-05-05 NOTE — PROGRESS NOTE ADULT - REASON FOR ADMISSION
L intertrochanteric fracture

## 2023-05-05 NOTE — PROGRESS NOTE ADULT - PROBLEM SELECTOR PROBLEM 2
Alcohol dependence with withdrawal

## 2023-05-05 NOTE — PROGRESS NOTE ADULT - PROBLEM SELECTOR PROBLEM 8
Chronic systolic congestive heart failure
Need for prophylactic measure
Chronic systolic congestive heart failure
Chronic systolic congestive heart failure

## 2023-05-05 NOTE — PROGRESS NOTE ADULT - PROBLEM SELECTOR PLAN 8
-s/p CICU stay in 2014 where he was found to be a candidate for biV ICD w/ recent lead replacement in January 2023 w/ Dr. Ivey  -continue current home meds: coreg, losartan, lasix  -appears euvolemic
-s/p CICU stay in 2014 where he was found to be a candidate for biV ICD w/ recent lead replacement in January 2023 w/ Dr. Ivey  -continue current home meds: coreg, losartan, lasix  -appears euvolemic
-s/p CICU stay in 2014 where he was found to be a candidate for biV ICD w/ recent lead replacement in January 2023 w/ Dr. Ivey  -continue current home meds: coreg, losartan, lasix  -appears euvolemic  -Restart above meds as clinically tolerated
Diet: DASH, NPO after MN  DVT: holding chem ppx after MN  Dispo: pending OR and PT eval
-s/p CICU stay in 2014 where he was found to be a candidate for biV ICD w/ recent lead replacement in January 2023 w/ Dr. Ivey  -continue current home meds: coreg, losartan, lasix  -appears euvolemic  -Restart above meds as clinically tolerated

## 2023-05-05 NOTE — PROGRESS NOTE ADULT - PROBLEM SELECTOR PLAN 2
-Last drink 4/27 between 7 and 9pm, per son pt has hx of withdrawal with severe agitation in the past, unclear if needed MICU for this. Son also states pt is not a daily drinker, just social/weekend drinker. Withdrawal delirium now resolved.   - complete ativan taper, but cont symptoms triggered ativan for now  -SBIRT consult
-Last drink 4/27 between 7 and 9pm, per son pt has hx of withdrawal with severe agitation in the past, unclear if needed MICU for this. Son also states pt is not a daily drinker, just social/weekend drinker. Withdrawal delirium now resolved.   -can stop symptom triggered ativan  -SBIRT consult
-Last drink 4/27 between 7 and 9pm, per son pt has hx of withdrawal with severe agitation in the past, unclear if needed MICU for this  -On exam was starting to develop mild tremors  -will start on ativan taper for now to prevent patient from severe withdrawal w/ PRN symptom triggered ativan  -SBIRT consult
-Last drink 4/27 between 7 and 9pm, per son pt has hx of withdrawal with severe agitation in the past, unclear if needed MICU for this. Improved  -On exam was starting to develop mild tremors, now appear resolved  - CIWA fluctuating between 1-3, unclear how much of this is baseline anxiety/agitation vs. true withdrawal, pt more awake, calm cooperative today  - complete ativan taper, but cont symptoms triggered ativan for now  -SBIRT consult
-Last drink 4/27 between 7 and 9pm, per son pt has hx of withdrawal with severe agitation in the past, unclear if needed MICU for this. Improved  -On exam was starting to develop mild tremors, now appear resolved  - CIWA fluctuating between 1-3, unclear how much of this is baseline anxiety/agitation vs. true withdrawal, pt more awake, calm cooperative today  - complete ativan taper, but cont symptoms triggered ativan for now  -SBIRT consult
-Last drink 4/27 between 7 and 9pm, per son pt has hx of withdrawal with severe agitation in the past, unclear if needed MICU for this. Still encephalopathic 5/1.   -On exam was starting to develop mild tremors, now appear resolved  - CIWA fluctuating between 2-6, unclear how much of this is baseline anxiety/agitation vs. true withdrawal, pt somnolent today  - complete ativan taper, but cont symptoms triggered ativan for now  -SBIRT consult
-Last drink 4/27 between 7 and 9pm, per son pt has hx of withdrawal with severe agitation in the past, unclear if needed MICU for this  -On exam was starting to develop mild tremors  -will start on ativan taper for now to prevent patient from severe withdrawal w/ PRN symptom triggered ativan. Per discussion with anesthesia, ativan taper is not contraindication for OR; as long as pt remains hemodynamically stable from a withdrawal perspective, no objection to proceed   -multivitamin, folate, thiamine  -SBIRT consult

## 2023-05-05 NOTE — PROGRESS NOTE ADULT - ASSESSMENT
73M w/ PMHx CAD s/p CABG (2004), CHF w/ biV ICD w/ recent lead replacement, HTN, DM, HLD, EtOH use admitted after a fall on 4/27 resulting in L intertrochanteric fracture now s/p left femur IMN. Course complicated by possible alcohol withdrawal with likely postop delirium, now improving, PT recommending ZONIA once patient off 1:1 x 24 hours.
73M w/ PMHx CAD s/p CABG (2004), CHF w/ biV ICD w/ recent lead replacement, HTN, DM, HLD, EtOH use admitted after a fall on 4/27 resulting in L intertrochanteric fracture now s/p left femur IMN. Course complicated by possible alcohol withdrawal with likely postop delirium, now with likely opiate-mediated delirium, awaiting transfer to rehab
NAOMI MORRISSEY is a 73y Male sp L hip IMN on 4/29. Recovering appropriately from orthopedic standpoint    PLAN:  - Multimodal pain control  - WBAT  - DVT: Lovenox  - Resume home meds  - PT/OT  - Care per primary team  - Dispo: pending PT    Orthopaedic Surgery  [facility] [id number]  [facility]        [id number]  [facility]  [id number]/ [phone].  
ASSESSMENT:   NAOMI MORRISSEY is a 73y Male sp L hip IMN on 4/29. Recovering appropriately    PLAN:  - Multimodal pain control  - WBAT  - DVT: Lovenox  - Abx 24hr  - Resume home meds  - PT/OT  - Care per primary team  - Dispo: pending PT    Orthopaedic Surgery  [facility] [id number]  [facility]        [id number]  [facility]  [id number]/ [phone].  
NAOMI MORRISSEY is a 73y Male sp L hip IMN on 4/29. Recovering appropriately from orthopedic standpoint    PLAN:  - Multimodal pain control  - WBAT  - DVT: Lovenox  - Resume home meds  - PT/OT  - Care per primary team  - Please reconsult orthopedics for any additional questions or concerns related to this patient's care.    Orthopaedic Surgery  [facility] [id number]  [facility]        [id number]  [facility]  [id number]/ [phone].  
73M w/ PMHx CAD s/p CABG (2004), CHF w/ biV ICD w/ recent lead replacement, HTN, DM, HLD, EtOH use admitted after a fall on 4/27 resulting in L intertrochanteric fracture now s/p left femur IMN. Course complicated by possible alcohol withdrawal.
73M w/ PMHx CAD s/p CABG (2004), CHF w/ biV ICD w/ recent lead replacement, HTN, DM, HLD, EtOH use admitted after a fall on 4/27 resulting in L intertrochanteric fracture now s/p left femur IMN. Course complicated by possible alcohol withdrawal with likely postop delirium, now improving, PT recommending ZONIA once patient off 1:1 x 24 hours.
73M w/ PMHx CAD s/p CABG (2004), CHF w/ biV ICD w/ recent lead replacement, HTN, DM, HLD, EtOH use admitted after a fall on 4/27 resulting in L intertrochanteric fracture now s/p left femur IMN. Course complicated by possible alcohol withdrawal, now improving, PT recommending ZONIA. 
73M w/ PMHx CAD s/p CABG (2004), CHF w/ biV ICD w/ recent lead replacement, HTN, DM, HLD, EtOH use admitted after a fall on 4/27 resulting in L intertrochanteric fracture now s/p left femur IMN. Course complicated by possible alcohol withdrawal.
73M w/ PMHx CAD s/p CABG (2004), CHF w/ biV ICD w/ recent lead replacement, HTN, DM, HLD, EtOH use admitted after a fall on 4/27 resulting in L intertrochanteric fracture.

## 2023-05-05 NOTE — PROGRESS NOTE ADULT - PROBLEM SELECTOR PROBLEM 3
NASIM (acute kidney injury)
Hypertension
NASIM (acute kidney injury)

## 2023-05-05 NOTE — PROGRESS NOTE ADULT - PROBLEM SELECTOR PROBLEM 6
CAD (coronary artery disease)
Hyperlipidemia

## 2023-05-05 NOTE — PROGRESS NOTE ADULT - SUBJECTIVE AND OBJECTIVE BOX
Rancho Urban MD  Division of Hospital Medicine  Available on MS teams until 7pm  If no response or off-hours, page [phone]  -------------------------------------    Patient is a 73y old  Male who presents with a chief complaint of L intertrochanteric fracture (04 May 2023 12:41)      SUBJECTIVE / OVERNIGHT EVENTS: pt became transiently confused after dose of oxycodone last night per son  ADDITIONAL REVIEW OF SYSTEMS: pt lucid today, states pain uncontrolled, requests additional pain medication. otherwise no new complaints.     MEDICATIONS  (STANDING):  acetaminophen     Tablet .. 500 milliGRAM(s) Oral every 6 hours  aspirin  chewable 81 milliGRAM(s) Oral daily  atorvastatin 80 milliGRAM(s) Oral at bedtime  carvedilol 25 milliGRAM(s) Oral every 12 hours  dextrose 5%. 1000 milliLiter(s) (50 mL/Hr) IV Continuous <Continuous>  dextrose 5%. 1000 milliLiter(s) (100 mL/Hr) IV Continuous <Continuous>  dextrose 50% Injectable 25 Gram(s) IV Push once  dextrose 50% Injectable 25 Gram(s) IV Push once  dextrose 50% Injectable 12.5 Gram(s) IV Push once  enoxaparin Injectable 40 milliGRAM(s) SubCutaneous every 24 hours  furosemide    Tablet 40 milliGRAM(s) Oral daily  gabapentin 300 milliGRAM(s) Oral two times a day  glucagon  Injectable 1 milliGRAM(s) IntraMuscular once  insulin lispro (ADMELOG) corrective regimen sliding scale   SubCutaneous three times a day before meals  insulin lispro (ADMELOG) corrective regimen sliding scale   SubCutaneous at bedtime  latanoprost 0.005% Ophthalmic Solution 1 Drop(s) Both EYES at bedtime  polyethylene glycol 3350 17 Gram(s) Oral daily  senna 2 Tablet(s) Oral at bedtime  traZODone 100 milliGRAM(s) Oral at bedtime    MEDICATIONS  (PRN):  bisacodyl Suppository 10 milliGRAM(s) Rectal daily PRN If no bowel movement  dextrose Oral Gel 15 Gram(s) Oral once PRN Blood Glucose LESS THAN 70 milliGRAM(s)/deciliter  LORazepam   Injectable 2 milliGRAM(s) IV Push every 2 hours PRN Symptom-triggered: 2 point increase in CIWA -Ar score and a total score of 7 or LESS  magnesium hydroxide Suspension 30 milliLiter(s) Oral daily PRN Constipation  traMADol 25 milliGRAM(s) Oral every 6 hours PRN Severe Pain (7 - 10)      CAPILLARY BLOOD GLUCOSE      POCT Blood Glucose.: 125 mg/dL (05 May 2023 08:31)  POCT Blood Glucose.: 124 mg/dL (04 May 2023 21:24)  POCT Blood Glucose.: 118 mg/dL (04 May 2023 17:09)  POCT Blood Glucose.: 116 mg/dL (04 May 2023 12:20)    I&O's Summary    04 May 2023 07:01  -  05 May 2023 07:00  --------------------------------------------------------  IN: 720 mL / OUT: 0 mL / NET: 720 mL    05 May 2023 07:01  -  05 May 2023 11:47  --------------------------------------------------------  IN: 240 mL / OUT: 0 mL / NET: 240 mL        PHYSICAL EXAM:  Vital Signs Last 24 Hrs  T(C): 36.8 (05 May 2023 06:22), Max: 36.9 (04 May 2023 20:13)  T(F): 98.3 (05 May 2023 06:22), Max: 98.5 (04 May 2023 20:13)  HR: 70 (05 May 2023 06:22) (65 - 70)  BP: 149/76 (05 May 2023 06:22) (113/63 - 149/76)  BP(mean): --  RR: 18 (05 May 2023 06:22) (18 - 18)  SpO2: 97% (05 May 2023 06:22) (97% - 99%)    Parameters below as of 05 May 2023 06:22  Patient On (Oxygen Delivery Method): room air      CONSTITUTIONAL: NAD  EYES: PERRLA; conjunctiva and sclera clear  ENMT: MMM  NECK: Supple  RESPIRATORY: Normal respiratory effort; CTAB  CARDIOVASCULAR: RRR, no JVD, no peripheral edema   ABDOMEN: Nontender to palpation, normoactive BS, no guarding/rigidity  MUSCLOSKELETAL:  no clubbing/cyanosis, no joint swelling or tenderness to palpation  PSYCH: A+O x 3, affect normal  NEUROLOGY: CN 2-12 are intact and symmetric; no gross sensory or motor deficits  SKIN: No rashes; no palpable lesions    LABS:                      RADIOLOGY & ADDITIONAL TESTS:  Results Reviewed:   Imaging Personally Reviewed:  Electrocardiogram Personally Reviewed:    COORDINATION OF CARE:  Care Discussed with Consultants/Other Providers [Y/N]:  Prior or Outpatient Records Reviewed [Y/N]:

## 2023-05-05 NOTE — PROGRESS NOTE ADULT - PROBLEM SELECTOR PROBLEM 7
CAD (coronary artery disease)
CAD (coronary artery disease)
Chronic systolic congestive heart failure
CAD (coronary artery disease)

## 2023-05-05 NOTE — PROGRESS NOTE ADULT - PROBLEM SELECTOR PLAN 6
-c/w lipitor
-s/p CABG  -c/w ASA, statin
-c/w lipitor

## 2023-05-05 NOTE — PROGRESS NOTE ADULT - PROBLEM SELECTOR PLAN 5
-pt takes repaglinide only, sugars are currently in range
-c/w lipitor
-pt takes repaglinide only, sugars are currently in range
-pt takes repaglinide only, sugars are currently in range

## 2023-05-05 NOTE — PROGRESS NOTE ADULT - PROBLEM SELECTOR PLAN 9
Diet: DASH  DVT: Lovenox  Dispo: PT pending
Diet: DASH  DVT: Lovenox  Dispo: PT rec ZONIA    plan discussed with and agreed on by tash Bro, and by pt. CM aware.
Diet: DASH  DVT: Lovenox  Dispo: PT rec ZONIA
Diet: DASH  DVT: Lovenox  Dispo: PT pending
Diet: DASH  DVT: Lovenox  Dispo: PT rec ZONIA    plan discussed with and agreed on by tash Bro, and by pt. CM aware. Planned for transfer to rehab today at 12pn noon.     Total discharge time: 45 minutes.
Diet: DASH  DVT: Lovenox  Dispo: PT rec ZONIA

## 2023-05-05 NOTE — PROGRESS NOTE ADULT - PROBLEM SELECTOR PLAN 3
-NASIM noted on 4/30 and likely hemodynamically mediated from relative hypotension  -Monitor Cr  -Holding Losartan, Lasix and Coreg
-c/w home coreg, lasix, losartan  -suspect current hypertension is 2/2 missing medication doses and concurrent pain

## 2023-05-05 NOTE — PROGRESS NOTE ADULT - PROBLEM SELECTOR PLAN 4
-Holding Losartan, Lasix and Coreg  -Reintroduce Coreg first if BP improves
-check A1c in Am  -pt takes repaglinide only, sugars are currently in range. If sugars start rising, can put pt on ISS w/ FS q6h while NPO

## 2023-05-05 NOTE — DISCHARGE NOTE NURSING/CASE MANAGEMENT/SOCIAL WORK - PATIENT PORTAL LINK FT
You can access the FollowMyHealth Patient Portal offered by [facility] by registering at the following website: [email or web address]. By joining [employer]’s FollowMyHealth portal, you will also be able to view your health information using other applications (apps) compatible with our system.

## 2023-05-17 RX ORDER — FUROSEMIDE 40 MG
1 TABLET ORAL
Refills: 0 | DISCHARGE

## 2023-05-17 RX ORDER — LOSARTAN POTASSIUM 100 MG/1
1 TABLET, FILM COATED ORAL
Refills: 0 | DISCHARGE

## 2023-05-17 RX ORDER — TRAZODONE HCL 50 MG
1 TABLET ORAL
Refills: 0 | DISCHARGE

## 2023-05-17 RX ORDER — GABAPENTIN 400 MG/1
1 CAPSULE ORAL
Refills: 0 | DISCHARGE

## 2023-05-17 RX ORDER — ASPIRIN/CALCIUM CARB/MAGNESIUM 324 MG
1 TABLET ORAL
Refills: 0 | DISCHARGE

## 2023-05-17 RX ORDER — ATORVASTATIN CALCIUM 80 MG/1
1 TABLET, FILM COATED ORAL
Refills: 0 | DISCHARGE

## 2023-05-17 RX ORDER — CARVEDILOL PHOSPHATE 80 MG/1
1 CAPSULE, EXTENDED RELEASE ORAL
Refills: 0 | DISCHARGE

## 2023-05-23 PROBLEM — I50.22 CHRONIC SYSTOLIC (CONGESTIVE) HEART FAILURE: Chronic | Status: ACTIVE | Noted: 2023-04-28

## 2023-05-23 PROBLEM — I25.10 ATHEROSCLEROTIC HEART DISEASE OF NATIVE CORONARY ARTERY WITHOUT ANGINA PECTORIS: Chronic | Status: ACTIVE | Noted: 2023-04-28

## 2023-05-23 PROBLEM — E11.9 TYPE 2 DIABETES MELLITUS WITHOUT COMPLICATIONS: Chronic | Status: ACTIVE | Noted: 2023-04-28

## 2023-05-25 ENCOUNTER — INPATIENT (INPATIENT)
Facility: HOSPITAL | Age: 73
LOS: 6 days | Discharge: HOME | DRG: 194 | End: 2023-06-01
Attending: HOSPITALIST | Admitting: HOSPITALIST
Payer: MEDICARE

## 2023-05-25 VITALS
WEIGHT: 128.97 LBS | DIASTOLIC BLOOD PRESSURE: 70 MMHG | HEART RATE: 76 BPM | OXYGEN SATURATION: 94 % | TEMPERATURE: 98 F | HEIGHT: 68 IN | RESPIRATION RATE: 18 BRPM | SYSTOLIC BLOOD PRESSURE: 148 MMHG

## 2023-05-25 DIAGNOSIS — Z95.0 PRESENCE OF CARDIAC PACEMAKER: Chronic | ICD-10-CM

## 2023-05-25 DIAGNOSIS — Z95.1 PRESENCE OF AORTOCORONARY BYPASS GRAFT: Chronic | ICD-10-CM

## 2023-05-25 DIAGNOSIS — Z95.810 PRESENCE OF AUTOMATIC (IMPLANTABLE) CARDIAC DEFIBRILLATOR: Chronic | ICD-10-CM

## 2023-05-25 PROCEDURE — 99285 EMERGENCY DEPT VISIT HI MDM: CPT | Mod: GC

## 2023-05-26 DIAGNOSIS — I50.9 HEART FAILURE, UNSPECIFIED: ICD-10-CM

## 2023-05-26 DIAGNOSIS — J18.9 PNEUMONIA, UNSPECIFIED ORGANISM: ICD-10-CM

## 2023-05-26 DIAGNOSIS — R06.00 DYSPNEA, UNSPECIFIED: ICD-10-CM

## 2023-05-26 DIAGNOSIS — I10 ESSENTIAL (PRIMARY) HYPERTENSION: ICD-10-CM

## 2023-05-26 DIAGNOSIS — Z29.9 ENCOUNTER FOR PROPHYLACTIC MEASURES, UNSPECIFIED: ICD-10-CM

## 2023-05-26 DIAGNOSIS — E11.9 TYPE 2 DIABETES MELLITUS WITHOUT COMPLICATIONS: ICD-10-CM

## 2023-05-26 DIAGNOSIS — D64.9 ANEMIA, UNSPECIFIED: ICD-10-CM

## 2023-05-26 DIAGNOSIS — I25.10 ATHEROSCLEROTIC HEART DISEASE OF NATIVE CORONARY ARTERY WITHOUT ANGINA PECTORIS: ICD-10-CM

## 2023-05-26 LAB
ALBUMIN SERPL ELPH-MCNC: 3.7 G/DL — SIGNIFICANT CHANGE UP (ref 3.3–5)
ALP SERPL-CCNC: 122 U/L — HIGH (ref 40–120)
ALT FLD-CCNC: 20 U/L — SIGNIFICANT CHANGE UP (ref 10–45)
ANION GAP SERPL CALC-SCNC: 11 MMOL/L — SIGNIFICANT CHANGE UP (ref 5–17)
AST SERPL-CCNC: 24 U/L — SIGNIFICANT CHANGE UP (ref 10–40)
BILIRUB SERPL-MCNC: 0.3 MG/DL — SIGNIFICANT CHANGE UP (ref 0.2–1.2)
BUN SERPL-MCNC: 11 MG/DL — SIGNIFICANT CHANGE UP (ref 7–23)
CALCIUM SERPL-MCNC: 9.5 MG/DL — SIGNIFICANT CHANGE UP (ref 8.4–10.5)
CHLORIDE SERPL-SCNC: 103 MMOL/L — SIGNIFICANT CHANGE UP (ref 96–108)
CO2 SERPL-SCNC: 25 MMOL/L — SIGNIFICANT CHANGE UP (ref 22–31)
CREAT SERPL-MCNC: 1 MG/DL — SIGNIFICANT CHANGE UP (ref 0.5–1.3)
EGFR: 78 ML/MIN/1.73M2 — SIGNIFICANT CHANGE UP
GLUCOSE BLDC GLUCOMTR-MCNC: 105 MG/DL — HIGH (ref 70–99)
GLUCOSE BLDC GLUCOMTR-MCNC: 143 MG/DL — HIGH (ref 70–99)
GLUCOSE SERPL-MCNC: 93 MG/DL — SIGNIFICANT CHANGE UP (ref 70–99)
HCT VFR BLD CALC: 30.1 % — LOW (ref 39–50)
HGB BLD-MCNC: 9.4 G/DL — LOW (ref 13–17)
HPIV3 RNA SPEC QL NAA+PROBE: DETECTED
MCHC RBC-ENTMCNC: 28.4 PG — SIGNIFICANT CHANGE UP (ref 27–34)
MCHC RBC-ENTMCNC: 31.2 GM/DL — LOW (ref 32–36)
MCV RBC AUTO: 90.9 FL — SIGNIFICANT CHANGE UP (ref 80–100)
NRBC # BLD: 0 /100 WBCS — SIGNIFICANT CHANGE UP (ref 0–0)
NT-PROBNP SERPL-SCNC: 1621 PG/ML — HIGH (ref 0–300)
PLATELET # BLD AUTO: 181 K/UL — SIGNIFICANT CHANGE UP (ref 150–400)
POTASSIUM SERPL-MCNC: 4 MMOL/L — SIGNIFICANT CHANGE UP (ref 3.5–5.3)
POTASSIUM SERPL-SCNC: 4 MMOL/L — SIGNIFICANT CHANGE UP (ref 3.5–5.3)
PROCALCITONIN SERPL-MCNC: 0.12 NG/ML — HIGH (ref 0.02–0.1)
PROT SERPL-MCNC: 7.7 G/DL — SIGNIFICANT CHANGE UP (ref 6–8.3)
RAPID RVP RESULT: DETECTED
RBC # BLD: 3.31 M/UL — LOW (ref 4.2–5.8)
RBC # FLD: 14.2 % — SIGNIFICANT CHANGE UP (ref 10.3–14.5)
SARS-COV-2 RNA SPEC QL NAA+PROBE: SIGNIFICANT CHANGE UP
SODIUM SERPL-SCNC: 139 MMOL/L — SIGNIFICANT CHANGE UP (ref 135–145)
TROPONIN T, HIGH SENSITIVITY RESULT: 26 NG/L — SIGNIFICANT CHANGE UP (ref 0–51)
TROPONIN T, HIGH SENSITIVITY RESULT: 27 NG/L — SIGNIFICANT CHANGE UP (ref 0–51)
WBC # BLD: 6.55 K/UL — SIGNIFICANT CHANGE UP (ref 3.8–10.5)
WBC # FLD AUTO: 6.55 K/UL — SIGNIFICANT CHANGE UP (ref 3.8–10.5)

## 2023-05-26 PROCEDURE — 71045 X-RAY EXAM CHEST 1 VIEW: CPT | Mod: 26

## 2023-05-26 PROCEDURE — 99223 1ST HOSP IP/OBS HIGH 75: CPT | Mod: GC

## 2023-05-26 PROCEDURE — 93306 TTE W/DOPPLER COMPLETE: CPT | Mod: 26

## 2023-05-26 PROCEDURE — 93010 ELECTROCARDIOGRAM REPORT: CPT

## 2023-05-26 PROCEDURE — 71250 CT THORAX DX C-: CPT | Mod: 26,MA

## 2023-05-26 RX ORDER — REPAGLINIDE 1 MG/1
1 TABLET ORAL
Refills: 0 | DISCHARGE

## 2023-05-26 RX ORDER — DEXTROSE 50 % IN WATER 50 %
25 SYRINGE (ML) INTRAVENOUS ONCE
Refills: 0 | Status: DISCONTINUED | OUTPATIENT
Start: 2023-05-26 | End: 2023-06-01

## 2023-05-26 RX ORDER — AZITHROMYCIN 500 MG/1
500 TABLET, FILM COATED ORAL DAILY
Refills: 0 | Status: COMPLETED | OUTPATIENT
Start: 2023-05-27 | End: 2023-05-28

## 2023-05-26 RX ORDER — INSULIN LISPRO 100/ML
VIAL (ML) SUBCUTANEOUS
Refills: 0 | Status: DISCONTINUED | OUTPATIENT
Start: 2023-05-26 | End: 2023-06-01

## 2023-05-26 RX ORDER — SODIUM CHLORIDE 9 MG/ML
1000 INJECTION, SOLUTION INTRAVENOUS
Refills: 0 | Status: DISCONTINUED | OUTPATIENT
Start: 2023-05-26 | End: 2023-06-01

## 2023-05-26 RX ORDER — FUROSEMIDE 40 MG
40 TABLET ORAL DAILY
Refills: 0 | Status: DISCONTINUED | OUTPATIENT
Start: 2023-05-26 | End: 2023-05-26

## 2023-05-26 RX ORDER — ACETAMINOPHEN 500 MG
650 TABLET ORAL ONCE
Refills: 0 | Status: COMPLETED | OUTPATIENT
Start: 2023-05-26 | End: 2023-05-26

## 2023-05-26 RX ORDER — TRAMADOL HYDROCHLORIDE 50 MG/1
50 TABLET ORAL ONCE
Refills: 0 | Status: DISCONTINUED | OUTPATIENT
Start: 2023-05-26 | End: 2023-05-26

## 2023-05-26 RX ORDER — GABAPENTIN 400 MG/1
300 CAPSULE ORAL
Refills: 0 | Status: DISCONTINUED | OUTPATIENT
Start: 2023-05-26 | End: 2023-06-01

## 2023-05-26 RX ORDER — FUROSEMIDE 40 MG
1 TABLET ORAL
Qty: 0 | Refills: 0 | DISCHARGE

## 2023-05-26 RX ORDER — GABAPENTIN 400 MG/1
1 CAPSULE ORAL
Qty: 0 | Refills: 0 | DISCHARGE

## 2023-05-26 RX ORDER — REPAGLINIDE 1 MG/1
1 TABLET ORAL
Refills: 0 | Status: DISCONTINUED | OUTPATIENT
Start: 2023-05-26 | End: 2023-05-27

## 2023-05-26 RX ORDER — REPAGLINIDE 1 MG/1
1 TABLET ORAL
Qty: 0 | Refills: 0 | DISCHARGE

## 2023-05-26 RX ORDER — SENNA PLUS 8.6 MG/1
2 TABLET ORAL AT BEDTIME
Refills: 0 | Status: DISCONTINUED | OUTPATIENT
Start: 2023-05-26 | End: 2023-06-01

## 2023-05-26 RX ORDER — GLUCAGON INJECTION, SOLUTION 0.5 MG/.1ML
1 INJECTION, SOLUTION SUBCUTANEOUS ONCE
Refills: 0 | Status: DISCONTINUED | OUTPATIENT
Start: 2023-05-26 | End: 2023-06-01

## 2023-05-26 RX ORDER — CEFEPIME 1 G/1
2000 INJECTION, POWDER, FOR SOLUTION INTRAMUSCULAR; INTRAVENOUS ONCE
Refills: 0 | Status: COMPLETED | OUTPATIENT
Start: 2023-05-26 | End: 2023-05-26

## 2023-05-26 RX ORDER — ENOXAPARIN SODIUM 100 MG/ML
40 INJECTION SUBCUTANEOUS EVERY 24 HOURS
Refills: 0 | Status: DISCONTINUED | OUTPATIENT
Start: 2023-05-26 | End: 2023-06-01

## 2023-05-26 RX ORDER — IPRATROPIUM/ALBUTEROL SULFATE 18-103MCG
3 AEROSOL WITH ADAPTER (GRAM) INHALATION EVERY 6 HOURS
Refills: 0 | Status: DISCONTINUED | OUTPATIENT
Start: 2023-05-26 | End: 2023-05-29

## 2023-05-26 RX ORDER — DEXTROSE 50 % IN WATER 50 %
12.5 SYRINGE (ML) INTRAVENOUS ONCE
Refills: 0 | Status: DISCONTINUED | OUTPATIENT
Start: 2023-05-26 | End: 2023-06-01

## 2023-05-26 RX ORDER — LOSARTAN POTASSIUM 100 MG/1
50 TABLET, FILM COATED ORAL DAILY
Refills: 0 | Status: DISCONTINUED | OUTPATIENT
Start: 2023-05-26 | End: 2023-05-27

## 2023-05-26 RX ORDER — IPRATROPIUM/ALBUTEROL SULFATE 18-103MCG
3 AEROSOL WITH ADAPTER (GRAM) INHALATION ONCE
Refills: 0 | Status: COMPLETED | OUTPATIENT
Start: 2023-05-26 | End: 2023-05-26

## 2023-05-26 RX ORDER — TRAZODONE HCL 50 MG
100 TABLET ORAL AT BEDTIME
Refills: 0 | Status: DISCONTINUED | OUTPATIENT
Start: 2023-05-26 | End: 2023-06-01

## 2023-05-26 RX ORDER — CARVEDILOL PHOSPHATE 80 MG/1
1 CAPSULE, EXTENDED RELEASE ORAL
Qty: 0 | Refills: 0 | DISCHARGE

## 2023-05-26 RX ORDER — FUROSEMIDE 40 MG
40 TABLET ORAL DAILY
Refills: 0 | Status: DISCONTINUED | OUTPATIENT
Start: 2023-05-27 | End: 2023-05-28

## 2023-05-26 RX ORDER — LATANOPROST 0.05 MG/ML
1 SOLUTION/ DROPS OPHTHALMIC; TOPICAL AT BEDTIME
Refills: 0 | Status: DISCONTINUED | OUTPATIENT
Start: 2023-05-26 | End: 2023-06-01

## 2023-05-26 RX ORDER — DEXTROSE 50 % IN WATER 50 %
15 SYRINGE (ML) INTRAVENOUS ONCE
Refills: 0 | Status: DISCONTINUED | OUTPATIENT
Start: 2023-05-26 | End: 2023-06-01

## 2023-05-26 RX ORDER — POLYETHYLENE GLYCOL 3350 17 G/17G
17 POWDER, FOR SOLUTION ORAL
Refills: 0 | Status: DISCONTINUED | OUTPATIENT
Start: 2023-05-26 | End: 2023-06-01

## 2023-05-26 RX ORDER — ASPIRIN/CALCIUM CARB/MAGNESIUM 324 MG
81 TABLET ORAL DAILY
Refills: 0 | Status: DISCONTINUED | OUTPATIENT
Start: 2023-05-26 | End: 2023-06-01

## 2023-05-26 RX ORDER — CARVEDILOL PHOSPHATE 80 MG/1
25 CAPSULE, EXTENDED RELEASE ORAL EVERY 12 HOURS
Refills: 0 | Status: DISCONTINUED | OUTPATIENT
Start: 2023-05-26 | End: 2023-06-01

## 2023-05-26 RX ORDER — ATORVASTATIN CALCIUM 80 MG/1
80 TABLET, FILM COATED ORAL AT BEDTIME
Refills: 0 | Status: DISCONTINUED | OUTPATIENT
Start: 2023-05-26 | End: 2023-06-01

## 2023-05-26 RX ORDER — CEFEPIME 1 G/1
2000 INJECTION, POWDER, FOR SOLUTION INTRAMUSCULAR; INTRAVENOUS EVERY 12 HOURS
Refills: 0 | Status: DISCONTINUED | OUTPATIENT
Start: 2023-05-26 | End: 2023-05-26

## 2023-05-26 RX ORDER — AZITHROMYCIN 500 MG/1
500 TABLET, FILM COATED ORAL ONCE
Refills: 0 | Status: COMPLETED | OUTPATIENT
Start: 2023-05-26 | End: 2023-05-26

## 2023-05-26 RX ORDER — INSULIN LISPRO 100/ML
VIAL (ML) SUBCUTANEOUS AT BEDTIME
Refills: 0 | Status: DISCONTINUED | OUTPATIENT
Start: 2023-05-26 | End: 2023-06-01

## 2023-05-26 RX ORDER — LOSARTAN POTASSIUM 100 MG/1
1 TABLET, FILM COATED ORAL
Qty: 0 | Refills: 0 | DISCHARGE

## 2023-05-26 RX ADMIN — TRAMADOL HYDROCHLORIDE 50 MILLIGRAM(S): 50 TABLET ORAL at 10:57

## 2023-05-26 RX ADMIN — Medication 100 MILLIGRAM(S): at 02:26

## 2023-05-26 RX ADMIN — Medication 100 MILLIGRAM(S): at 21:15

## 2023-05-26 RX ADMIN — Medication 3 MILLILITER(S): at 00:44

## 2023-05-26 RX ADMIN — AZITHROMYCIN 255 MILLIGRAM(S): 500 TABLET, FILM COATED ORAL at 07:38

## 2023-05-26 RX ADMIN — ATORVASTATIN CALCIUM 80 MILLIGRAM(S): 80 TABLET, FILM COATED ORAL at 21:16

## 2023-05-26 RX ADMIN — Medication 100 MILLIGRAM(S): at 21:16

## 2023-05-26 RX ADMIN — GABAPENTIN 300 MILLIGRAM(S): 400 CAPSULE ORAL at 18:01

## 2023-05-26 RX ADMIN — Medication 81 MILLIGRAM(S): at 16:20

## 2023-05-26 RX ADMIN — CEFEPIME 100 MILLIGRAM(S): 1 INJECTION, POWDER, FOR SOLUTION INTRAMUSCULAR; INTRAVENOUS at 06:51

## 2023-05-26 RX ADMIN — SENNA PLUS 2 TABLET(S): 8.6 TABLET ORAL at 21:15

## 2023-05-26 RX ADMIN — Medication 650 MILLIGRAM(S): at 21:14

## 2023-05-26 RX ADMIN — ENOXAPARIN SODIUM 40 MILLIGRAM(S): 100 INJECTION SUBCUTANEOUS at 16:20

## 2023-05-26 RX ADMIN — Medication 3 MILLILITER(S): at 16:20

## 2023-05-26 RX ADMIN — Medication 3 MILLILITER(S): at 22:36

## 2023-05-26 RX ADMIN — LOSARTAN POTASSIUM 50 MILLIGRAM(S): 100 TABLET, FILM COATED ORAL at 16:19

## 2023-05-26 RX ADMIN — CARVEDILOL PHOSPHATE 25 MILLIGRAM(S): 80 CAPSULE, EXTENDED RELEASE ORAL at 18:01

## 2023-05-26 NOTE — H&P ADULT - PROBLEM SELECTOR PLAN 3
- s/p CICU admission in 2014 requiring BiV ICD  - TTE: EF 51%, moderate DD, segmental abnormalities  - on home lasix 40mg qd  - exam with wheezing, no crackles or LE edema  - unable to tolerate lying flat, unclear chronicity  > continue diuresis with lasix 40mg IV qd  > strict I/Os  > daily BMP, maintain K>4, Mg>2

## 2023-05-26 NOTE — H&P ADULT - ATTENDING COMMENTS
73M w/ PMHx CAD s/p CABG (2004), CHF w/ biV ICD w/ recent lead replacement, HTN, DM, HLD, EtOH use, recent admission for ORIF in April, presenting for SOB.     #Shortness of breath 2/2 viral PNA  -likely in setting of Parainfluenza, low suspicion for superimposed bacterial process  -CT Chest showed RUL opacity   -on 2L 98%, wean as tolerated  -supportive measures with standing nebs, prn tylenol for pain  -urine legionella given pt reporting AC usage  -r/o HF component, 1621 proBNP  -TTE showed EF 51% w/ mod diastolic dysfunction and hypokinetic areas c/w ischemic heart disease; known severe CHF s/p biV ICD placement and recent replacement earlier this year  -lasix 40 IV daily for now, daily weights, strict I/Os  -c/w coreg and losartan    PT eval    Rest of plan as above, d/w HS 8

## 2023-05-26 NOTE — H&P ADULT - NSHPSOCIALHISTORY_GEN_ALL_CORE
from rehab, lived with wife previously  independent with ADL/iADLs  walks with a walker since [facility]  ~50 pack-year smoking history, last cigarette ~1 mo ago  3 drinks of vodka on weekends, last drink ~1 mo ago  no recreational drug use

## 2023-05-26 NOTE — ED PROVIDER NOTE - NSICDXPASTSURGICALHX_GEN_ALL_CORE_FT
PAST SURGICAL HISTORY:  S/P CABG (coronary artery bypass graft)     S/P CABG (coronary artery bypass graft)     S/P ICD (internal cardiac defibrillator) procedure     Status post biventricular pacemaker

## 2023-05-26 NOTE — H&P ADULT - PROBLEM SELECTOR PLAN 5
- Hgb 9.4, around baseline  - likely AOCD, no overt s/s bleeding  > daily CBC monitoring  > maintain active T&S, Hgb goal >8

## 2023-05-26 NOTE — ED ADULT NURSE NOTE - NSFALLHARMRISKINTERV_ED_ALL_ED
Assistance OOB with selected safe patient handling equipment if applicable/Assistance with ambulation/Communicate risk of Fall with Harm to all staff, patient, and family/Monitor gait and stability/Provide visual cue: red socks, yellow wristband, yellow gown, etc/Reinforce activity limits and safety measures with patient and family/Bed in lowest position, wheels locked, appropriate side rails in place/Call bell, personal items and telephone in reach/Instruct patient to call for assistance before getting out of bed/chair/stretcher/Non-slip footwear applied when patient is off stretcher/[place] to call system/Physically safe environment - no spills, clutter or unnecessary equipment/Purposeful Proactive Rounding/Room/bathroom lighting operational, light cord in reach Assistance OOB with selected safe patient handling equipment if applicable/Assistance with ambulation/Communicate risk of Fall with Harm to all staff, patient, and family/Monitor gait and stability/Provide patient with walking aids/Provide visual cue: red socks, yellow wristband, yellow gown, etc/Reinforce activity limits and safety measures with patient and family/Bed in lowest position, wheels locked, appropriate side rails in place/Call bell, personal items and telephone in reach/Instruct patient to call for assistance before getting out of bed/chair/stretcher/Non-slip footwear applied when patient is off stretcher/[place] to call system/Physically safe environment - no spills, clutter or unnecessary equipment/Purposeful Proactive Rounding/Room/bathroom lighting operational, light cord in reach

## 2023-05-26 NOTE — H&P ADULT - PROBLEM SELECTOR PLAN 1
- presenting with acute dyspnea on exertion for 1 day  - associated with myalgias, HA, viral syndrome  - RVP+ parainfluenza  - TTE: EF 51%, mod LV diastolic dysfunction  - ddx viral bronchitis with anxiety component vs less HF exacerbation  > PRN supplemental oxygen for comfort  > trazadone 100mg qhs  > continue duonebs q6h for wheezing

## 2023-05-26 NOTE — ED PROVIDER NOTE - OBJECTIVE STATEMENT
Patient is a 73-year-old male with history of CAD s/p CABG, PPM w/ recent lead replacement, HTN, DM, HLD, EtOH use   Status post ORIF x2 days ago currently at subacute rehab facility presenting to the emergency department with shortness of breath.  per son who is at bedside patient has had congestion and sore throat.  No chest pain.  Cough is somewhat productive.  No fever, chills.  Was using DayQuil and NyQuil with minimal relief.

## 2023-05-26 NOTE — ED ADULT NURSE NOTE - OBJECTIVE STATEMENT
73y M BIBEMS for SOB. As per EMS pt had a sudden increase in SOB that stated after dinner and his saturation was in the low 90's. Pt brought to ED without O2. Son at bedside states that yesterday pt was complaining of a mild cough, and today a 15 minute episode of a runny nose. Son also states that pt has difficulty breathing when laying flad.  After dinner at 530pm pt had a sudden increase in SOB and called son. Upon sons arrival pt was breathing heavy. Son had pt sit up to help with breathing and monitor. After 3 hours of playing cards pt did not improve and called for EMS. Upon assessment pt SOB and dyspnea when talking. Pt placed on 2L NC brining o2 up to 97%. On ascultation crackles at B/L base of lungs and audible upper respiratory wheeze. Pt endorses mild cough that is moist and unproductive as well as left lower back pain. Denies any fever/N/V/D/Cp/Gi/ symptoms. PMH of CHF, CAD, DM, DLH, HTN and neuropathy. PSH of CABG, AICD and Left hip replacement.

## 2023-05-26 NOTE — ED PROVIDER NOTE - NSICDXPASTMEDICALHX_GEN_ALL_CORE_FT
PAST MEDICAL HISTORY:  CAD (coronary artery disease)     Chronic systolic congestive heart failure     Coronary artery disease     Diabetes mellitus     Diabetes type 2, controlled     HLD (hyperlipidemia)     Hyperlipidemia     Hypertension     Hypertension     S/P CABG x 1     S/P ICD (internal cardiac defibrillator) procedure

## 2023-05-26 NOTE — ED PROVIDER NOTE - NS ED ROS FT
CONSTITUTIONAL: No fevers, no chills, no lightheadedness, no dizziness  EYES: no visual changes, no eye pain  EARS: no ear drainage, no ear pain, no change in hearing  NOSE: + nasal congestion  MOUTH/THROAT: + sore throat  CV: No chest pain, no palpitations  RESP: see hpi   GI: No n/v/d, no abd pain  : no dysuria, no hematuria, no flank pain  MSK: no back pain, no extremity pain  SKIN: no rashes  NEURO: no headache, no focal weakness, no decreased sensation/parasthesias   PSYCHIATRIC: no known mental health issues

## 2023-05-26 NOTE — H&P ADULT - PROBLEM SELECTOR PLAN 6
- on home losartan 50mg qd & coreg 25mg bid  - hypertensive to 180s, likely iso missed meds   > resume home anti-hypertensives  > monitor BP, uptitrate losartan as indicated

## 2023-05-26 NOTE — H&P ADULT - NSICDXPASTMEDICALHX_GEN_ALL_CORE_FT
PAST MEDICAL HISTORY:  CAD (coronary artery disease)     Chronic systolic congestive heart failure     Diabetes mellitus     Hyperlipidemia     Hypertension

## 2023-05-26 NOTE — ED ADULT NURSE REASSESSMENT NOTE - NS ED NURSE REASSESS COMMENT FT1
Awake, alert and orientedx4. With occasional non productive cough. Denies chest pain, mild sob noted on exertion. O2 sat 22-24/min. Maintained with O2 2l/nc. O2 sat 98-99%. Safety maintained. Awake, alert and orientedx4. With occasional non productive cough. Denies chest pain, mild sob noted on exertion. O2 sat 22-24/min. Maintained with O2 2l/nc. O2 sat 98-99%. Dressing to left lateral thigh clean and intact. Safety maintained.

## 2023-05-26 NOTE — H&P ADULT - ASSESSMENT
73M with hx of CAD s/p CABG in 2004, CHF with biV ICD, HTN, HLD, DM, EtOH use, recent ORIF on 4/29/23 s/p fall presenting from rehab for shortness of breath.  73M with hx of CAD s/p CABG in 2004, CHF with biV ICD, HTN, HLD, DM, EtOH use, recent ORIF on 4/29/23 s/p fall presenting from rehab for shortness of breath likely iso viral bronchitis.

## 2023-05-26 NOTE — H&P ADULT - PROBLEM SELECTOR PLAN 2
- RVP+ parainfluenza  - no leukocytosis, mild pro-danica elevation  - CXR clear lungs  - CT chest: RUL small airway opacities  - likely viral process, unlikely superimposed bacterial infxn  > discontinue cefepime  > continue azithromycin until legionella   > check MRSA swab

## 2023-05-26 NOTE — CHART NOTE - NSCHARTNOTEFT_GEN_A_CORE
Discussed with patient and son Dieudonne at bedside regarding taking lasix tonight. It was discussed that patient did not receive any diuretics today and would recommend lasix IVP for his symptoms and HF management. Patient understood the indications of lasix, however, does not wish to take any dose today as he will have to frequently urinate overnight. Options for condom catheter was discussed, however, patient unwilling. Will schedule for lasix 5AM.

## 2023-05-26 NOTE — H&P ADULT - NSHPREVIEWOFSYSTEMS_GEN_ALL_CORE
Constitutional: No fever, weight loss, or fatigue  Eyes: No eye pain, visual disturbances, or discharge  ENMT:  No difficulty hearing, tinnitus, vertigo; No sinus or throat pain  Respiratory: No cough, wheezing, chills or hemoptysis; No shortness of breath  Cardiovascular: No chest pain, palpitations, dizziness, or leg swelling  Gastrointestinal: No abdominal or epigastric pain. No nausea, vomiting, or hematemesis; No diarrhea or constipation. No melena or hematochezia.  Genitourinary: No dysuria, frequency, hematuria, or incontinence  Neuro: No headaches, memory loss, loss of strength, numbness, or tremors  Skin: No itching, burning, rashes, or lesions   Musculoskeletal: No joint pain or swelling; No muscle, back, or extremity pain Constitutional: no fevers or chills  Eyes:   ENMT: no hearing changes or sore throat  Respiratory: +shortness of breath, +cough, no hemoptysis  Cardiovascular: no chest pain, palpitations, leg swelling  Gastrointestinal: no abdominal pain, nausea, vomiting, diarrhea, +constipation  Genitourinary: No dysuria, frequency, hematuria, or incontinence  Neuro: +HA,   Skin: No itching, burning, rashes, or lesions   Musculoskeletal: No joint pain or swelling; No muscle, back, or extremity pain Constitutional: no fevers or chills  Eyes:   ENMT: no hearing changes or sore throat  Respiratory: +shortness of breath, +cough, no hemoptysis  Cardiovascular: no chest pain, palpitations, leg swelling  Gastrointestinal: no abdominal pain, nausea, vomiting, diarrhea, +constipation  Genitourinary: no dysuria or hematuria  Neuro: +HA, no weakness  Skin: no rash or lesions  Musculoskeletal: +body, muscle, lower back pain Constitutional: no fevers or chills  Eyes: no eye pain or discharge  ENMT: no hearing changes or sore throat  Respiratory: +shortness of breath, +cough, no hemoptysis  Cardiovascular: no chest pain, palpitations, leg swelling  Gastrointestinal: no abdominal pain, nausea, vomiting, diarrhea, +constipation  Genitourinary: no dysuria or hematuria  Neuro: +HA, no weakness  Skin: no rash or lesions  Musculoskeletal: +body, muscle, lower back pain

## 2023-05-26 NOTE — H&P ADULT - HISTORY OF PRESENT ILLNESS
73M with hx of CAD s/p CABG in 2004, CHF with biV ICD, HTN, HLD, DM, EtOH use, recent ORIF on 4/29/23 s/p fall presenting from rehab for shortness of breath.  73M with hx of CAD s/p CABG in 2004, CHF with biV ICD, HTN, HLD, DM, EtOH use, recent ORIF on 4/29/23 s/p fall presenting from rehab for shortness of breath. Patient was eating dinner yesterday and started coughing after his meal. He  73M with hx of CAD s/p CABG in 2004, CHF with biV ICD, HTN, HLD, DM, EtOH use, recent ORIF on 4/29/23 s/p fall presenting from rehab for shortness of breath. Patient was working with physical therapy yesterday afternoon when he started noticing SOB. He tried to calm down by calling his son and relaxing. He     VS:  Lab:  Imaging:   Intervention:  Admitted to medicine for further work-up and management of dyspnea.  73M with hx of CAD s/p CABG in 2004, CHF with biV ICD, HTN, HLD, DM, EtOH use, recent ORIF on 4/29/23 s/p fall presenting from rehab for shortness of breath. Patient was working with physical therapy yesterday afternoon when he started noticing SOB. He tried to calm down by calling his son and relaxing. He     VS: afebrile, HR 76, /70, RR 18, SpO2 94% on RA  Lab: Hgb 9.4, pro-danica 0.12, trop negative x2, pro-BNP 1621, RVP+ parainfluenza  Imaging: CXR clear lungs, CT chest RUL small airway opacities  Intervention: azithromycin/cefepime x1  Admitted to medicine for further work-up and management of dyspnea.  73M with hx of CAD s/p CABG in 2004, CHF with biV ICD, HTN, HLD, DM, EtOH use, recent ORIF on 4/29/23 s/p fall presenting from rehab for shortness of breath. Patient was working with physical therapy yesterday afternoon when he started noticing SOB. He tried to calm down by calling his son and relaxing, however, worsened overnight. SOB is worse on exertion and lying flat. Normally uses 2 pillows to sleep. His neighbor had AC turned on very strong and he's been feeling very cold at rehab. He also started having productive cough around the same time with generalized body aches. No chest pain, fevers, chills, sore throat, rhinorrhea. He is able to ambulate with a walker, denies any falls since OR.     VS: afebrile, HR 76, /70, RR 18, SpO2 94% on RA  Lab: Hgb 9.4, pro-danica 0.12, trop negative x2, pro-BNP 1621, RVP+ parainfluenza  Imaging: CXR clear lungs, CT chest RUL small airway opacities  Intervention: azithromycin/cefepime x1  Admitted to medicine for further work-up and management of dyspnea.

## 2023-05-26 NOTE — ED ADULT NURSE NOTE - DOES PATIENT HAVE ADVANCE DIRECTIVE
-- Message is from the [facility]--    Reason for Call: patient was leaving a message for Melida and wanted to know if she sent her referral and to call her back at  [phone]    Caller Information       Type Contact Phone    01/29/2019 04:13 PM Phone (Incoming) Sammy Mcclendon (Self) [phone] (H)          Alternative phone number: n/a    Turnaround time given to caller:   \"This message will be sent to [state Provider's name]. The clinical team will fulfill your request as soon as they review your message.\"     No

## 2023-05-26 NOTE — ED PROVIDER NOTE - CLINICAL SUMMARY MEDICAL DECISION MAKING FREE TEXT BOX
73 y M extensive PMH as above now at [facility] after ORIF for hip fx c 2 days cough, today with congestion and sore throat improved c PO dayquil/nyquil, now BIBEMS after c/o progressively worsening SOB and orthopnea.  Mildly dyspneic on exam, tolerating 2L O2, no gross e/o APE/CHF -- no jugular venous distension, LE swelling.  Lungs with bibasilar crackles and diffuse end expiratory wheeze noted most at bases/mid lung, good air entry b/l.  No h/o asthma.  No accessory muscle use/belly breathing.  upper respiratory tract infection with bronchospasm v PNA v congestive heart failure.  EKG nonischemic.  Doubt acute coronary syndrome but possible.  Doubt PE given sore throat/congestion/wheeze.  CXR, duoneb, labs, flu swab, reassess.  Pt currently on O2, will attempt to ween based on workup/clinical course.  --BMM

## 2023-05-26 NOTE — H&P ADULT - PATIENT'S PREFERRED PRONOUN
Patient has been instructed to follow-up with primary care provider her nurse practitioner and with the [facility] to get the results of the pill study.  
Him/He

## 2023-05-26 NOTE — ED PROVIDER NOTE - PROGRESS NOTE DETAILS
Karla Cole MD (PGY2): proBNP around 1500.  Patient intermittently requiring 2 L nasal cannula.  Was able to ambulate with sat low to 94 however once he got in the room he started having shortness of breath and desatted to 88  on room air.  Will get CT chest.  Likely CHF exacerbation but we will reassess.  Likely to be admitted. Karla Cole MD (PGY2): CT with right upper lobe patchy opacity.  will give cefepime and azithromycin for likely hospital-acquired pneumonia.  to be admitted.

## 2023-05-26 NOTE — ED PROVIDER NOTE - PHYSICAL EXAMINATION
General: Alert and Orientated x 3.  on 2 L nasal cannula  Head: Normocephalic and atraumatic.  Eyes: PERRLA with EOMI.  Neck: Supple. Trachea midline.   Cardiac: Normal S1 and S2 w/ RRR. No murmurs appreciated. No JVD appreciated.  Pulmonary: +  bilateral rails  Abdominal: Soft, non-tender. (+) bowel sounds appreciated in all 4 quadrants. No hepatosplenomegaly.   Neurologic: No focal sensory or motor deficits.  Musculoskeletal: Strength appropriate in all 4 extremities for age with no limited ROM.  Skin: Color appropriate for race. Intact, warm, and well-perfused.  Psychiatric: Appropriate mood and affect. No apparent risk to self or others.

## 2023-05-26 NOTE — H&P ADULT - NSHPPHYSICALEXAM_GEN_ALL_CORE
General: NAD, well-groomed, well-developed  Head: Atraumatic, Normocephalic  Eyes: EOMI, PERRLA, conjunctiva and sclera clear  ENMT: No tonsillar erythema, exudates, or enlargement; Moist mucous membranes, Good dentition, No lesions  Neck: Supple, No JVD, Normal thyroid  Lungs: Clear to percussion bilaterally; No rales, rhonchi, wheezing, or rubs  Heart: Regular rate and rhythm; No murmurs, rubs, or gallops  Abdomen: Soft, Nontender, Nondistended; Bowel sounds present  Extremities:  2+ Peripheral Pulses, No clubbing, cyanosis, or edema  Skin: No rashes or lesions  Neuro: AAOx3; Motor Strength 5/5 B/L upper and lower extremities; DTRs 2+ intact and symmetric General: NAD, well-groomed, well-developed  Head: atraumatic, normocephalic  Eyes: EOMI, PERRLA, sclera clear  ENMT: no tonsillar erythema/exudate, MMM  Neck: supple, no LAD, normal thyroid  Lungs: scattered wheezes bilaterally R>L, no rales  Heart: +s1/s2, regular rate and rhythm, no murmurs or gallops  Abdomen: soft, non-distended, non-tender, no rigidity or guarding  Extremities: no peripheral edema bilaterally, +2DP   Skin: no rash or lesions  Neuro: AAOx3, grossly normal strength in extremities

## 2023-05-26 NOTE — H&P ADULT - NSICDXPASTSURGICALHX_GEN_ALL_CORE_FT
PAST SURGICAL HISTORY:  S/P CABG (coronary artery bypass graft)     S/P ICD (internal cardiac defibrillator) procedure     Status post biventricular pacemaker

## 2023-05-26 NOTE — H&P ADULT - NSHPLABSRESULTS_GEN_ALL_CORE
GENERAL: NAD, well-groomed, well-developed  HEAD: Atraumatic, Normocephalic  EYES: EOMI, PERRLA, conjunctiva and sclera clear  ENMT: No tonsillar erythema, exudates, or enlargement; Moist mucous membranes, Good dentition, No lesions  NECK: Supple, No JVD, Normal thyroid  CHEST/LUNG: Clear to percussion bilaterally; No rales, rhonchi, wheezing, or rubs  HEART: Regular rate and rhythm; No murmurs, rubs, or gallops  ABDOMEN: Soft, Nontender, Nondistended; Bowel sounds present  EXTREMITIES:  2+ Peripheral Pulses, No clubbing, cyanosis, or edema  LYMPH: No lymphadenopathy noted  SKIN: No rashes or lesions  NERVOUS SYSTEM: AAOx3; Motor Strength 5/5 B/L upper and lower extremities; DTRs 2+ intact and symmetric LABS:                         9.4    6.55  )-----------( 181      ( 26 May 2023 00:43 )             30.1     05-26    139  |  103  |  11  ----------------------------<  93  4.0   |  25  |  1.00    Ca    9.5      26 May 2023 00:43    TPro  7.7  /  Alb  3.7  /  TBili  0.3  /  DBili  x   /  AST  24  /  ALT  20  /  AlkPhos  122<H>  05-26    RADIOLOGY, EKG & ADDITIONAL TESTS:

## 2023-05-27 LAB
ALBUMIN SERPL ELPH-MCNC: 3.9 G/DL — SIGNIFICANT CHANGE UP (ref 3.3–5)
ALP SERPL-CCNC: 114 U/L — SIGNIFICANT CHANGE UP (ref 40–120)
ALT FLD-CCNC: 18 U/L — SIGNIFICANT CHANGE UP (ref 10–45)
ANION GAP SERPL CALC-SCNC: 10 MMOL/L — SIGNIFICANT CHANGE UP (ref 5–17)
AST SERPL-CCNC: 24 U/L — SIGNIFICANT CHANGE UP (ref 10–40)
BILIRUB SERPL-MCNC: 0.3 MG/DL — SIGNIFICANT CHANGE UP (ref 0.2–1.2)
BUN SERPL-MCNC: 12 MG/DL — SIGNIFICANT CHANGE UP (ref 7–23)
CALCIUM SERPL-MCNC: 9.3 MG/DL — SIGNIFICANT CHANGE UP (ref 8.4–10.5)
CHLORIDE SERPL-SCNC: 101 MMOL/L — SIGNIFICANT CHANGE UP (ref 96–108)
CO2 SERPL-SCNC: 27 MMOL/L — SIGNIFICANT CHANGE UP (ref 22–31)
CREAT SERPL-MCNC: 0.97 MG/DL — SIGNIFICANT CHANGE UP (ref 0.5–1.3)
EGFR: 81 ML/MIN/1.73M2 — SIGNIFICANT CHANGE UP
GLUCOSE BLDC GLUCOMTR-MCNC: 118 MG/DL — HIGH (ref 70–99)
GLUCOSE BLDC GLUCOMTR-MCNC: 121 MG/DL — HIGH (ref 70–99)
GLUCOSE BLDC GLUCOMTR-MCNC: 99 MG/DL — SIGNIFICANT CHANGE UP (ref 70–99)
GLUCOSE SERPL-MCNC: 103 MG/DL — HIGH (ref 70–99)
HCT VFR BLD CALC: 31.7 % — LOW (ref 39–50)
HGB BLD-MCNC: 10 G/DL — LOW (ref 13–17)
MAGNESIUM SERPL-MCNC: 2 MG/DL — SIGNIFICANT CHANGE UP (ref 1.6–2.6)
MCHC RBC-ENTMCNC: 28.6 PG — SIGNIFICANT CHANGE UP (ref 27–34)
MCHC RBC-ENTMCNC: 31.5 GM/DL — LOW (ref 32–36)
MCV RBC AUTO: 90.6 FL — SIGNIFICANT CHANGE UP (ref 80–100)
MRSA PCR RESULT.: SIGNIFICANT CHANGE UP
NRBC # BLD: 0 /100 WBCS — SIGNIFICANT CHANGE UP (ref 0–0)
PHOSPHATE SERPL-MCNC: 3.3 MG/DL — SIGNIFICANT CHANGE UP (ref 2.5–4.5)
PLATELET # BLD AUTO: 192 K/UL — SIGNIFICANT CHANGE UP (ref 150–400)
POTASSIUM SERPL-MCNC: 3.8 MMOL/L — SIGNIFICANT CHANGE UP (ref 3.5–5.3)
POTASSIUM SERPL-SCNC: 3.8 MMOL/L — SIGNIFICANT CHANGE UP (ref 3.5–5.3)
PROT SERPL-MCNC: 7.6 G/DL — SIGNIFICANT CHANGE UP (ref 6–8.3)
RBC # BLD: 3.5 M/UL — LOW (ref 4.2–5.8)
RBC # FLD: 14.2 % — SIGNIFICANT CHANGE UP (ref 10.3–14.5)
S AUREUS DNA NOSE QL NAA+PROBE: SIGNIFICANT CHANGE UP
SODIUM SERPL-SCNC: 138 MMOL/L — SIGNIFICANT CHANGE UP (ref 135–145)
WBC # BLD: 5.73 K/UL — SIGNIFICANT CHANGE UP (ref 3.8–10.5)
WBC # FLD AUTO: 5.73 K/UL — SIGNIFICANT CHANGE UP (ref 3.8–10.5)

## 2023-05-27 PROCEDURE — 99232 SBSQ HOSP IP/OBS MODERATE 35: CPT

## 2023-05-27 RX ORDER — LOSARTAN POTASSIUM 100 MG/1
50 TABLET, FILM COATED ORAL DAILY
Refills: 0 | Status: DISCONTINUED | OUTPATIENT
Start: 2023-05-27 | End: 2023-05-29

## 2023-05-27 RX ORDER — HYDROXYZINE HCL 10 MG
25 TABLET ORAL ONCE
Refills: 0 | Status: COMPLETED | OUTPATIENT
Start: 2023-05-27 | End: 2023-05-27

## 2023-05-27 RX ORDER — POTASSIUM CHLORIDE 20 MEQ
40 PACKET (EA) ORAL ONCE
Refills: 0 | Status: COMPLETED | OUTPATIENT
Start: 2023-05-27 | End: 2023-05-27

## 2023-05-27 RX ORDER — HYDRALAZINE HCL 50 MG
5 TABLET ORAL ONCE
Refills: 0 | Status: DISCONTINUED | OUTPATIENT
Start: 2023-05-27 | End: 2023-05-29

## 2023-05-27 RX ORDER — HYDRALAZINE HCL 50 MG
5 TABLET ORAL ONCE
Refills: 0 | Status: COMPLETED | OUTPATIENT
Start: 2023-05-27 | End: 2023-05-27

## 2023-05-27 RX ORDER — ACETAMINOPHEN 500 MG
650 TABLET ORAL ONCE
Refills: 0 | Status: COMPLETED | OUTPATIENT
Start: 2023-05-27 | End: 2023-05-27

## 2023-05-27 RX ORDER — AMLODIPINE BESYLATE 2.5 MG/1
5 TABLET ORAL DAILY
Refills: 0 | Status: DISCONTINUED | OUTPATIENT
Start: 2023-05-27 | End: 2023-06-01

## 2023-05-27 RX ORDER — ACETAMINOPHEN 500 MG
650 TABLET ORAL EVERY 6 HOURS
Refills: 0 | Status: DISCONTINUED | OUTPATIENT
Start: 2023-05-27 | End: 2023-06-01

## 2023-05-27 RX ADMIN — LOSARTAN POTASSIUM 50 MILLIGRAM(S): 100 TABLET, FILM COATED ORAL at 05:42

## 2023-05-27 RX ADMIN — Medication 3 MILLILITER(S): at 04:48

## 2023-05-27 RX ADMIN — LATANOPROST 1 DROP(S): 0.05 SOLUTION/ DROPS OPHTHALMIC; TOPICAL at 00:38

## 2023-05-27 RX ADMIN — Medication 650 MILLIGRAM(S): at 06:48

## 2023-05-27 RX ADMIN — Medication 100 MILLIGRAM(S): at 21:08

## 2023-05-27 RX ADMIN — Medication 40 MILLIGRAM(S): at 06:44

## 2023-05-27 RX ADMIN — AZITHROMYCIN 500 MILLIGRAM(S): 500 TABLET, FILM COATED ORAL at 11:45

## 2023-05-27 RX ADMIN — Medication 5 MILLIGRAM(S): at 15:54

## 2023-05-27 RX ADMIN — SENNA PLUS 2 TABLET(S): 8.6 TABLET ORAL at 21:08

## 2023-05-27 RX ADMIN — GABAPENTIN 300 MILLIGRAM(S): 400 CAPSULE ORAL at 17:12

## 2023-05-27 RX ADMIN — ENOXAPARIN SODIUM 40 MILLIGRAM(S): 100 INJECTION SUBCUTANEOUS at 15:55

## 2023-05-27 RX ADMIN — Medication 650 MILLIGRAM(S): at 17:21

## 2023-05-27 RX ADMIN — Medication 650 MILLIGRAM(S): at 00:37

## 2023-05-27 RX ADMIN — Medication 100 MILLIGRAM(S): at 16:25

## 2023-05-27 RX ADMIN — Medication 25 MILLIGRAM(S): at 15:55

## 2023-05-27 RX ADMIN — ATORVASTATIN CALCIUM 80 MILLIGRAM(S): 80 TABLET, FILM COATED ORAL at 21:08

## 2023-05-27 RX ADMIN — POLYETHYLENE GLYCOL 3350 17 GRAM(S): 17 POWDER, FOR SOLUTION ORAL at 05:42

## 2023-05-27 RX ADMIN — Medication 200 MILLIGRAM(S): at 17:12

## 2023-05-27 RX ADMIN — GABAPENTIN 300 MILLIGRAM(S): 400 CAPSULE ORAL at 05:42

## 2023-05-27 RX ADMIN — LOSARTAN POTASSIUM 50 MILLIGRAM(S): 100 TABLET, FILM COATED ORAL at 12:18

## 2023-05-27 RX ADMIN — Medication 650 MILLIGRAM(S): at 21:08

## 2023-05-27 RX ADMIN — LATANOPROST 1 DROP(S): 0.05 SOLUTION/ DROPS OPHTHALMIC; TOPICAL at 23:40

## 2023-05-27 RX ADMIN — Medication 650 MILLIGRAM(S): at 04:32

## 2023-05-27 RX ADMIN — Medication 3 MILLILITER(S): at 11:45

## 2023-05-27 RX ADMIN — Medication 81 MILLIGRAM(S): at 11:45

## 2023-05-27 RX ADMIN — Medication 40 MILLIEQUIVALENT(S): at 12:52

## 2023-05-27 RX ADMIN — Medication 3 MILLILITER(S): at 17:12

## 2023-05-27 RX ADMIN — Medication 200 MILLIGRAM(S): at 13:16

## 2023-05-27 RX ADMIN — AMLODIPINE BESYLATE 5 MILLIGRAM(S): 2.5 TABLET ORAL at 17:12

## 2023-05-27 RX ADMIN — CARVEDILOL PHOSPHATE 25 MILLIGRAM(S): 80 CAPSULE, EXTENDED RELEASE ORAL at 17:12

## 2023-05-27 RX ADMIN — CARVEDILOL PHOSPHATE 25 MILLIGRAM(S): 80 CAPSULE, EXTENDED RELEASE ORAL at 05:42

## 2023-05-27 RX ADMIN — Medication 650 MILLIGRAM(S): at 22:41

## 2023-05-27 RX ADMIN — Medication 200 MILLIGRAM(S): at 21:07

## 2023-05-27 NOTE — PHYSICAL THERAPY INITIAL EVALUATION ADULT - TRANSFER TRAINING, PT EVAL
Pt will transfer independenly with RW or least restrictive AD in 2 weeks. GOAL: Pt will perform sit<>stand transfer independently with least restrictive device in 2wks. .

## 2023-05-27 NOTE — PHYSICAL THERAPY INITIAL EVALUATION ADULT - NAME OF DISCHARGE PLANNER
CARE TEAM INDICATED THAT PT IS MEDICALLY STABLE TO DISHCARGE HOME THIS DAY. PT
TO DC HOME TO SELF CARE. NO OTHER CM INTERVENTION INDICATED. PT HAD TRANSPORT
HOME. NO OTHER CM INTERVENTION INDICATED. CASE CLOSED. sent email to CM VIA TEAMS

## 2023-05-27 NOTE — PHYSICAL THERAPY INITIAL EVALUATION ADULT - PLANNED THERAPY INTERVENTIONS, PT EVAL
balance training/bed mobility training/gait training/strengthening/transfer training balance training/gait training/strengthening/transfer training

## 2023-05-27 NOTE — PHYSICAL THERAPY INITIAL EVALUATION ADULT - PERTINENT HX OF CURRENT PROBLEM, REHAB EVAL
73-year-old male with history of CAD s/p CABG, PPM w/ recent lead replacement, HTN, DM, HLD, ETOH use ,s/p ORIF LLE,  currently at subacute rehab facility presenting to the emergency department with shortness of breath, congestion and sore throat.No chest pain.Cough is somewhat productive.

## 2023-05-27 NOTE — PHYSICAL THERAPY INITIAL EVALUATION ADULT - ADDITIONAL COMMENTS
prior to admission Pt was at short term rehab . As per past PT note recent, Patient lives in a private home with his wife. Pt's son is with his father 80% of the time. He notes that prior to admission pt was independent with all ADL's and did not use an assistive device to ambulate. Pt has 7 steps to enter home. prior to admission Pt was at short term rehab . As per past PT note recent, Patient lives in a private home with his wife. pt was independent with all ADL's and did not use an assistive device to ambulate. Pt has 7 steps to enter home.

## 2023-05-27 NOTE — PROGRESS NOTE ADULT - PROBLEM SELECTOR PLAN 2
- RVP+ parainfluenza  - no leukocytosis, mild pro-danica elevation  - CXR clear lungs  - CT chest: RUL small airway opacities  - likely viral process, unlikely superimposed bacterial infxn  > continue azithromycin until legionella   > check MRSA swab, urine and blood cultures - RVP+ parainfluenza, MRSA negative  - no leukocytosis, mild pro-danica elevation  - CXR clear lungs  - CT chest: RUL small airway opacities  - likely viral process, unlikely superimposed bacterial infxn  > cough control with robitussin & tessalon perle  > continue azithromycin until legionella   > f/u urine and blood cultures

## 2023-05-27 NOTE — PROVIDER CONTACT NOTE (OTHER) - RECOMMENDATIONS
Provider made aware, pt was educated on risk and benefit
notify provider, give hydralazine, hroxyzine, and redo vitals in 15-30 minutes.

## 2023-05-27 NOTE — PHYSICAL THERAPY INITIAL EVALUATION ADULT - TRANSFER SAFETY CONCERNS NOTED: SIT/STAND, REHAB EVAL
"Digital Medicine: Clinician Follow-Up    Buffy Dominique submitted a reading of 157/111 at 2/9/2020  5:33 PM. Patient reports that she is feeling fine. She denies any s/sx of hypertension with the elevated reading - no CP. SOB, blurred vision or severe headache. "I had a lot going on that day and I think I had eaten from [employer] or something when I took that reading." She also reports that she has not charged her device in a while and she is in need of a new prescription for amlodipine-olmesartan (PATTIE).    The history is provided by the patient. No  was used.     Follow Up  Follow-up reason(s): reading review      Alert received.   Care Team received high BP alert.  Patient is not experiencing symptoms.  Reading was invalid because Device has not been charged in a while and patient has just eaten      INTERVENTION(S)  encouragement/support    PLAN  patient verbalizes understanding and continue monitoring    Current 30-day BP avg of 139/72 is near goal of <130/80.  Reviewed readings: SBP/DBP is trending downwards where DBP is usually well-controlled and SBP is occasionally controlled.     Continue current regimen.  Advised patient to charge BP device.  Continue to monitor    Follow-up in 8 weeks or sooner if needed.        There are no preventive care reminders to display for this patient.    Last 5 Patient Entered Readings                                      Current 30 Day Average: 139/72     Recent Readings 2/9/2020 2/9/2020 2/9/2020 2/7/2020 2/6/2020    SBP (mmHg) 151 84 157 130 109    DBP (mmHg) 66 68 111 45 57    Pulse 49 141 114 46 48             Hypertension Medications             amlodipine-olmesartan (PATTIE) 10-40 mg per tablet Take 1 tablet by mouth once daily.    hydroCHLOROthiazide (MICROZIDE) 12.5 mg capsule TAKE 2 CAPSULES(25 MG) BY MOUTH EVERY DAY    nebivolol (BYSTOLIC) 20 mg Tab Take 1 tablet by mouth once daily.                 Screenings  " losing balance/decreased weight-shifting ability

## 2023-05-27 NOTE — PROVIDER CONTACT NOTE (OTHER) - ACTION/TREATMENT ORDERED:
Provider made aware and will speak to patient
Provider notified, medication given, will redo vitals in 30 minutes.

## 2023-05-27 NOTE — PHYSICAL THERAPY INITIAL EVALUATION ADULT - CRITERIA FOR SKILLED THERAPEUTIC INTERVENTIONS
impairments found/functional limitations in following categories/risk reduction/prevention/rehab potential/therapy frequency/predicted duration of therapy intervention/anticipated equipment needs at discharge/anticipated discharge recommendation impairments found

## 2023-05-27 NOTE — PHYSICAL THERAPY INITIAL EVALUATION ADULT - IMPAIRMENTS FOUND, PT EVAL
aerobic capacity/endurance/arousal, attention, and cognition/gait, locomotion, and balance/muscle strength aerobic capacity/endurance/gait, locomotion, and balance/muscle strength

## 2023-05-27 NOTE — PROGRESS NOTE ADULT - SUBJECTIVE AND OBJECTIVE BOX
***************************************************************  Raina Gongora, PGY 1  Internal Medicine   pager: [phone]  ***************************************************************    NAOMI MORRISSEY  MRN: [id number]    Patient is a 73y old Male who presents with a chief complaint of Shortness of Breath (26 May 2023 12:40)    Subjective: No acute events overnight.      MEDICATIONS  (STANDING):  albuterol/ipratropium for Nebulization 3 milliLiter(s) Nebulizer every 6 hours  aspirin  chewable 81 milliGRAM(s) Oral daily  atorvastatin 80 milliGRAM(s) Oral at bedtime  azithromycin   Tablet 500 milliGRAM(s) Oral daily  carvedilol 25 milliGRAM(s) Oral every 12 hours  dextrose 5%. 1000 milliLiter(s) (100 mL/Hr) IV Continuous <Continuous>  dextrose 5%. 1000 milliLiter(s) (50 mL/Hr) IV Continuous <Continuous>  dextrose 50% Injectable 25 Gram(s) IV Push once  dextrose 50% Injectable 25 Gram(s) IV Push once  dextrose 50% Injectable 12.5 Gram(s) IV Push once  enoxaparin Injectable 40 milliGRAM(s) SubCutaneous every 24 hours  furosemide   Injectable 40 milliGRAM(s) IV Push daily  gabapentin 300 milliGRAM(s) Oral two times a day  glucagon  Injectable 1 milliGRAM(s) IntraMuscular once  insulin lispro (ADMELOG) corrective regimen sliding scale   SubCutaneous at bedtime  insulin lispro (ADMELOG) corrective regimen sliding scale   SubCutaneous three times a day before meals  latanoprost 0.005% Ophthalmic Solution 1 Drop(s) Both EYES at bedtime  losartan 50 milliGRAM(s) Oral daily  polyethylene glycol 3350 17 Gram(s) Oral two times a day  senna 2 Tablet(s) Oral at bedtime  traZODone 100 milliGRAM(s) Oral at bedtime    MEDICATIONS  (PRN):  dextrose Oral Gel 15 Gram(s) Oral once PRN Blood Glucose LESS THAN 70 milliGRAM(s)/deciliter    Objective:  Vitals: Vital Signs Last 24 Hrs  T(C): 37.1 (05-27-23 @ 05:45), Max: 38.2 (05-26-23 @ 21:50)  T(F): 98.8 (05-27-23 @ 05:45), Max: 100.7 (05-26-23 @ 21:50)  HR: 96 (05-27-23 @ 05:45) (90 - 98)  BP: 164/70 (05-27-23 @ 05:45) (132/65 - 183/69)  RR: 20 (05-27-23 @ 05:45) (20 - 22)  SpO2: 100% (05-27-23 @ 05:45) (98% - 100%)               I&O's Summary    26 May 2023 07:01  -  27 May 2023 07:00  --------------------------------------------------------  IN: 240 mL / OUT: 0 mL / NET: 240 mL      PHYSICAL EXAM:  General: NAD, resting in bed, on NC  Lungs: clear to auscultation in anterior lung fields, no wheezes/rhonchi/rales  Heart: +s1/s2, RRR, no murmurs/gallops/rubs  Abdomen: soft, non-distended, non-tender to palpation, no rebound/guarding/rigidity, bowel sounds present  Extremities: no peripheral edema bilaterally    LABS:  05-27    138  |  101  |  12  ----------------------------<  103<H>  3.8   |  27  |  0.97  05-26    139  |  103  |  11  ----------------------------<  93  4.0   |  25  |  1.00    Ca    9.3      27 May 2023 06:45  Ca    9.5      26 May 2023 00:43  Phos  3.3     05-27  Mg     2.0     05-27    TPro  7.6  /  Alb  3.9  /  TBili  0.3  /  DBili  x   /  AST  24  /  ALT  18  /  AlkPhos  114  05-27  TPro  7.7  /  Alb  3.7  /  TBili  0.3  /  DBili  x   /  AST  24  /  ALT  20  /  AlkPhos  122<H>  05-26               10.0   5.73  )-----------( 192      ( 27 May 2023 06:45 )             31.7                         9.4    6.55  )-----------( 181      ( 26 May 2023 00:43 )             30.1     CAPILLARY BLOOD GLUCOSE  POCT Blood Glucose.: 105 mg/dL (26 May 2023 21:54)  POCT Blood Glucose.: 143 mg/dL (26 May 2023 17:00)    RADIOLOGY & ADDITIONAL TESTS:    Imaging Personally Reviewed:  [x] YES  [ ] NO  Consultants involved in case:   Consultant(s) Notes Reviewed:  [x] YES  [ ] NO:   Care Discussed with Consultants/Other Providers [x] YES  [ ] NO ***************************************************************  Raina Fransisca, PGY 1  Internal Medicine   pager: [phone]  ***************************************************************    NAOMI MORRISSEY  MRN: [id number]    Patient is a 73y old Male who presents with a chief complaint of Shortness of Breath (26 May 2023 12:40)    Subjective: No acute events overnight. Still has shortness of breath and excessive coughing.       MEDICATIONS  (STANDING):  albuterol/ipratropium for Nebulization 3 milliLiter(s) Nebulizer every 6 hours  aspirin  chewable 81 milliGRAM(s) Oral daily  atorvastatin 80 milliGRAM(s) Oral at bedtime  azithromycin   Tablet 500 milliGRAM(s) Oral daily  carvedilol 25 milliGRAM(s) Oral every 12 hours  dextrose 5%. 1000 milliLiter(s) (100 mL/Hr) IV Continuous <Continuous>  dextrose 5%. 1000 milliLiter(s) (50 mL/Hr) IV Continuous <Continuous>  dextrose 50% Injectable 25 Gram(s) IV Push once  dextrose 50% Injectable 25 Gram(s) IV Push once  dextrose 50% Injectable 12.5 Gram(s) IV Push once  enoxaparin Injectable 40 milliGRAM(s) SubCutaneous every 24 hours  furosemide   Injectable 40 milliGRAM(s) IV Push daily  gabapentin 300 milliGRAM(s) Oral two times a day  glucagon  Injectable 1 milliGRAM(s) IntraMuscular once  insulin lispro (ADMELOG) corrective regimen sliding scale   SubCutaneous at bedtime  insulin lispro (ADMELOG) corrective regimen sliding scale   SubCutaneous three times a day before meals  latanoprost 0.005% Ophthalmic Solution 1 Drop(s) Both EYES at bedtime  losartan 50 milliGRAM(s) Oral daily  polyethylene glycol 3350 17 Gram(s) Oral two times a day  senna 2 Tablet(s) Oral at bedtime  traZODone 100 milliGRAM(s) Oral at bedtime    MEDICATIONS  (PRN):  dextrose Oral Gel 15 Gram(s) Oral once PRN Blood Glucose LESS THAN 70 milliGRAM(s)/deciliter    Objective:  Vitals: Vital Signs Last 24 Hrs  T(C): 37.1 (05-27-23 @ 05:45), Max: 38.2 (05-26-23 @ 21:50)  T(F): 98.8 (05-27-23 @ 05:45), Max: 100.7 (05-26-23 @ 21:50)  HR: 96 (05-27-23 @ 05:45) (90 - 98)  BP: 164/70 (05-27-23 @ 05:45) (132/65 - 183/69)  RR: 20 (05-27-23 @ 05:45) (20 - 22)  SpO2: 100% (05-27-23 @ 05:45) (98% - 100%)               I&O's Summary    26 May 2023 07:01  -  27 May 2023 07:00  --------------------------------------------------------  IN: 240 mL / OUT: 0 mL / NET: 240 mL      PHYSICAL EXAM:  General: NAD, resting in bed, on NC  Lungs: clear to auscultation in anterior lung fields, no wheezes  Heart: +s1/s2, RRR, no murmurs or gallops  Abdomen: soft, non-distended, non-tender to palpation, no rigidity  Extremities: no peripheral edema bilaterally    LABS:  05-27    138  |  101  |  12  ----------------------------<  103<H>  3.8   |  27  |  0.97  05-26    139  |  103  |  11  ----------------------------<  93  4.0   |  25  |  1.00    Ca    9.3      27 May 2023 06:45  Ca    9.5      26 May 2023 00:43  Phos  3.3     05-27  Mg     2.0     05-27    TPro  7.6  /  Alb  3.9  /  TBili  0.3  /  DBili  x   /  AST  24  /  ALT  18  /  AlkPhos  114  05-27  TPro  7.7  /  Alb  3.7  /  TBili  0.3  /  DBili  x   /  AST  24  /  ALT  20  /  AlkPhos  122<H>  05-26               10.0   5.73  )-----------( 192      ( 27 May 2023 06:45 )             31.7                         9.4    6.55  )-----------( 181      ( 26 May 2023 00:43 )             30.1     CAPILLARY BLOOD GLUCOSE  POCT Blood Glucose.: 105 mg/dL (26 May 2023 21:54)  POCT Blood Glucose.: 143 mg/dL (26 May 2023 17:00)    RADIOLOGY & ADDITIONAL TESTS:    Imaging Personally Reviewed:  [x] YES  [ ] NO  Consultants involved in case:   Consultant(s) Notes Reviewed:  [x] YES  [ ] NO:   Care Discussed with Consultants/Other Providers [x] YES  [ ] NO ***************************************************************  Raina Gongora, PGY 1  Internal Medicine   pager: [phone]  ***************************************************************    NAOMI MORRISSEY  MRN: [id number]    Patient is a 73y old Male who presents with a chief complaint of Shortness of Breath (26 May 2023 12:40)    Subjective: Febrile to 100.7. Still has shortness of breath and excessive coughing.       MEDICATIONS  (STANDING):  albuterol/ipratropium for Nebulization 3 milliLiter(s) Nebulizer every 6 hours  aspirin  chewable 81 milliGRAM(s) Oral daily  atorvastatin 80 milliGRAM(s) Oral at bedtime  azithromycin   Tablet 500 milliGRAM(s) Oral daily  carvedilol 25 milliGRAM(s) Oral every 12 hours  dextrose 5%. 1000 milliLiter(s) (100 mL/Hr) IV Continuous <Continuous>  dextrose 5%. 1000 milliLiter(s) (50 mL/Hr) IV Continuous <Continuous>  dextrose 50% Injectable 25 Gram(s) IV Push once  dextrose 50% Injectable 25 Gram(s) IV Push once  dextrose 50% Injectable 12.5 Gram(s) IV Push once  enoxaparin Injectable 40 milliGRAM(s) SubCutaneous every 24 hours  furosemide   Injectable 40 milliGRAM(s) IV Push daily  gabapentin 300 milliGRAM(s) Oral two times a day  glucagon  Injectable 1 milliGRAM(s) IntraMuscular once  insulin lispro (ADMELOG) corrective regimen sliding scale   SubCutaneous at bedtime  insulin lispro (ADMELOG) corrective regimen sliding scale   SubCutaneous three times a day before meals  latanoprost 0.005% Ophthalmic Solution 1 Drop(s) Both EYES at bedtime  losartan 50 milliGRAM(s) Oral daily  polyethylene glycol 3350 17 Gram(s) Oral two times a day  senna 2 Tablet(s) Oral at bedtime  traZODone 100 milliGRAM(s) Oral at bedtime    MEDICATIONS  (PRN):  dextrose Oral Gel 15 Gram(s) Oral once PRN Blood Glucose LESS THAN 70 milliGRAM(s)/deciliter    Objective:  Vitals: Vital Signs Last 24 Hrs  T(C): 37.1 (05-27-23 @ 05:45), Max: 38.2 (05-26-23 @ 21:50)  T(F): 98.8 (05-27-23 @ 05:45), Max: 100.7 (05-26-23 @ 21:50)  HR: 96 (05-27-23 @ 05:45) (90 - 98)  BP: 164/70 (05-27-23 @ 05:45) (132/65 - 183/69)  RR: 20 (05-27-23 @ 05:45) (20 - 22)  SpO2: 100% (05-27-23 @ 05:45) (98% - 100%)               I&O's Summary    26 May 2023 07:01  -  27 May 2023 07:00  --------------------------------------------------------  IN: 240 mL / OUT: 0 mL / NET: 240 mL      PHYSICAL EXAM:  General: NAD, resting in bed, on NC  Lungs: clear to auscultation in anterior lung fields, no wheezes  Heart: +s1/s2, RRR, no murmurs or gallops  Abdomen: soft, non-distended, non-tender to palpation, no rigidity  Extremities: no peripheral edema bilaterally    LABS:  05-27    138  |  101  |  12  ----------------------------<  103<H>  3.8   |  27  |  0.97  05-26    139  |  103  |  11  ----------------------------<  93  4.0   |  25  |  1.00    Ca    9.3      27 May 2023 06:45  Ca    9.5      26 May 2023 00:43  Phos  3.3     05-27  Mg     2.0     05-27    TPro  7.6  /  Alb  3.9  /  TBili  0.3  /  DBili  x   /  AST  24  /  ALT  18  /  AlkPhos  114  05-27  TPro  7.7  /  Alb  3.7  /  TBili  0.3  /  DBili  x   /  AST  24  /  ALT  20  /  AlkPhos  122<H>  05-26               10.0   5.73  )-----------( 192      ( 27 May 2023 06:45 )             31.7                         9.4    6.55  )-----------( 181      ( 26 May 2023 00:43 )             30.1     CAPILLARY BLOOD GLUCOSE  POCT Blood Glucose.: 105 mg/dL (26 May 2023 21:54)  POCT Blood Glucose.: 143 mg/dL (26 May 2023 17:00)    RADIOLOGY & ADDITIONAL TESTS:    Imaging Personally Reviewed:  [x] YES  [ ] NO  Consultants involved in case:   Consultant(s) Notes Reviewed:  [x] YES  [ ] NO:   Care Discussed with Consultants/Other Providers [x] YES  [ ] NO

## 2023-05-27 NOTE — PHYSICAL THERAPY INITIAL EVALUATION ADULT - BALANCE TRAINING, PT EVAL
normal balance for safe upright activity in 2 weeks GOAL: Pt will increase static/dynamic standing balance by 1/2 grade in 2wks to decrease fall risk and increase independence with ADLs

## 2023-05-27 NOTE — PHYSICAL THERAPY INITIAL EVALUATION ADULT - GAIT TRAINING, PT EVAL
Pt will ambulate 200-300 ft independently with RW in 2 weeks GOAL:Pt will ambulate 250ft with least restrictive device independently in 2wks.

## 2023-05-27 NOTE — PROVIDER CONTACT NOTE (OTHER) - ASSESSMENT
Alert and orientedx4, BP elevated, Losartan given, Coreg given, denies discomfort.
pt agitated, pt stable at this moment

## 2023-05-27 NOTE — PHYSICAL THERAPY INITIAL EVALUATION ADULT - STRENGTHENING, PT EVAL
Half grade increase in muscle strength x 4 extremities to improve functional mobility GOAL: Pt will improve BUE/BLE strength by 1/2 grade in 2wks to improve overall functional mobility

## 2023-05-28 LAB
GLUCOSE BLDC GLUCOMTR-MCNC: 114 MG/DL — HIGH (ref 70–99)
GLUCOSE BLDC GLUCOMTR-MCNC: 114 MG/DL — HIGH (ref 70–99)
GLUCOSE BLDC GLUCOMTR-MCNC: 116 MG/DL — HIGH (ref 70–99)
GLUCOSE BLDC GLUCOMTR-MCNC: 139 MG/DL — HIGH (ref 70–99)

## 2023-05-28 PROCEDURE — 99232 SBSQ HOSP IP/OBS MODERATE 35: CPT

## 2023-05-28 RX ORDER — FUROSEMIDE 40 MG
40 TABLET ORAL DAILY
Refills: 0 | Status: DISCONTINUED | OUTPATIENT
Start: 2023-05-29 | End: 2023-05-29

## 2023-05-28 RX ADMIN — Medication 200 MILLIGRAM(S): at 17:48

## 2023-05-28 RX ADMIN — Medication 100 MILLIGRAM(S): at 21:01

## 2023-05-28 RX ADMIN — SENNA PLUS 2 TABLET(S): 8.6 TABLET ORAL at 21:01

## 2023-05-28 RX ADMIN — Medication 200 MILLIGRAM(S): at 11:17

## 2023-05-28 RX ADMIN — Medication 0: at 21:02

## 2023-05-28 RX ADMIN — Medication 200 MILLIGRAM(S): at 06:05

## 2023-05-28 RX ADMIN — GABAPENTIN 300 MILLIGRAM(S): 400 CAPSULE ORAL at 06:04

## 2023-05-28 RX ADMIN — Medication 3 MILLILITER(S): at 06:05

## 2023-05-28 RX ADMIN — CARVEDILOL PHOSPHATE 25 MILLIGRAM(S): 80 CAPSULE, EXTENDED RELEASE ORAL at 17:48

## 2023-05-28 RX ADMIN — AZITHROMYCIN 500 MILLIGRAM(S): 500 TABLET, FILM COATED ORAL at 11:35

## 2023-05-28 RX ADMIN — GABAPENTIN 300 MILLIGRAM(S): 400 CAPSULE ORAL at 17:48

## 2023-05-28 RX ADMIN — AMLODIPINE BESYLATE 5 MILLIGRAM(S): 2.5 TABLET ORAL at 06:05

## 2023-05-28 RX ADMIN — Medication 81 MILLIGRAM(S): at 11:16

## 2023-05-28 RX ADMIN — Medication 650 MILLIGRAM(S): at 03:10

## 2023-05-28 RX ADMIN — ATORVASTATIN CALCIUM 80 MILLIGRAM(S): 80 TABLET, FILM COATED ORAL at 21:02

## 2023-05-28 RX ADMIN — POLYETHYLENE GLYCOL 3350 17 GRAM(S): 17 POWDER, FOR SOLUTION ORAL at 06:05

## 2023-05-28 RX ADMIN — Medication 3 MILLILITER(S): at 00:16

## 2023-05-28 RX ADMIN — Medication 3 MILLILITER(S): at 17:48

## 2023-05-28 RX ADMIN — LOSARTAN POTASSIUM 50 MILLIGRAM(S): 100 TABLET, FILM COATED ORAL at 06:05

## 2023-05-28 RX ADMIN — CARVEDILOL PHOSPHATE 25 MILLIGRAM(S): 80 CAPSULE, EXTENDED RELEASE ORAL at 06:05

## 2023-05-28 RX ADMIN — LATANOPROST 1 DROP(S): 0.05 SOLUTION/ DROPS OPHTHALMIC; TOPICAL at 21:00

## 2023-05-28 RX ADMIN — Medication 40 MILLIGRAM(S): at 06:06

## 2023-05-28 RX ADMIN — Medication 650 MILLIGRAM(S): at 11:30

## 2023-05-28 RX ADMIN — ENOXAPARIN SODIUM 40 MILLIGRAM(S): 100 INJECTION SUBCUTANEOUS at 17:48

## 2023-05-28 RX ADMIN — Medication 3 MILLILITER(S): at 11:17

## 2023-05-28 NOTE — PROGRESS NOTE ADULT - SUBJECTIVE AND OBJECTIVE BOX
Pawan Subramanian MD  PGY-3 Department of Internal Medicine  Available on [employer] Teams      Patient is a 73y old  Male who presents with a chief complaint of Shortness of Breath (27 May 2023 07:50)      OVERNIGHT EVENTS: No acute overnight events.    SUBJECTIVE: Pt seen and examined. Denies fevers, chills, CP, SOB, Abdominal pain, N/V, Constipation, Diarrhea    MEDICATIONS  (STANDING):  albuterol/ipratropium for Nebulization 3 milliLiter(s) Nebulizer every 6 hours  amLODIPine   Tablet 5 milliGRAM(s) Oral daily  aspirin  chewable 81 milliGRAM(s) Oral daily  atorvastatin 80 milliGRAM(s) Oral at bedtime  azithromycin   Tablet 500 milliGRAM(s) Oral daily  carvedilol 25 milliGRAM(s) Oral every 12 hours  dextrose 5%. 1000 milliLiter(s) (100 mL/Hr) IV Continuous <Continuous>  dextrose 5%. 1000 milliLiter(s) (50 mL/Hr) IV Continuous <Continuous>  dextrose 50% Injectable 12.5 Gram(s) IV Push once  dextrose 50% Injectable 25 Gram(s) IV Push once  dextrose 50% Injectable 25 Gram(s) IV Push once  enoxaparin Injectable 40 milliGRAM(s) SubCutaneous every 24 hours  furosemide   Injectable 40 milliGRAM(s) IV Push daily  gabapentin 300 milliGRAM(s) Oral two times a day  glucagon  Injectable 1 milliGRAM(s) IntraMuscular once  guaiFENesin Oral Liquid (Sugar-Free) 200 milliGRAM(s) Oral every 6 hours  hydrALAZINE Injectable 5 milliGRAM(s) IV Push once  insulin lispro (ADMELOG) corrective regimen sliding scale   SubCutaneous three times a day before meals  insulin lispro (ADMELOG) corrective regimen sliding scale   SubCutaneous at bedtime  latanoprost 0.005% Ophthalmic Solution 1 Drop(s) Both EYES at bedtime  losartan 50 milliGRAM(s) Oral daily  polyethylene glycol 3350 17 Gram(s) Oral two times a day  senna 2 Tablet(s) Oral at bedtime  traZODone 100 milliGRAM(s) Oral at bedtime    MEDICATIONS  (PRN):  acetaminophen     Tablet .. 650 milliGRAM(s) Oral every 6 hours PRN Temp greater or equal to 38C (100.4F), Mild Pain (1 - 3)  benzonatate 100 milliGRAM(s) Oral three times a day PRN Cough  dextrose Oral Gel 15 Gram(s) Oral once PRN Blood Glucose LESS THAN 70 milliGRAM(s)/deciliter      I&O's Summary      Vital Signs Last 24 Hrs  T(C): 37.4 (28 May 2023 04:42), Max: 37.4 (28 May 2023 04:42)  T(F): 99.4 (28 May 2023 04:42), Max: 99.4 (28 May 2023 04:42)  HR: 103 (28 May 2023 04:42) (81 - 103)  BP: 122/57 (28 May 2023 04:42) (122/57 - 191/98)  BP(mean): --  RR: 18 (28 May 2023 04:42) (18 - 18)  SpO2: 100% (28 May 2023 04:42) (95% - 100%)    Parameters below as of 28 May 2023 04:42  Patient On (Oxygen Delivery Method): nasal cannula  O2 Flow (L/min): 2      =================PHYSICAL EXAM=================    GENERAL: Laying comfortably, NAD  EYES: EOMI, PERRL, no scleral icterus  NECK: No JVD  LUNG: Clear to auscultation bilaterally; No wheeze, crackles or rhonci  HEART: Regular rate and rhythm; No murmurs, rubs, or gallops  ABDOMEN: Soft, Nontender, Nondistended  EXTREMITIES:  No LE edema, 2+ Peripheral Pulses, No clubbing, cyanosis, or edema  PSYCH: AAOx3  NEUROLOGY: non-focal, strength 5/5 in all extremities, sensation intact  SKIN: No rashes or lesions    =================================================    LABS:                        10.0   5.73  )-----------( 192      ( 27 May 2023 06:45 )             31.7     Auto Eosinophil # x     / Auto Eosinophil % x     / Auto Neutrophil # x     / Auto Neutrophil % x     / BANDS % x        05-27    138  |  101  |  12  ----------------------------<  103<H>  3.8   |  27  |  0.97    Ca    9.3      27 May 2023 06:45  Mg     2.0     05-27  Phos  3.3     05-27  TPro  7.6  /  Alb  3.9  /  TBili  0.3  /  DBili  x   /  AST  24  /  ALT  18  /  AlkPhos  114  05-27                  RADIOLOGY & ADDITIONAL TESTS:    Imaging Personally Reviewed:    Consultant(s) Notes Reviewed:      Care Discussed with Consultants/Other Providers:   Pawan Subramanian MD  PGY-3 Department of Internal Medicine  Available on [employer] Teams      Patient is a 73y old  Male who presents with a chief complaint of Shortness of Breath (27 May 2023 07:50)      OVERNIGHT EVENTS: No acute overnight events.    SUBJECTIVE: Pt seen and examined. States breathing is better. Denies fevers, chills, CP, SOB, Abdominal pain, N/V, Constipation, Diarrhea    MEDICATIONS  (STANDING):  albuterol/ipratropium for Nebulization 3 milliLiter(s) Nebulizer every 6 hours  amLODIPine   Tablet 5 milliGRAM(s) Oral daily  aspirin  chewable 81 milliGRAM(s) Oral daily  atorvastatin 80 milliGRAM(s) Oral at bedtime  azithromycin   Tablet 500 milliGRAM(s) Oral daily  carvedilol 25 milliGRAM(s) Oral every 12 hours  dextrose 5%. 1000 milliLiter(s) (100 mL/Hr) IV Continuous <Continuous>  dextrose 5%. 1000 milliLiter(s) (50 mL/Hr) IV Continuous <Continuous>  dextrose 50% Injectable 12.5 Gram(s) IV Push once  dextrose 50% Injectable 25 Gram(s) IV Push once  dextrose 50% Injectable 25 Gram(s) IV Push once  enoxaparin Injectable 40 milliGRAM(s) SubCutaneous every 24 hours  furosemide   Injectable 40 milliGRAM(s) IV Push daily  gabapentin 300 milliGRAM(s) Oral two times a day  glucagon  Injectable 1 milliGRAM(s) IntraMuscular once  guaiFENesin Oral Liquid (Sugar-Free) 200 milliGRAM(s) Oral every 6 hours  hydrALAZINE Injectable 5 milliGRAM(s) IV Push once  insulin lispro (ADMELOG) corrective regimen sliding scale   SubCutaneous three times a day before meals  insulin lispro (ADMELOG) corrective regimen sliding scale   SubCutaneous at bedtime  latanoprost 0.005% Ophthalmic Solution 1 Drop(s) Both EYES at bedtime  losartan 50 milliGRAM(s) Oral daily  polyethylene glycol 3350 17 Gram(s) Oral two times a day  senna 2 Tablet(s) Oral at bedtime  traZODone 100 milliGRAM(s) Oral at bedtime    MEDICATIONS  (PRN):  acetaminophen     Tablet .. 650 milliGRAM(s) Oral every 6 hours PRN Temp greater or equal to 38C (100.4F), Mild Pain (1 - 3)  benzonatate 100 milliGRAM(s) Oral three times a day PRN Cough  dextrose Oral Gel 15 Gram(s) Oral once PRN Blood Glucose LESS THAN 70 milliGRAM(s)/deciliter      I&O's Summary      Vital Signs Last 24 Hrs  T(C): 37.4 (28 May 2023 04:42), Max: 37.4 (28 May 2023 04:42)  T(F): 99.4 (28 May 2023 04:42), Max: 99.4 (28 May 2023 04:42)  HR: 103 (28 May 2023 04:42) (81 - 103)  BP: 122/57 (28 May 2023 04:42) (122/57 - 191/98)  BP(mean): --  RR: 18 (28 May 2023 04:42) (18 - 18)  SpO2: 100% (28 May 2023 04:42) (95% - 100%)    Parameters below as of 28 May 2023 04:42  Patient On (Oxygen Delivery Method): nasal cannula  O2 Flow (L/min): 2      =================PHYSICAL EXAM=================    GENERAL: Laying comfortably, NAD  EYES: EOMI, PERRL, no scleral icterus  NECK: No JVD  LUNG: Clear to auscultation bilaterally; No wheeze, crackles or rhonci  HEART: Regular rate and rhythm; No murmurs, rubs, or gallops  ABDOMEN: Soft, Nontender, Nondistended  EXTREMITIES:  No LE edema, 2+ Peripheral Pulses, No clubbing, cyanosis, or edema  PSYCH: AAOx3  NEUROLOGY: non-focal, strength 5/5 in all extremities, sensation intact  SKIN: No rashes or lesions    =================================================    LABS:                        10.0   5.73  )-----------( 192      ( 27 May 2023 06:45 )             31.7     Auto Eosinophil # x     / Auto Eosinophil % x     / Auto Neutrophil # x     / Auto Neutrophil % x     / BANDS % x        05-27    138  |  101  |  12  ----------------------------<  103<H>  3.8   |  27  |  0.97    Ca    9.3      27 May 2023 06:45  Mg     2.0     05-27  Phos  3.3     05-27  TPro  7.6  /  Alb  3.9  /  TBili  0.3  /  DBili  x   /  AST  24  /  ALT  18  /  AlkPhos  114  05-27                  RADIOLOGY & ADDITIONAL TESTS:    Imaging Personally Reviewed:    Consultant(s) Notes Reviewed:      Care Discussed with Consultants/Other Providers:

## 2023-05-28 NOTE — PROGRESS NOTE ADULT - PROBLEM SELECTOR PLAN 2
- RVP+ parainfluenza, MRSA negative  - no leukocytosis, mild pro-danica elevation  - CXR clear lungs  - CT chest: RUL small airway opacities  - likely viral process, unlikely superimposed bacterial infxn  > cough control with robitussin & tessalon perle  > continue azithromycin until legionella   > f/u urine and blood cultures

## 2023-05-29 DIAGNOSIS — N17.9 ACUTE KIDNEY FAILURE, UNSPECIFIED: ICD-10-CM

## 2023-05-29 LAB
ALBUMIN SERPL ELPH-MCNC: 3.2 G/DL — LOW (ref 3.3–5)
ALP SERPL-CCNC: 93 U/L — SIGNIFICANT CHANGE UP (ref 40–120)
ALT FLD-CCNC: 15 U/L — SIGNIFICANT CHANGE UP (ref 10–45)
ANION GAP SERPL CALC-SCNC: 12 MMOL/L — SIGNIFICANT CHANGE UP (ref 5–17)
AST SERPL-CCNC: 23 U/L — SIGNIFICANT CHANGE UP (ref 10–40)
BILIRUB SERPL-MCNC: 0.2 MG/DL — SIGNIFICANT CHANGE UP (ref 0.2–1.2)
BUN SERPL-MCNC: 26 MG/DL — HIGH (ref 7–23)
CALCIUM SERPL-MCNC: 8.8 MG/DL — SIGNIFICANT CHANGE UP (ref 8.4–10.5)
CHLORIDE SERPL-SCNC: 104 MMOL/L — SIGNIFICANT CHANGE UP (ref 96–108)
CO2 SERPL-SCNC: 26 MMOL/L — SIGNIFICANT CHANGE UP (ref 22–31)
CREAT SERPL-MCNC: 1.32 MG/DL — HIGH (ref 0.5–1.3)
EGFR: 56 ML/MIN/1.73M2 — LOW
GLUCOSE BLDC GLUCOMTR-MCNC: 106 MG/DL — HIGH (ref 70–99)
GLUCOSE BLDC GLUCOMTR-MCNC: 118 MG/DL — HIGH (ref 70–99)
GLUCOSE BLDC GLUCOMTR-MCNC: 128 MG/DL — HIGH (ref 70–99)
GLUCOSE BLDC GLUCOMTR-MCNC: 159 MG/DL — HIGH (ref 70–99)
GLUCOSE SERPL-MCNC: 85 MG/DL — SIGNIFICANT CHANGE UP (ref 70–99)
HCT VFR BLD CALC: 29.7 % — LOW (ref 39–50)
HGB BLD-MCNC: 9.2 G/DL — LOW (ref 13–17)
MAGNESIUM SERPL-MCNC: 2.2 MG/DL — SIGNIFICANT CHANGE UP (ref 1.6–2.6)
MCHC RBC-ENTMCNC: 28.6 PG — SIGNIFICANT CHANGE UP (ref 27–34)
MCHC RBC-ENTMCNC: 31 GM/DL — LOW (ref 32–36)
MCV RBC AUTO: 92.2 FL — SIGNIFICANT CHANGE UP (ref 80–100)
NRBC # BLD: 0 /100 WBCS — SIGNIFICANT CHANGE UP (ref 0–0)
PHOSPHATE SERPL-MCNC: 2.4 MG/DL — LOW (ref 2.5–4.5)
PLATELET # BLD AUTO: 184 K/UL — SIGNIFICANT CHANGE UP (ref 150–400)
POTASSIUM SERPL-MCNC: 3.9 MMOL/L — SIGNIFICANT CHANGE UP (ref 3.5–5.3)
POTASSIUM SERPL-SCNC: 3.9 MMOL/L — SIGNIFICANT CHANGE UP (ref 3.5–5.3)
PROT SERPL-MCNC: 7 G/DL — SIGNIFICANT CHANGE UP (ref 6–8.3)
RBC # BLD: 3.22 M/UL — LOW (ref 4.2–5.8)
RBC # FLD: 14.1 % — SIGNIFICANT CHANGE UP (ref 10.3–14.5)
SODIUM SERPL-SCNC: 142 MMOL/L — SIGNIFICANT CHANGE UP (ref 135–145)
WBC # BLD: 4.13 K/UL — SIGNIFICANT CHANGE UP (ref 3.8–10.5)
WBC # FLD AUTO: 4.13 K/UL — SIGNIFICANT CHANGE UP (ref 3.8–10.5)

## 2023-05-29 PROCEDURE — 99232 SBSQ HOSP IP/OBS MODERATE 35: CPT

## 2023-05-29 RX ORDER — ALBUTEROL 90 UG/1
2 AEROSOL, METERED ORAL EVERY 6 HOURS
Refills: 0 | Status: DISCONTINUED | OUTPATIENT
Start: 2023-05-29 | End: 2023-06-01

## 2023-05-29 RX ORDER — POTASSIUM CHLORIDE 20 MEQ
20 PACKET (EA) ORAL ONCE
Refills: 0 | Status: DISCONTINUED | OUTPATIENT
Start: 2023-05-29 | End: 2023-05-29

## 2023-05-29 RX ORDER — SODIUM,POTASSIUM PHOSPHATES 278-250MG
1 POWDER IN PACKET (EA) ORAL
Refills: 0 | Status: COMPLETED | OUTPATIENT
Start: 2023-05-29 | End: 2023-05-30

## 2023-05-29 RX ADMIN — Medication 1 PACKET(S): at 18:05

## 2023-05-29 RX ADMIN — ATORVASTATIN CALCIUM 80 MILLIGRAM(S): 80 TABLET, FILM COATED ORAL at 22:22

## 2023-05-29 RX ADMIN — Medication 1 PACKET(S): at 13:09

## 2023-05-29 RX ADMIN — ENOXAPARIN SODIUM 40 MILLIGRAM(S): 100 INJECTION SUBCUTANEOUS at 18:12

## 2023-05-29 RX ADMIN — AMLODIPINE BESYLATE 5 MILLIGRAM(S): 2.5 TABLET ORAL at 05:36

## 2023-05-29 RX ADMIN — LOSARTAN POTASSIUM 50 MILLIGRAM(S): 100 TABLET, FILM COATED ORAL at 05:36

## 2023-05-29 RX ADMIN — GABAPENTIN 300 MILLIGRAM(S): 400 CAPSULE ORAL at 18:12

## 2023-05-29 RX ADMIN — Medication 650 MILLIGRAM(S): at 03:57

## 2023-05-29 RX ADMIN — GABAPENTIN 300 MILLIGRAM(S): 400 CAPSULE ORAL at 05:36

## 2023-05-29 RX ADMIN — ALBUTEROL 2 PUFF(S): 90 AEROSOL, METERED ORAL at 20:45

## 2023-05-29 RX ADMIN — Medication 650 MILLIGRAM(S): at 03:27

## 2023-05-29 RX ADMIN — Medication 200 MILLIGRAM(S): at 00:30

## 2023-05-29 RX ADMIN — Medication 100 MILLIGRAM(S): at 23:20

## 2023-05-29 RX ADMIN — Medication 200 MILLIGRAM(S): at 05:36

## 2023-05-29 RX ADMIN — Medication 3 MILLILITER(S): at 01:00

## 2023-05-29 RX ADMIN — SENNA PLUS 2 TABLET(S): 8.6 TABLET ORAL at 22:22

## 2023-05-29 RX ADMIN — CARVEDILOL PHOSPHATE 25 MILLIGRAM(S): 80 CAPSULE, EXTENDED RELEASE ORAL at 05:36

## 2023-05-29 RX ADMIN — CARVEDILOL PHOSPHATE 25 MILLIGRAM(S): 80 CAPSULE, EXTENDED RELEASE ORAL at 18:12

## 2023-05-29 RX ADMIN — Medication 650 MILLIGRAM(S): at 14:07

## 2023-05-29 RX ADMIN — LATANOPROST 1 DROP(S): 0.05 SOLUTION/ DROPS OPHTHALMIC; TOPICAL at 22:22

## 2023-05-29 RX ADMIN — Medication 200 MILLIGRAM(S): at 18:12

## 2023-05-29 RX ADMIN — Medication 81 MILLIGRAM(S): at 13:09

## 2023-05-29 RX ADMIN — Medication 100 MILLIGRAM(S): at 22:22

## 2023-05-29 RX ADMIN — Medication 200 MILLIGRAM(S): at 13:09

## 2023-05-29 RX ADMIN — Medication 1: at 13:10

## 2023-05-29 RX ADMIN — Medication 3 MILLILITER(S): at 06:00

## 2023-05-29 NOTE — PROGRESS NOTE ADULT - SUBJECTIVE AND OBJECTIVE BOX
***************************************************************  Raina Gongora, PGY 1  Internal Medicine   pager: [phone]  ***************************************************************    NAOMI MORRISSEY  MRN: [id number]    Patient is a 73y old Male who presents with a chief complaint of Shortness of Breath (28 May 2023 07:41)    Subjective: No acute events overnight.      MEDICATIONS  (STANDING):  albuterol/ipratropium for Nebulization 3 milliLiter(s) Nebulizer every 6 hours  amLODIPine   Tablet 5 milliGRAM(s) Oral daily  aspirin  chewable 81 milliGRAM(s) Oral daily  atorvastatin 80 milliGRAM(s) Oral at bedtime  carvedilol 25 milliGRAM(s) Oral every 12 hours  dextrose 5%. 1000 milliLiter(s) (50 mL/Hr) IV Continuous <Continuous>  dextrose 5%. 1000 milliLiter(s) (100 mL/Hr) IV Continuous <Continuous>  dextrose 50% Injectable 12.5 Gram(s) IV Push once  dextrose 50% Injectable 25 Gram(s) IV Push once  dextrose 50% Injectable 25 Gram(s) IV Push once  enoxaparin Injectable 40 milliGRAM(s) SubCutaneous every 24 hours  furosemide    Tablet 40 milliGRAM(s) Oral daily  gabapentin 300 milliGRAM(s) Oral two times a day  glucagon  Injectable 1 milliGRAM(s) IntraMuscular once  guaiFENesin Oral Liquid (Sugar-Free) 200 milliGRAM(s) Oral every 6 hours  hydrALAZINE Injectable 5 milliGRAM(s) IV Push once  insulin lispro (ADMELOG) corrective regimen sliding scale   SubCutaneous three times a day before meals  insulin lispro (ADMELOG) corrective regimen sliding scale   SubCutaneous at bedtime  latanoprost 0.005% Ophthalmic Solution 1 Drop(s) Both EYES at bedtime  losartan 50 milliGRAM(s) Oral daily  polyethylene glycol 3350 17 Gram(s) Oral two times a day  senna 2 Tablet(s) Oral at bedtime  traZODone 100 milliGRAM(s) Oral at bedtime    MEDICATIONS  (PRN):  acetaminophen     Tablet .. 650 milliGRAM(s) Oral every 6 hours PRN Temp greater or equal to 38C (100.4F), Mild Pain (1 - 3)  benzonatate 100 milliGRAM(s) Oral three times a day PRN Cough  dextrose Oral Gel 15 Gram(s) Oral once PRN Blood Glucose LESS THAN 70 milliGRAM(s)/deciliter    Objective:  Vitals: Vital Signs Last 24 Hrs  T(C): 36.7 (05-29-23 @ 05:32), Max: 37.2 (05-28-23 @ 20:39)  T(F): 98.1 (05-29-23 @ 05:32), Max: 98.9 (05-28-23 @ 20:39)  HR: 61 (05-29-23 @ 05:32) (61 - 88)  BP: 124/65 (05-29-23 @ 05:32) (105/62 - 124/65)  RR: 18 (05-29-23 @ 05:32) (18 - 19)  SpO2: 98% (05-29-23 @ 05:32) (98% - 99%)               PHYSICAL EXAM:  General: NAD, resting in bed, on RA  Lungs: clear to auscultation in anterior lung fields, no wheezes/rhonchi/rales  Heart: +s1/s2, RRR, no murmurs/gallops/rubs  Abdomen: soft, non-distended, non-tender to palpation, no rigidity  Extremities: +DP pulse bilaterally, no cyanosis/clubbing/edema      LABS:  05-27    138  |  101  |  12  ----------------------------<  103<H>  3.8   |  27  |  0.97    Ca    9.3      27 May 2023 06:45    TPro  7.6  /  Alb  3.9  /  TBili  0.3  /  DBili  x   /  AST  24  /  ALT  18  /  AlkPhos  114  05-27               9.2    4.13  )-----------( 184      ( 29 May 2023 07:00 )             29.7                         10.0   5.73  )-----------( 192      ( 27 May 2023 06:45 )             31.7     CAPILLARY BLOOD GLUCOSE  POCT Blood Glucose.: 114 mg/dL (28 May 2023 20:55)  POCT Blood Glucose.: 139 mg/dL (28 May 2023 17:32)  POCT Blood Glucose.: 116 mg/dL (28 May 2023 13:06)  POCT Blood Glucose.: 114 mg/dL (28 May 2023 09:06)      RADIOLOGY & ADDITIONAL TESTS:    Imaging Personally Reviewed:  [x] YES  [ ] NO    Consultants involved in case:   Consultant(s) Notes Reviewed:  [x] YES  [ ] NO:   Care Discussed with Consultants/Other Providers [x] YES  [ ] NO ***************************************************************  Raina Gongora, PGY 1  Internal Medicine   pager: [phone]  ***************************************************************    NAOMI MORRISSEY  MRN: [id number]    Patient is a 73y old Male who presents with a chief complaint of Shortness of Breath (28 May 2023 07:41)    Subjective: No acute events overnight. Denies CP, SOB, abdominal pain. Tolerating lying flat. Cough improved.       MEDICATIONS  (STANDING):  albuterol/ipratropium for Nebulization 3 milliLiter(s) Nebulizer every 6 hours  amLODIPine   Tablet 5 milliGRAM(s) Oral daily  aspirin  chewable 81 milliGRAM(s) Oral daily  atorvastatin 80 milliGRAM(s) Oral at bedtime  carvedilol 25 milliGRAM(s) Oral every 12 hours  dextrose 5%. 1000 milliLiter(s) (50 mL/Hr) IV Continuous <Continuous>  dextrose 5%. 1000 milliLiter(s) (100 mL/Hr) IV Continuous <Continuous>  dextrose 50% Injectable 12.5 Gram(s) IV Push once  dextrose 50% Injectable 25 Gram(s) IV Push once  dextrose 50% Injectable 25 Gram(s) IV Push once  enoxaparin Injectable 40 milliGRAM(s) SubCutaneous every 24 hours  furosemide    Tablet 40 milliGRAM(s) Oral daily  gabapentin 300 milliGRAM(s) Oral two times a day  glucagon  Injectable 1 milliGRAM(s) IntraMuscular once  guaiFENesin Oral Liquid (Sugar-Free) 200 milliGRAM(s) Oral every 6 hours  hydrALAZINE Injectable 5 milliGRAM(s) IV Push once  insulin lispro (ADMELOG) corrective regimen sliding scale   SubCutaneous three times a day before meals  insulin lispro (ADMELOG) corrective regimen sliding scale   SubCutaneous at bedtime  latanoprost 0.005% Ophthalmic Solution 1 Drop(s) Both EYES at bedtime  losartan 50 milliGRAM(s) Oral daily  polyethylene glycol 3350 17 Gram(s) Oral two times a day  senna 2 Tablet(s) Oral at bedtime  traZODone 100 milliGRAM(s) Oral at bedtime    MEDICATIONS  (PRN):  acetaminophen     Tablet .. 650 milliGRAM(s) Oral every 6 hours PRN Temp greater or equal to 38C (100.4F), Mild Pain (1 - 3)  benzonatate 100 milliGRAM(s) Oral three times a day PRN Cough  dextrose Oral Gel 15 Gram(s) Oral once PRN Blood Glucose LESS THAN 70 milliGRAM(s)/deciliter    Objective:  Vitals: Vital Signs Last 24 Hrs  T(C): 36.7 (05-29-23 @ 05:32), Max: 37.2 (05-28-23 @ 20:39)  T(F): 98.1 (05-29-23 @ 05:32), Max: 98.9 (05-28-23 @ 20:39)  HR: 61 (05-29-23 @ 05:32) (61 - 88)  BP: 124/65 (05-29-23 @ 05:32) (105/62 - 124/65)  RR: 18 (05-29-23 @ 05:32) (18 - 19)  SpO2: 98% (05-29-23 @ 05:32) (98% - 99%)               PHYSICAL EXAM:  General: NAD, resting in bed, on RA  Lungs: clear to auscultation in anterior lung fields, no wheezes  Heart: +s1/s2, RRR, no murmurs or gallops  Abdomen: soft, non-distended, non-tender to palpation, no rigidity  Extremities: no peripheral edema bilaterally    LABS:  05-27    138  |  101  |  12  ----------------------------<  103<H>  3.8   |  27  |  0.97    Ca    9.3      27 May 2023 06:45    TPro  7.6  /  Alb  3.9  /  TBili  0.3  /  DBili  x   /  AST  24  /  ALT  18  /  AlkPhos  114  05-27               9.2    4.13  )-----------( 184      ( 29 May 2023 07:00 )             29.7                         10.0   5.73  )-----------( 192      ( 27 May 2023 06:45 )             31.7     CAPILLARY BLOOD GLUCOSE  POCT Blood Glucose.: 114 mg/dL (28 May 2023 20:55)  POCT Blood Glucose.: 139 mg/dL (28 May 2023 17:32)  POCT Blood Glucose.: 116 mg/dL (28 May 2023 13:06)  POCT Blood Glucose.: 114 mg/dL (28 May 2023 09:06)      RADIOLOGY & ADDITIONAL TESTS:    Imaging Personally Reviewed:  [x] YES  [ ] NO    Consultants involved in case:   Consultant(s) Notes Reviewed:  [x] YES  [ ] NO:   Care Discussed with Consultants/Other Providers [x] YES  [ ] NO

## 2023-05-29 NOTE — PROGRESS NOTE ADULT - PROBLEM SELECTOR PLAN 4
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

## 2023-05-29 NOTE — PROGRESS NOTE ADULT - PROBLEM SELECTOR PLAN 7
- on home repaglinide 1mg qd  > hold oral agent  > SS, fingersticks  > check A1c in AM - on home repaglinide 1mg qd  > hold oral agent  > SS, fingersticks - on home losartan 50mg qd & coreg 25mg bid  - hypertensive to 180s, likely iso missed meds   > resume home anti-hypertensives  > start amlodipine 5mg qd

## 2023-05-29 NOTE — PROGRESS NOTE ADULT - PROBLEM SELECTOR PLAN 2
- RVP+ parainfluenza, MRSA negative  - no leukocytosis, mild pro-danica elevation  - CXR clear lungs  - CT chest: RUL small airway opacities  - likely viral process, unlikely superimposed bacterial infxn  > cough control with robitussin & tessalon perle  > continue azithromycin until legionella   > f/u urine and blood cultures - RVP+ parainfluenza, MRSA negative  - no leukocytosis, mild pro-danica elevation  - CXR clear lungs  - CT chest: RUL small airway opacities  - likely viral process, unlikely superimposed bacterial infxn  > supportive management with robitussin & tessalon perle  > f/u blood culture -- NGTD Patient's Cr increased to 1.32 likely 2/2 overdiuresis  - hold lasix for now  - continue to trend

## 2023-05-29 NOTE — PROGRESS NOTE ADULT - PROBLEM SELECTOR PLAN 3
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

## 2023-05-29 NOTE — PROGRESS NOTE ADULT - PROBLEM SELECTOR PLAN 1
- presenting with acute dyspnea on exertion for 1 day  - associated with myalgias, HA, viral syndrome  - RVP+ parainfluenza  - TTE: EF 51%, mod LV diastolic dysfunction  - ddx viral bronchitis with anxiety component vs less HF exacerbation  > PRN supplemental oxygen for comfort  > trazadone 100mg qhs  > continue duonebs q6h for wheezing - improved  - presenting with acute dyspnea on exertion for 1 day  - associated with myalgias, HA, viral syndrome  - RVP+ parainfluenza  - TTE: EF 51%, mod LV diastolic dysfunction  - ddx viral bronchitis with anxiety component vs less HF exacerbation  > trazadone 100mg qhs  > PRN duonebs q6h for dyspnea/wheezing

## 2023-05-29 NOTE — PROGRESS NOTE ADULT - PROBLEM SELECTOR PLAN 5
- Hgb 9.4, around baseline  - likely AOCD, no overt s/s bleeding  > daily CBC monitoring  > maintain active T&S, Hgb goal >8 > continue home ASA and lipitor qhs  > continue home coreg 25mg bid

## 2023-05-29 NOTE — PROGRESS NOTE ADULT - PROBLEM SELECTOR PLAN 8
- DVT ppx: lovenox  - diet: DASH  - dispo: pending medical course - on home repaglinide 1mg qd  > hold oral agent  > SS, fingersticks

## 2023-05-29 NOTE — PROGRESS NOTE ADULT - PROBLEM SELECTOR PLAN 6
- on home losartan 50mg qd & coreg 25mg bid  - hypertensive to 180s, likely iso missed meds   > resume home anti-hypertensives  > monitor BP, uptitrate losartan as indicated - on home losartan 50mg qd & coreg 25mg bid  - hypertensive to 180s, likely iso missed meds   > resume home anti-hypertensives  > start amlodipine 5mg qd - Hgb 9.4, around baseline  - likely AOCD, no overt s/s bleeding  > daily CBC monitoring  > maintain active T&S, Hgb goal >8

## 2023-05-30 LAB
ALBUMIN SERPL ELPH-MCNC: 3.4 G/DL — SIGNIFICANT CHANGE UP (ref 3.3–5)
ALP SERPL-CCNC: 100 U/L — SIGNIFICANT CHANGE UP (ref 40–120)
ALT FLD-CCNC: 16 U/L — SIGNIFICANT CHANGE UP (ref 10–45)
ANION GAP SERPL CALC-SCNC: 11 MMOL/L — SIGNIFICANT CHANGE UP (ref 5–17)
AST SERPL-CCNC: 25 U/L — SIGNIFICANT CHANGE UP (ref 10–40)
BILIRUB SERPL-MCNC: 0.2 MG/DL — SIGNIFICANT CHANGE UP (ref 0.2–1.2)
BUN SERPL-MCNC: 22 MG/DL — SIGNIFICANT CHANGE UP (ref 7–23)
CALCIUM SERPL-MCNC: 9.5 MG/DL — SIGNIFICANT CHANGE UP (ref 8.4–10.5)
CHLORIDE SERPL-SCNC: 105 MMOL/L — SIGNIFICANT CHANGE UP (ref 96–108)
CO2 SERPL-SCNC: 25 MMOL/L — SIGNIFICANT CHANGE UP (ref 22–31)
CREAT SERPL-MCNC: 1.03 MG/DL — SIGNIFICANT CHANGE UP (ref 0.5–1.3)
EGFR: 76 ML/MIN/1.73M2 — SIGNIFICANT CHANGE UP
GLUCOSE BLDC GLUCOMTR-MCNC: 101 MG/DL — HIGH (ref 70–99)
GLUCOSE BLDC GLUCOMTR-MCNC: 119 MG/DL — HIGH (ref 70–99)
GLUCOSE BLDC GLUCOMTR-MCNC: 123 MG/DL — HIGH (ref 70–99)
GLUCOSE BLDC GLUCOMTR-MCNC: 124 MG/DL — HIGH (ref 70–99)
GLUCOSE BLDC GLUCOMTR-MCNC: 126 MG/DL — HIGH (ref 70–99)
GLUCOSE BLDC GLUCOMTR-MCNC: 130 MG/DL — HIGH (ref 70–99)
GLUCOSE SERPL-MCNC: 96 MG/DL — SIGNIFICANT CHANGE UP (ref 70–99)
HCT VFR BLD CALC: 29.4 % — LOW (ref 39–50)
HGB BLD-MCNC: 9.2 G/DL — LOW (ref 13–17)
MAGNESIUM SERPL-MCNC: 2.1 MG/DL — SIGNIFICANT CHANGE UP (ref 1.6–2.6)
MCHC RBC-ENTMCNC: 28.6 PG — SIGNIFICANT CHANGE UP (ref 27–34)
MCHC RBC-ENTMCNC: 31.3 GM/DL — LOW (ref 32–36)
MCV RBC AUTO: 91.3 FL — SIGNIFICANT CHANGE UP (ref 80–100)
NRBC # BLD: 0 /100 WBCS — SIGNIFICANT CHANGE UP (ref 0–0)
PHOSPHATE SERPL-MCNC: 2.8 MG/DL — SIGNIFICANT CHANGE UP (ref 2.5–4.5)
PLATELET # BLD AUTO: 201 K/UL — SIGNIFICANT CHANGE UP (ref 150–400)
POTASSIUM SERPL-MCNC: 3.9 MMOL/L — SIGNIFICANT CHANGE UP (ref 3.5–5.3)
POTASSIUM SERPL-SCNC: 3.9 MMOL/L — SIGNIFICANT CHANGE UP (ref 3.5–5.3)
PROT SERPL-MCNC: 7.2 G/DL — SIGNIFICANT CHANGE UP (ref 6–8.3)
RBC # BLD: 3.22 M/UL — LOW (ref 4.2–5.8)
RBC # FLD: 13.9 % — SIGNIFICANT CHANGE UP (ref 10.3–14.5)
SODIUM SERPL-SCNC: 141 MMOL/L — SIGNIFICANT CHANGE UP (ref 135–145)
WBC # BLD: 4.88 K/UL — SIGNIFICANT CHANGE UP (ref 3.8–10.5)
WBC # FLD AUTO: 4.88 K/UL — SIGNIFICANT CHANGE UP (ref 3.8–10.5)

## 2023-05-30 PROCEDURE — 93970 EXTREMITY STUDY: CPT | Mod: 26

## 2023-05-30 PROCEDURE — 99232 SBSQ HOSP IP/OBS MODERATE 35: CPT | Mod: GC

## 2023-05-30 PROCEDURE — 99223 1ST HOSP IP/OBS HIGH 75: CPT

## 2023-05-30 RX ORDER — FUROSEMIDE 40 MG
40 TABLET ORAL DAILY
Refills: 0 | Status: DISCONTINUED | OUTPATIENT
Start: 2023-05-30 | End: 2023-06-01

## 2023-05-30 RX ORDER — LANOLIN ALCOHOL/MO/W.PET/CERES
3 CREAM (GRAM) TOPICAL AT BEDTIME
Refills: 0 | Status: DISCONTINUED | OUTPATIENT
Start: 2023-05-30 | End: 2023-06-01

## 2023-05-30 RX ORDER — FUROSEMIDE 40 MG
40 TABLET ORAL DAILY
Refills: 0 | Status: DISCONTINUED | OUTPATIENT
Start: 2023-05-30 | End: 2023-05-30

## 2023-05-30 RX ADMIN — Medication 650 MILLIGRAM(S): at 22:23

## 2023-05-30 RX ADMIN — Medication 200 MILLIGRAM(S): at 05:44

## 2023-05-30 RX ADMIN — Medication 1 PACKET(S): at 11:08

## 2023-05-30 RX ADMIN — Medication 100 MILLIGRAM(S): at 11:31

## 2023-05-30 RX ADMIN — ATORVASTATIN CALCIUM 80 MILLIGRAM(S): 80 TABLET, FILM COATED ORAL at 21:41

## 2023-05-30 RX ADMIN — Medication 40 MILLIGRAM(S): at 12:44

## 2023-05-30 RX ADMIN — SENNA PLUS 2 TABLET(S): 8.6 TABLET ORAL at 21:41

## 2023-05-30 RX ADMIN — Medication 3 MILLIGRAM(S): at 21:41

## 2023-05-30 RX ADMIN — Medication 100 MILLIGRAM(S): at 17:21

## 2023-05-30 RX ADMIN — CARVEDILOL PHOSPHATE 25 MILLIGRAM(S): 80 CAPSULE, EXTENDED RELEASE ORAL at 05:42

## 2023-05-30 RX ADMIN — Medication 650 MILLIGRAM(S): at 21:49

## 2023-05-30 RX ADMIN — Medication 100 MILLIGRAM(S): at 21:41

## 2023-05-30 RX ADMIN — AMLODIPINE BESYLATE 5 MILLIGRAM(S): 2.5 TABLET ORAL at 05:42

## 2023-05-30 RX ADMIN — Medication 200 MILLIGRAM(S): at 11:31

## 2023-05-30 RX ADMIN — ENOXAPARIN SODIUM 40 MILLIGRAM(S): 100 INJECTION SUBCUTANEOUS at 17:21

## 2023-05-30 RX ADMIN — LATANOPROST 1 DROP(S): 0.05 SOLUTION/ DROPS OPHTHALMIC; TOPICAL at 21:42

## 2023-05-30 RX ADMIN — GABAPENTIN 300 MILLIGRAM(S): 400 CAPSULE ORAL at 17:22

## 2023-05-30 RX ADMIN — POLYETHYLENE GLYCOL 3350 17 GRAM(S): 17 POWDER, FOR SOLUTION ORAL at 05:45

## 2023-05-30 RX ADMIN — CARVEDILOL PHOSPHATE 25 MILLIGRAM(S): 80 CAPSULE, EXTENDED RELEASE ORAL at 17:22

## 2023-05-30 RX ADMIN — Medication 200 MILLIGRAM(S): at 00:56

## 2023-05-30 RX ADMIN — GABAPENTIN 300 MILLIGRAM(S): 400 CAPSULE ORAL at 05:42

## 2023-05-30 RX ADMIN — Medication 81 MILLIGRAM(S): at 11:32

## 2023-05-30 NOTE — PROGRESS NOTE ADULT - PROBLEM SELECTOR PLAN 7
- on home losartan 50mg qd & coreg 25mg bid  - hypertensive to 180s, likely iso missed meds   > resume home anti-hypertensives  > start amlodipine 5mg qd - on home repaglinide 1mg qd  > hold oral agent  > SS, fingersticks

## 2023-05-30 NOTE — PROGRESS NOTE ADULT - PROBLEM SELECTOR PLAN 1
- improved  - presenting with acute dyspnea on exertion for 1 day  - associated with myalgias, HA, viral syndrome  - RVP+ parainfluenza  - TTE: EF 51%, mod LV diastolic dysfunction  - ddx viral bronchitis with anxiety component vs less HF exacerbation  > trazadone 100mg qhs  > PRN duonebs q6h for dyspnea/wheezing - presenting with acute dyspnea & orthopnea, fluctuating  - associated with myalgias, HA, viral syndrome  - RVP+ parainfluenza  - TTE: EF 51%, mod LV diastolic dysfunction  - ddx viral bronchitis with anxiety component vs less HF exacerbation  > supportive care for viral illness with anti-tussives  > trazadone 100mg qhs  > PRN albuterol q6h for dyspnea/wheezing

## 2023-05-30 NOTE — CONSULT NOTE ADULT - SUBJECTIVE AND OBJECTIVE BOX
CC: SOB    HPI: 73M w CAD (s/p CABG 2004), CHF (s/p BiV ICD), HTN, HL, DM, EtOH use, recent ORIF here w SOB. Progressive with activity and c/f anxiety. Found to have +parainfluenza. TTE with EF 51% and WMA as below. Diuresis limited by NASIM.    Allergies  No Known Allergies  Intolerances    PAST MEDICAL & SURGICAL HISTORY:  Hypertension  Hyperlipidemia  CAD (coronary artery disease)  Diabetes mellitus  Chronic systolic congestive heart failure  S/P CABG (coronary artery bypass graft)  S/P ICD (internal cardiac defibrillator) procedure  Status post biventricular pacemaker    FAMILY HISTORY:  Family history of CABG (Sibling)    Social History:  from rehab, lived with wife previously  independent with ADL/iADLs  walks with a walker since ORIF  ~50 pack-year smoking history, last cigarette ~1 mo ago  3 drinks of vodka on weekends, last drink ~1 mo ago  no recreational drug use (26 May 2023 12:40)    MEDS:  Home Medications:  acetaminophen 500 mg oral tablet: 1 tab(s) orally every 6 hours as needed for  mild pain (1-3) (26 May 2023 15:43)  aspirin 81 mg oral tablet, chewable: 1 tab(s) orally once a day (26 May 2023 15:43)  atorvastatin 80 mg oral tablet: 1 tab(s) orally once a day (at bedtime) (26 May 2023 15:43)  carvedilol 25 mg oral tablet: 1 tab(s) orally every 12 hours (26 May 2023 15:43)  furosemide 40 mg oral tablet: 1 tab(s) orally once a day (26 May 2023 15:45)  gabapentin 300 mg oral capsule: 1 cap(s) orally 2 times a day (26 May 2023 15:45)  latanoprost 0.005% ophthalmic solution: 1 drop(s) to each affected eye once a day (at bedtime) (26 May 2023 15:45)  losartan 50 mg oral tablet: 1 orally once a day (26 May 2023 15:45)  polyethylene glycol 3350 oral powder for reconstitution: 17 gram(s) orally once a day (26 May 2023 15:45)  repaglinide 1 mg oral tablet: 1 tab(s) orally once a day (before meal) (26 May 2023 15:45)  senna leaf extract oral tablet: 2 tab(s) orally once a day (at bedtime) (26 May 2023 15:45)  traMADol 50 mg oral tablet: 0.5 tab(s) orally every 6 hours As needed Severe Pain (7 - 10) (26 May 2023 15:45)  traZODone 100 mg oral tablet: 1 tab(s) orally once a day (at bedtime) (26 May 2023 15:45)      MEDICATIONS  (STANDING):  amLODIPine   Tablet 5 milliGRAM(s) Oral daily  aspirin  chewable 81 milliGRAM(s) Oral daily  atorvastatin 80 milliGRAM(s) Oral at bedtime  carvedilol 25 milliGRAM(s) Oral every 12 hours  dextrose 5%. 1000 milliLiter(s) (50 mL/Hr) IV Continuous <Continuous>  dextrose 5%. 1000 milliLiter(s) (100 mL/Hr) IV Continuous <Continuous>  dextrose 50% Injectable 12.5 Gram(s) IV Push once  dextrose 50% Injectable 25 Gram(s) IV Push once  dextrose 50% Injectable 25 Gram(s) IV Push once  enoxaparin Injectable 40 milliGRAM(s) SubCutaneous every 24 hours  furosemide    Tablet 40 milliGRAM(s) Oral daily  gabapentin 300 milliGRAM(s) Oral two times a day  glucagon  Injectable 1 milliGRAM(s) IntraMuscular once  insulin lispro (ADMELOG) corrective regimen sliding scale   SubCutaneous at bedtime  insulin lispro (ADMELOG) corrective regimen sliding scale   SubCutaneous three times a day before meals  latanoprost 0.005% Ophthalmic Solution 1 Drop(s) Both EYES at bedtime  melatonin 3 milliGRAM(s) Oral at bedtime  polyethylene glycol 3350 17 Gram(s) Oral two times a day  senna 2 Tablet(s) Oral at bedtime  traZODone 100 milliGRAM(s) Oral at bedtime    MEDICATIONS  (PRN):  acetaminophen     Tablet .. 650 milliGRAM(s) Oral every 6 hours PRN Temp greater or equal to 38C (100.4F), Mild Pain (1 - 3)  albuterol    90 MICROgram(s) HFA Inhaler 2 Puff(s) Inhalation every 6 hours PRN Shortness of Breath and/or Wheezing  benzonatate 100 milliGRAM(s) Oral three times a day PRN Cough  dextrose Oral Gel 15 Gram(s) Oral once PRN Blood Glucose LESS THAN 70 milliGRAM(s)/deciliter      REVIEW OF SYSTEMS:  CONSTITUTIONAL: No fever, weight loss, or fatigue  EYES: No eye pain, visual disturbances, or discharge  ENMT:  No difficulty hearing, tinnitus, vertigo; No sinus or throat pain  NECK: No pain or stiffness  RESPIRATORY: No cough, wheezing, chills or hemoptysis; No Shortness of Breath  CARDIOVASCULAR: No chest pain, palpitations, passing out, dizziness, or leg swelling  GASTROINTESTINAL: No abdominal or epigastric pain. No nausea, vomiting, or hematemesis; No diarrhea or constipation. No melena or hematochezia.  GENITOURINARY: No dysuria, frequency, hematuria, or incontinence  NEUROLOGICAL: No headaches, memory loss, loss of strength, numbness, or tremors  SKIN: No itching, burning, rashes, or lesions   LYMPH Nodes: No enlarged glands  ENDOCRINE: No heat or cold intolerance; No hair loss  MUSCULOSKELETAL: No joint pain or swelling; No muscle, back, or extremity pain  PSYCHIATRIC: No depression, anxiety, mood swings, or difficulty sleeping  HEME/LYMPH: No easy bruising, or bleeding gums  ALLERY AND IMMUNOLOGIC: No hives or eczema	    [x] All others negative	    PHYSICAL EXAM:  Vital Signs Last 24 Hrs  T(C): 37.1 (30 May 2023 11:24), Max: 37.1 (30 May 2023 11:24)  T(F): 98.7 (30 May 2023 11:24), Max: 98.7 (30 May 2023 11:24)  HR: 70 (30 May 2023 11:24) (70 - 72)  BP: 130/62 (30 May 2023 11:24) (115/57 - 130/62)  BP(mean): --  RR: 18 (30 May 2023 11:24) (18 - 18)  SpO2: 98% (30 May 2023 11:24) (98% - 98%)    Parameters below as of 30 May 2023 11:24  Patient On (Oxygen Delivery Method): nasal cannula  O2 Flow (L/min): 3      Daily     Daily     Appearance: Normal	  HEENT:   Normal oral mucosa, PERRL, EOMI	  Lymphatic: No lymphadenopathy  Cardiovascular: Normal S1 S2, No JVD, II/VI HALLEY, No edema  Respiratory: Lungs clear to auscultation	  Psychiatry: A & O x 3, Mood & affect appropriate  Gastrointestinal:  Soft, Non-tender, + BS	  Skin: No rashes, No ecchymoses, No cyanosis	  Neurologic: Non-focal  Extremities: Normal range of motion, No clubbing, cyanosis or edema  Vascular: Peripheral pulses palpable 2+ bilaterally    LABS:	 	  I&O's Summary                                9.2    4.88  )-----------( 201      ( 30 May 2023 07:11 )             29.4     05-30    141  |  105  |  22  ----------------------------<  96  3.9   |  25  |  1.03    Ca    9.5      30 May 2023 07:11  Phos  2.8     05-30  Mg     2.1     05-30    TPro  7.2  /  Alb  3.4  /  TBili  0.2  /  DBili  x   /  AST  25  /  ALT  16  /  AlkPhos  100  05-30      Creatinine Trend: 1.03<--, 1.32<--, 0.97<--, 1.00<--, 1.13<--    ECG: AS- rhythm with frequent PVCs    TTE   1. The left ventricular systolic function is mildly decreased with an ejection fraction of 51 % by Carrizales's method of disks.   2. Mid anteroseptal segment, mid anterolateral segment, apical lateral segment, and mid anterior segment are abnormal.   3. There is moderate (grade 2) left ventricular diastolic dysfunction.   4.Normal right ventricular cavity size and normal systolic function.   5. No prior echocardiogram is available for comparison.    ASSESSMENT/PLAN: 73M w CAD (s/p CABG 2004), CHF (s/p BiV ICD), HTN, HL, DM, EtOH use, recent ORIF here w SOB. Activity/Anxiety component, found to have +parainfluenza. TTE with EF 51% with new WMA.  -tele  -lasix on hold for NASIM, now improved  -cont asa, statin  -cont home coreg  -cont home losartan  -cont amlodipine    ***    Alphonso Feliciano MD, MPhil, [facility]  Cardiologist, [facility]  ; [employer] at [facility]  email: [email or web address] Cardiology and Cardiovascular Surgery on-service contact/call information, go to [email or web address] and use [email or web address]" to login.  Outpatient Cardiology appointments, call  [phone] to arrange with a colleague; I do not have outpatient Cardiology clinic.  CC: SOB    HPI: 73M w CAD (s/p CABG 2004), CHF (s/p BiV ICD), HTN, HL, DM, EtOH use, recent ORIF here w SOB. Progressive with activity and c/f anxiety. Found to have +parainfluenza. TTE with EF 51% and WMA as below. Diuresis limited by NASIM. Exertion at home without CP and prior to this episode was walking 5mi with no exertional symptoms.    Allergies  No Known Allergies  Intolerances    PAST MEDICAL & SURGICAL HISTORY:  Hypertension  Hyperlipidemia  CAD (coronary artery disease)  Diabetes mellitus  Chronic systolic congestive heart failure  S/P CABG (coronary artery bypass graft)  S/P ICD (internal cardiac defibrillator) procedure  Status post biventricular pacemaker    FAMILY HISTORY:  Family history of CABG (Sibling)    Social History:  from rehab, lived with wife previously  independent with ADL/iADLs  walks with a walker since ORIF  ~50 pack-year smoking history, last cigarette ~1 mo ago  3 drinks of vodka on weekends, last drink ~1 mo ago  no recreational drug use (26 May 2023 12:40)    MEDS:  Home Medications:  acetaminophen 500 mg oral tablet: 1 tab(s) orally every 6 hours as needed for  mild pain (1-3) (26 May 2023 15:43)  aspirin 81 mg oral tablet, chewable: 1 tab(s) orally once a day (26 May 2023 15:43)  atorvastatin 80 mg oral tablet: 1 tab(s) orally once a day (at bedtime) (26 May 2023 15:43)  carvedilol 25 mg oral tablet: 1 tab(s) orally every 12 hours (26 May 2023 15:43)  furosemide 40 mg oral tablet: 1 tab(s) orally once a day (26 May 2023 15:45)  gabapentin 300 mg oral capsule: 1 cap(s) orally 2 times a day (26 May 2023 15:45)  latanoprost 0.005% ophthalmic solution: 1 drop(s) to each affected eye once a day (at bedtime) (26 May 2023 15:45)  losartan 50 mg oral tablet: 1 orally once a day (26 May 2023 15:45)  polyethylene glycol 3350 oral powder for reconstitution: 17 gram(s) orally once a day (26 May 2023 15:45)  repaglinide 1 mg oral tablet: 1 tab(s) orally once a day (before meal) (26 May 2023 15:45)  senna leaf extract oral tablet: 2 tab(s) orally once a day (at bedtime) (26 May 2023 15:45)  traMADol 50 mg oral tablet: 0.5 tab(s) orally every 6 hours As needed Severe Pain (7 - 10) (26 May 2023 15:45)  traZODone 100 mg oral tablet: 1 tab(s) orally once a day (at bedtime) (26 May 2023 15:45)      MEDICATIONS  (STANDING):  amLODIPine   Tablet 5 milliGRAM(s) Oral daily  aspirin  chewable 81 milliGRAM(s) Oral daily  atorvastatin 80 milliGRAM(s) Oral at bedtime  carvedilol 25 milliGRAM(s) Oral every 12 hours  dextrose 5%. 1000 milliLiter(s) (50 mL/Hr) IV Continuous <Continuous>  dextrose 5%. 1000 milliLiter(s) (100 mL/Hr) IV Continuous <Continuous>  dextrose 50% Injectable 12.5 Gram(s) IV Push once  dextrose 50% Injectable 25 Gram(s) IV Push once  dextrose 50% Injectable 25 Gram(s) IV Push once  enoxaparin Injectable 40 milliGRAM(s) SubCutaneous every 24 hours  furosemide    Tablet 40 milliGRAM(s) Oral daily  gabapentin 300 milliGRAM(s) Oral two times a day  glucagon  Injectable 1 milliGRAM(s) IntraMuscular once  insulin lispro (ADMELOG) corrective regimen sliding scale   SubCutaneous at bedtime  insulin lispro (ADMELOG) corrective regimen sliding scale   SubCutaneous three times a day before meals  latanoprost 0.005% Ophthalmic Solution 1 Drop(s) Both EYES at bedtime  melatonin 3 milliGRAM(s) Oral at bedtime  polyethylene glycol 3350 17 Gram(s) Oral two times a day  senna 2 Tablet(s) Oral at bedtime  traZODone 100 milliGRAM(s) Oral at bedtime    MEDICATIONS  (PRN):  acetaminophen     Tablet .. 650 milliGRAM(s) Oral every 6 hours PRN Temp greater or equal to 38C (100.4F), Mild Pain (1 - 3)  albuterol    90 MICROgram(s) HFA Inhaler 2 Puff(s) Inhalation every 6 hours PRN Shortness of Breath and/or Wheezing  benzonatate 100 milliGRAM(s) Oral three times a day PRN Cough  dextrose Oral Gel 15 Gram(s) Oral once PRN Blood Glucose LESS THAN 70 milliGRAM(s)/deciliter      REVIEW OF SYSTEMS:  CONSTITUTIONAL: No fever, weight loss, or fatigue  EYES: No eye pain, visual disturbances, or discharge  ENMT:  No difficulty hearing, tinnitus, vertigo; No sinus or throat pain  NECK: No pain or stiffness  RESPIRATORY: No cough, wheezing, chills or hemoptysis; No Shortness of Breath  CARDIOVASCULAR: No chest pain, palpitations, passing out, dizziness, or leg swelling  GASTROINTESTINAL: No abdominal or epigastric pain. No nausea, vomiting, or hematemesis; No diarrhea or constipation. No melena or hematochezia.  GENITOURINARY: No dysuria, frequency, hematuria, or incontinence  NEUROLOGICAL: No headaches, memory loss, loss of strength, numbness, or tremors  SKIN: No itching, burning, rashes, or lesions   LYMPH Nodes: No enlarged glands  ENDOCRINE: No heat or cold intolerance; No hair loss  MUSCULOSKELETAL: No joint pain or swelling; No muscle, back, or extremity pain  PSYCHIATRIC: No depression, anxiety, mood swings, or difficulty sleeping  HEME/LYMPH: No easy bruising, or bleeding gums  ALLERY AND IMMUNOLOGIC: No hives or eczema	    [x] All others negative	    PHYSICAL EXAM:  Vital Signs Last 24 Hrs  T(C): 37.1 (30 May 2023 11:24), Max: 37.1 (30 May 2023 11:24)  T(F): 98.7 (30 May 2023 11:24), Max: 98.7 (30 May 2023 11:24)  HR: 70 (30 May 2023 11:24) (70 - 72)  BP: 130/62 (30 May 2023 11:24) (115/57 - 130/62)  BP(mean): --  RR: 18 (30 May 2023 11:24) (18 - 18)  SpO2: 98% (30 May 2023 11:24) (98% - 98%)    Parameters below as of 30 May 2023 11:24  Patient On (Oxygen Delivery Method): nasal cannula  O2 Flow (L/min): 3      Daily     Daily     Appearance: Normal	  HEENT:   Normal oral mucosa, PERRL, EOMI	  Lymphatic: No lymphadenopathy  Cardiovascular: Normal S1 S2, No JVD, II/VI HALLEY, No edema  Respiratory: Lungs clear to auscultation	  Psychiatry: A & O x 3, Mood & affect appropriate  Gastrointestinal:  Soft, Non-tender, + BS	  Skin: No rashes, No ecchymoses, No cyanosis	  Neurologic: Non-focal  Extremities: Normal range of motion, No clubbing, cyanosis or edema  Vascular: Peripheral pulses palpable 2+ bilaterally    LABS:	 	  I&O's Summary                                9.2    4.88  )-----------( 201      ( 30 May 2023 07:11 )             29.4     05-30    141  |  105  |  22  ----------------------------<  96  3.9   |  25  |  1.03    Ca    9.5      30 May 2023 07:11  Phos  2.8     05-30  Mg     2.1     05-30    TPro  7.2  /  Alb  3.4  /  TBili  0.2  /  DBili  x   /  AST  25  /  ALT  16  /  AlkPhos  100  05-30      Creatinine Trend: 1.03<--, 1.32<--, 0.97<--, 1.00<--, 1.13<--    ECG: AS- rhythm with frequent PVCs    TTE   1. The left ventricular systolic function is mildly decreased with an ejection fraction of 51 % by Carrizales's method of disks.   2. Mid anteroseptal segment, mid anterolateral segment, apical lateral segment, and mid anterior segment are abnormal.   3. There is moderate (grade 2) left ventricular diastolic dysfunction.   4.Normal right ventricular cavity size and normal systolic function.   5. No prior echocardiogram is available for comparison.    ASSESSMENT/PLAN: 73M w CAD (s/p CABG 2004), CHF (s/p BiV ICD), HTN, HL, DM, EtOH use, recent ORIF here w SOB. Activity/Anxiety component, found to have +parainfluenza. TTE with EF 51% with new WMA. Lower suspicion of cardiac etiology given onset with paraflu and recent good exercise tolerance without exertional symptoms/  -tele  -lasix on hold for NASIM, now improved  -cont asa, statin  -cont home coreg  -cont home losartan  -cont amlodipine  -close monitor of symptoms with progression and improvement of paraflu    ***    Alphonso Feliciano MD, MPhil, [facility]  Cardiologist, [facility]  ; [employer] at [facility]  email: [email or web address] Cardiology and Cardiovascular Surgery on-service contact/call information, go to [email or web address] and use [email or web address]" to login.  Outpatient Cardiology appointments, call  [phone] to arrange with a colleague; I do not have outpatient Cardiology clinic.

## 2023-05-30 NOTE — PROGRESS NOTE ADULT - PROBLEM SELECTOR PLAN 6
- Hgb 9.4, around baseline  - likely AOCD, no overt s/s bleeding  > daily CBC monitoring  > maintain active T&S, Hgb goal >8 - on home losartan 50mg qd & coreg 25mg bid  - hypertensive to 180s, likely iso missed meds   > continue home coreg  > will resume losartan tomorrow iso recent NASIM  > start amlodipine 5mg qd

## 2023-05-30 NOTE — PROGRESS NOTE ADULT - SUBJECTIVE AND OBJECTIVE BOX
***************************************************************  Raina Gongora, PGY 1  Internal Medicine   pager: [phone]  ***************************************************************    NAOMI MORRISSEY  MRN: [id number]    Patient is a 73y old Male who presents with a chief complaint of Shortness of Breath (29 May 2023 07:51)      Subjective: No acute events overnight.      MEDICATIONS  (STANDING):  amLODIPine   Tablet 5 milliGRAM(s) Oral daily  aspirin  chewable 81 milliGRAM(s) Oral daily  atorvastatin 80 milliGRAM(s) Oral at bedtime  carvedilol 25 milliGRAM(s) Oral every 12 hours  dextrose 5%. 1000 milliLiter(s) (100 mL/Hr) IV Continuous <Continuous>  dextrose 5%. 1000 milliLiter(s) (50 mL/Hr) IV Continuous <Continuous>  dextrose 50% Injectable 12.5 Gram(s) IV Push once  dextrose 50% Injectable 25 Gram(s) IV Push once  dextrose 50% Injectable 25 Gram(s) IV Push once  enoxaparin Injectable 40 milliGRAM(s) SubCutaneous every 24 hours  gabapentin 300 milliGRAM(s) Oral two times a day  glucagon  Injectable 1 milliGRAM(s) IntraMuscular once  guaiFENesin Oral Liquid (Sugar-Free) 200 milliGRAM(s) Oral every 6 hours  insulin lispro (ADMELOG) corrective regimen sliding scale   SubCutaneous three times a day before meals  insulin lispro (ADMELOG) corrective regimen sliding scale   SubCutaneous at bedtime  latanoprost 0.005% Ophthalmic Solution 1 Drop(s) Both EYES at bedtime  melatonin 3 milliGRAM(s) Oral at bedtime  polyethylene glycol 3350 17 Gram(s) Oral two times a day  potassium phosphate / sodium phosphate Powder (PHOS-NaK) 1 Packet(s) Oral three times a day with meals  senna 2 Tablet(s) Oral at bedtime  traZODone 100 milliGRAM(s) Oral at bedtime    MEDICATIONS  (PRN):  acetaminophen     Tablet .. 650 milliGRAM(s) Oral every 6 hours PRN Temp greater or equal to 38C (100.4F), Mild Pain (1 - 3)  albuterol    90 MICROgram(s) HFA Inhaler 2 Puff(s) Inhalation every 6 hours PRN Shortness of Breath and/or Wheezing  benzonatate 100 milliGRAM(s) Oral three times a day PRN Cough  dextrose Oral Gel 15 Gram(s) Oral once PRN Blood Glucose LESS THAN 70 milliGRAM(s)/deciliter      Objective:  Vitals: Vital Signs Last 24 Hrs  T(C): 36.7 (05-29-23 @ 20:15), Max: 36.7 (05-29-23 @ 20:15)  T(F): 98.1 (05-29-23 @ 20:15), Max: 98.1 (05-29-23 @ 20:15)  HR: 72 (05-29-23 @ 20:15) (72 - 72)  BP: 115/57 (05-29-23 @ 20:15) (112/56 - 115/57)  RR: 18 (05-29-23 @ 20:15) (18 - 18)  SpO2: 98% (05-29-23 @ 20:15) (98% - 99%)               PHYSICAL EXAM:  General: NAD, resting in bed, on RA  Lungs: clear to auscultation in anterior lung fields, no wheezes  Heart: +s1/s2, RRR, no murmurs/gallops/rubs  Abdomen: soft, non-distended, non-tender to palpation, no rigidity  Extremities: +DP pulse bilaterally, no cyanosis/clubbing/edema    LABS:  05-29    142  |  104  |  26<H>  ----------------------------<  85  3.9   |  26  |  1.32<H>    Ca    8.8      29 May 2023 07:00  Phos  2.4     05-29  Mg     2.2     05-29    TPro  7.0  /  Alb  3.2<L>  /  TBili  0.2  /  DBili  x   /  AST  23  /  ALT  15  /  AlkPhos  93  05-29                          9.2    4.13  )-----------( 184      ( 29 May 2023 07:00 )             29.7     CAPILLARY BLOOD GLUCOSE  POCT Blood Glucose.: 118 mg/dL (29 May 2023 21:12)  POCT Blood Glucose.: 128 mg/dL (29 May 2023 17:17)  POCT Blood Glucose.: 159 mg/dL (29 May 2023 12:39)  POCT Blood Glucose.: 106 mg/dL (29 May 2023 08:59)    RADIOLOGY & ADDITIONAL TESTS:    Imaging Personally Reviewed:  [x] YES  [ ] NO    Consultants involved in case:   Consultant(s) Notes Reviewed:  [x] YES  [ ] NO:   Care Discussed with Consultants/Other Providers [x] YES  [ ] NO     ***************************************************************  Raina Gongora, PGY 1  Internal Medicine   pager: [phone]  ***************************************************************    NAOMI MORRISSEY  MRN: [id number]    Patient is a 73y old Male who presents with a chief complaint of Shortness of Breath (29 May 2023 07:51)      Subjective: No acute events overnight. States persistent dyspnea. Denies CP, abdominal pain.       MEDICATIONS  (STANDING):  amLODIPine   Tablet 5 milliGRAM(s) Oral daily  aspirin  chewable 81 milliGRAM(s) Oral daily  atorvastatin 80 milliGRAM(s) Oral at bedtime  carvedilol 25 milliGRAM(s) Oral every 12 hours  dextrose 5%. 1000 milliLiter(s) (100 mL/Hr) IV Continuous <Continuous>  dextrose 5%. 1000 milliLiter(s) (50 mL/Hr) IV Continuous <Continuous>  dextrose 50% Injectable 12.5 Gram(s) IV Push once  dextrose 50% Injectable 25 Gram(s) IV Push once  dextrose 50% Injectable 25 Gram(s) IV Push once  enoxaparin Injectable 40 milliGRAM(s) SubCutaneous every 24 hours  gabapentin 300 milliGRAM(s) Oral two times a day  glucagon  Injectable 1 milliGRAM(s) IntraMuscular once  guaiFENesin Oral Liquid (Sugar-Free) 200 milliGRAM(s) Oral every 6 hours  insulin lispro (ADMELOG) corrective regimen sliding scale   SubCutaneous three times a day before meals  insulin lispro (ADMELOG) corrective regimen sliding scale   SubCutaneous at bedtime  latanoprost 0.005% Ophthalmic Solution 1 Drop(s) Both EYES at bedtime  melatonin 3 milliGRAM(s) Oral at bedtime  polyethylene glycol 3350 17 Gram(s) Oral two times a day  potassium phosphate / sodium phosphate Powder (PHOS-NaK) 1 Packet(s) Oral three times a day with meals  senna 2 Tablet(s) Oral at bedtime  traZODone 100 milliGRAM(s) Oral at bedtime    MEDICATIONS  (PRN):  acetaminophen     Tablet .. 650 milliGRAM(s) Oral every 6 hours PRN Temp greater or equal to 38C (100.4F), Mild Pain (1 - 3)  albuterol    90 MICROgram(s) HFA Inhaler 2 Puff(s) Inhalation every 6 hours PRN Shortness of Breath and/or Wheezing  benzonatate 100 milliGRAM(s) Oral three times a day PRN Cough  dextrose Oral Gel 15 Gram(s) Oral once PRN Blood Glucose LESS THAN 70 milliGRAM(s)/deciliter      Objective:  Vitals: Vital Signs Last 24 Hrs  T(C): 36.7 (05-29-23 @ 20:15), Max: 36.7 (05-29-23 @ 20:15)  T(F): 98.1 (05-29-23 @ 20:15), Max: 98.1 (05-29-23 @ 20:15)  HR: 72 (05-29-23 @ 20:15) (72 - 72)  BP: 115/57 (05-29-23 @ 20:15) (112/56 - 115/57)  RR: 18 (05-29-23 @ 20:15) (18 - 18)  SpO2: 98% (05-29-23 @ 20:15) (98% - 99%)               PHYSICAL EXAM:  General: NAD, resting in bed, on RA  Lungs: clear to auscultation in anterior lung fields, no wheezes  Heart: +s1/s2, RRR, no murmurs or gallops  Abdomen: soft, non-distended, non-tender to palpation, no rigidity  Extremities: no peripheral edema bilaterally    LABS:  05-29    142  |  104  |  26<H>  ----------------------------<  85  3.9   |  26  |  1.32<H>    Ca    8.8      29 May 2023 07:00  Phos  2.4     05-29  Mg     2.2     05-29    TPro  7.0  /  Alb  3.2<L>  /  TBili  0.2  /  DBili  x   /  AST  23  /  ALT  15  /  AlkPhos  93  05-29                          9.2    4.13  )-----------( 184      ( 29 May 2023 07:00 )             29.7     CAPILLARY BLOOD GLUCOSE  POCT Blood Glucose.: 118 mg/dL (29 May 2023 21:12)  POCT Blood Glucose.: 128 mg/dL (29 May 2023 17:17)  POCT Blood Glucose.: 159 mg/dL (29 May 2023 12:39)  POCT Blood Glucose.: 106 mg/dL (29 May 2023 08:59)    RADIOLOGY & ADDITIONAL TESTS:    Imaging Personally Reviewed:  [x] YES  [ ] NO    Consultants involved in case:   Consultant(s) Notes Reviewed:  [x] YES  [ ] NO:   Care Discussed with Consultants/Other Providers [x] YES  [ ] NO

## 2023-05-30 NOTE — PROGRESS NOTE ADULT - PROBLEM SELECTOR PLAN 2
Patient's Cr increased to 1.32 likely 2/2 overdiuresis  - hold lasix for now  - continue to trend - RVP+ parainfluenza, MRSA negative  - no leukocytosis, mild pro-danica elevation  - CXR clear lungs  - CT chest: RUL small airway opacities  - likely viral process, unlikely superimposed bacterial infxn  > supportive management with robitussin & tessalon perle  > f/u blood culture -- NGTD

## 2023-05-30 NOTE — PROGRESS NOTE ADULT - PROBLEM SELECTOR PLAN 4
- s/p CICU admission in 2014 requiring BiV ICD  - TTE: EF 51%, moderate DD, segmental abnormalities  - s/p IV lasix 40mg with improved dyspnea/orthopnea  > hold diuresis today iso NASIM  > strict I/Os  > daily BMP, maintain K>4, Mg>2 > continue home ASA and lipitor qhs  > continue home coreg 25mg bid

## 2023-05-30 NOTE — PROGRESS NOTE ADULT - PROBLEM SELECTOR PLAN 8
- on home repaglinide 1mg qd  > hold oral agent  > SS, fingersticks - DVT ppx: lovenox  - diet: DASH  - dispo: home likely tomorrow

## 2023-05-30 NOTE — PROGRESS NOTE ADULT - PROBLEM SELECTOR PLAN 3
- RVP+ parainfluenza, MRSA negative  - no leukocytosis, mild pro-danica elevation  - CXR clear lungs  - CT chest: RUL small airway opacities  - likely viral process, unlikely superimposed bacterial infxn  > supportive management with robitussin & tessalon perle  > f/u blood culture -- NGTD - s/p CICU admission in 2014 requiring BiV ICD  - TTE: EF 51%, moderate DD, segmental abnormalities  - s/p IV lasix 40mg with improved dyspnea/orthopnea  > resume home lasix 40mg qd today  > strict I/Os  > daily BMP, maintain K>4, Mg>2

## 2023-05-30 NOTE — PROGRESS NOTE ADULT - PROBLEM SELECTOR PLAN 9
- DVT ppx: lovenox  - diet: DASH  - dispo: pending medical course
- DVT ppx: lovenox  - diet: DASH  - dispo: pending medical course

## 2023-05-30 NOTE — PROGRESS NOTE ADULT - PROBLEM SELECTOR PLAN 5
> continue home ASA and lipitor qhs  > continue home coreg 25mg bid - Hgb 9.4, around baseline  - likely AOCD, no overt s/s bleeding  > daily CBC monitoring  > maintain active T&S, Hgb goal >8

## 2023-05-31 ENCOUNTER — TRANSCRIPTION ENCOUNTER (OUTPATIENT)
Age: 73
End: 2023-05-31

## 2023-05-31 LAB
ALBUMIN SERPL ELPH-MCNC: 3.7 G/DL — SIGNIFICANT CHANGE UP (ref 3.3–5)
ALP SERPL-CCNC: 109 U/L — SIGNIFICANT CHANGE UP (ref 40–120)
ALT FLD-CCNC: 15 U/L — SIGNIFICANT CHANGE UP (ref 10–45)
ANION GAP SERPL CALC-SCNC: 10 MMOL/L — SIGNIFICANT CHANGE UP (ref 5–17)
AST SERPL-CCNC: 24 U/L — SIGNIFICANT CHANGE UP (ref 10–40)
BILIRUB SERPL-MCNC: 0.2 MG/DL — SIGNIFICANT CHANGE UP (ref 0.2–1.2)
BUN SERPL-MCNC: 15 MG/DL — SIGNIFICANT CHANGE UP (ref 7–23)
CALCIUM SERPL-MCNC: 9.8 MG/DL — SIGNIFICANT CHANGE UP (ref 8.4–10.5)
CHLORIDE SERPL-SCNC: 102 MMOL/L — SIGNIFICANT CHANGE UP (ref 96–108)
CO2 SERPL-SCNC: 27 MMOL/L — SIGNIFICANT CHANGE UP (ref 22–31)
CREAT SERPL-MCNC: 0.97 MG/DL — SIGNIFICANT CHANGE UP (ref 0.5–1.3)
D DIMER BLD IA.RAPID-MCNC: 499 NG/ML DDU — HIGH
EGFR: 81 ML/MIN/1.73M2 — SIGNIFICANT CHANGE UP
GLUCOSE BLDC GLUCOMTR-MCNC: 104 MG/DL — HIGH (ref 70–99)
GLUCOSE BLDC GLUCOMTR-MCNC: 122 MG/DL — HIGH (ref 70–99)
GLUCOSE BLDC GLUCOMTR-MCNC: 125 MG/DL — HIGH (ref 70–99)
GLUCOSE BLDC GLUCOMTR-MCNC: 130 MG/DL — HIGH (ref 70–99)
GLUCOSE SERPL-MCNC: 113 MG/DL — HIGH (ref 70–99)
HCT VFR BLD CALC: 31.2 % — LOW (ref 39–50)
HGB BLD-MCNC: 9.7 G/DL — LOW (ref 13–17)
MAGNESIUM SERPL-MCNC: 2 MG/DL — SIGNIFICANT CHANGE UP (ref 1.6–2.6)
MCHC RBC-ENTMCNC: 28 PG — SIGNIFICANT CHANGE UP (ref 27–34)
MCHC RBC-ENTMCNC: 31.1 GM/DL — LOW (ref 32–36)
MCV RBC AUTO: 90.2 FL — SIGNIFICANT CHANGE UP (ref 80–100)
NRBC # BLD: 0 /100 WBCS — SIGNIFICANT CHANGE UP (ref 0–0)
PHOSPHATE SERPL-MCNC: 3.3 MG/DL — SIGNIFICANT CHANGE UP (ref 2.5–4.5)
PLATELET # BLD AUTO: 228 K/UL — SIGNIFICANT CHANGE UP (ref 150–400)
POTASSIUM SERPL-MCNC: 3.8 MMOL/L — SIGNIFICANT CHANGE UP (ref 3.5–5.3)
POTASSIUM SERPL-SCNC: 3.8 MMOL/L — SIGNIFICANT CHANGE UP (ref 3.5–5.3)
PROT SERPL-MCNC: 7.7 G/DL — SIGNIFICANT CHANGE UP (ref 6–8.3)
RBC # BLD: 3.46 M/UL — LOW (ref 4.2–5.8)
RBC # FLD: 13.7 % — SIGNIFICANT CHANGE UP (ref 10.3–14.5)
SODIUM SERPL-SCNC: 139 MMOL/L — SIGNIFICANT CHANGE UP (ref 135–145)
WBC # BLD: 4.71 K/UL — SIGNIFICANT CHANGE UP (ref 3.8–10.5)
WBC # FLD AUTO: 4.71 K/UL — SIGNIFICANT CHANGE UP (ref 3.8–10.5)

## 2023-05-31 PROCEDURE — 99232 SBSQ HOSP IP/OBS MODERATE 35: CPT | Mod: GC

## 2023-05-31 PROCEDURE — 99233 SBSQ HOSP IP/OBS HIGH 50: CPT

## 2023-05-31 RX ORDER — POTASSIUM CHLORIDE 20 MEQ
20 PACKET (EA) ORAL ONCE
Refills: 0 | Status: COMPLETED | OUTPATIENT
Start: 2023-05-31 | End: 2023-05-31

## 2023-05-31 RX ORDER — LOSARTAN POTASSIUM 100 MG/1
50 TABLET, FILM COATED ORAL DAILY
Refills: 0 | Status: DISCONTINUED | OUTPATIENT
Start: 2023-05-31 | End: 2023-06-01

## 2023-05-31 RX ADMIN — LOSARTAN POTASSIUM 50 MILLIGRAM(S): 100 TABLET, FILM COATED ORAL at 11:56

## 2023-05-31 RX ADMIN — CARVEDILOL PHOSPHATE 25 MILLIGRAM(S): 80 CAPSULE, EXTENDED RELEASE ORAL at 06:23

## 2023-05-31 RX ADMIN — Medication 650 MILLIGRAM(S): at 21:53

## 2023-05-31 RX ADMIN — Medication 81 MILLIGRAM(S): at 11:56

## 2023-05-31 RX ADMIN — GABAPENTIN 300 MILLIGRAM(S): 400 CAPSULE ORAL at 06:23

## 2023-05-31 RX ADMIN — Medication 650 MILLIGRAM(S): at 21:23

## 2023-05-31 RX ADMIN — Medication 20 MILLIEQUIVALENT(S): at 11:57

## 2023-05-31 RX ADMIN — AMLODIPINE BESYLATE 5 MILLIGRAM(S): 2.5 TABLET ORAL at 06:23

## 2023-05-31 RX ADMIN — ATORVASTATIN CALCIUM 80 MILLIGRAM(S): 80 TABLET, FILM COATED ORAL at 21:22

## 2023-05-31 RX ADMIN — ENOXAPARIN SODIUM 40 MILLIGRAM(S): 100 INJECTION SUBCUTANEOUS at 17:24

## 2023-05-31 RX ADMIN — Medication 650 MILLIGRAM(S): at 06:59

## 2023-05-31 RX ADMIN — Medication 100 MILLIGRAM(S): at 21:23

## 2023-05-31 RX ADMIN — GABAPENTIN 300 MILLIGRAM(S): 400 CAPSULE ORAL at 17:24

## 2023-05-31 RX ADMIN — Medication 3 MILLIGRAM(S): at 21:23

## 2023-05-31 RX ADMIN — CARVEDILOL PHOSPHATE 25 MILLIGRAM(S): 80 CAPSULE, EXTENDED RELEASE ORAL at 17:24

## 2023-05-31 RX ADMIN — SENNA PLUS 2 TABLET(S): 8.6 TABLET ORAL at 21:22

## 2023-05-31 RX ADMIN — Medication 650 MILLIGRAM(S): at 06:26

## 2023-05-31 RX ADMIN — Medication 40 MILLIGRAM(S): at 06:23

## 2023-05-31 NOTE — DISCHARGE NOTE PROVIDER - PROVIDER TOKENS
PROVIDER:[TOKEN:[[id number]],FOLLOWUP:[1 week]],PROVIDER:[TOKEN:[[id number]],FOLLOWUP:[1 week]]

## 2023-05-31 NOTE — DISCHARGE NOTE NURSING/CASE MANAGEMENT/SOCIAL WORK - PATIENT PORTAL LINK FT
You can access the FollowMyHealth Patient Portal offered by [facility] by registering at the following website: [email or web address]. By joining [employer]’s FollowMyHealth portal, you will also be able to view your health information using other applications (apps) compatible with our system.

## 2023-05-31 NOTE — PROGRESS NOTE ADULT - PROBLEM SELECTOR PLAN 6
- on home losartan 50mg qd & coreg 25mg bid  - hypertensive to 180s, likely iso missed meds   > continue home coreg  > will resume losartan tomorrow iso recent NASIM  > start amlodipine 5mg qd - on home losartan 50mg qd & coreg 25mg bid  - hypertensive to 180s, likely iso missed meds   > continue home coreg  > wresume losartan 50mg qd  > start amlodipine 5mg qd

## 2023-05-31 NOTE — PROGRESS NOTE ADULT - SUBJECTIVE AND OBJECTIVE BOX
INTERVAL EVENTS/SUBJ:  No events     Home Medications:  acetaminophen 500 mg oral tablet: 1 tab(s) orally every 6 hours as needed for  mild pain (1-3) (26 May 2023 15:43)  aspirin 81 mg oral tablet, chewable: 1 tab(s) orally once a day (26 May 2023 15:43)  atorvastatin 80 mg oral tablet: 1 tab(s) orally once a day (at bedtime) (26 May 2023 15:43)  carvedilol 25 mg oral tablet: 1 tab(s) orally every 12 hours (26 May 2023 15:43)  furosemide 40 mg oral tablet: 1 tab(s) orally once a day (26 May 2023 15:45)  gabapentin 300 mg oral capsule: 1 cap(s) orally 2 times a day (26 May 2023 15:45)  latanoprost 0.005% ophthalmic solution: 1 drop(s) to each affected eye once a day (at bedtime) (26 May 2023 15:45)  losartan 50 mg oral tablet: 1 orally once a day (26 May 2023 15:45)  polyethylene glycol 3350 oral powder for reconstitution: 17 gram(s) orally once a day (26 May 2023 15:45)  repaglinide 1 mg oral tablet: 1 tab(s) orally once a day (before meal) (26 May 2023 15:45)  senna leaf extract oral tablet: 2 tab(s) orally once a day (at bedtime) (26 May 2023 15:45)  traMADol 50 mg oral tablet: 0.5 tab(s) orally every 6 hours As needed Severe Pain (7 - 10) (26 May 2023 15:45)  traZODone 100 mg oral tablet: 1 tab(s) orally once a day (at bedtime) (26 May 2023 15:45)      MEDICATIONS  (STANDING):  amLODIPine   Tablet 5 milliGRAM(s) Oral daily  aspirin  chewable 81 milliGRAM(s) Oral daily  atorvastatin 80 milliGRAM(s) Oral at bedtime  carvedilol 25 milliGRAM(s) Oral every 12 hours  dextrose 5%. 1000 milliLiter(s) (100 mL/Hr) IV Continuous <Continuous>  dextrose 5%. 1000 milliLiter(s) (50 mL/Hr) IV Continuous <Continuous>  dextrose 50% Injectable 25 Gram(s) IV Push once  dextrose 50% Injectable 25 Gram(s) IV Push once  dextrose 50% Injectable 12.5 Gram(s) IV Push once  enoxaparin Injectable 40 milliGRAM(s) SubCutaneous every 24 hours  furosemide    Tablet 40 milliGRAM(s) Oral daily  gabapentin 300 milliGRAM(s) Oral two times a day  glucagon  Injectable 1 milliGRAM(s) IntraMuscular once  insulin lispro (ADMELOG) corrective regimen sliding scale   SubCutaneous at bedtime  insulin lispro (ADMELOG) corrective regimen sliding scale   SubCutaneous three times a day before meals  latanoprost 0.005% Ophthalmic Solution 1 Drop(s) Both EYES at bedtime  melatonin 3 milliGRAM(s) Oral at bedtime  polyethylene glycol 3350 17 Gram(s) Oral two times a day  senna 2 Tablet(s) Oral at bedtime  traZODone 100 milliGRAM(s) Oral at bedtime    MEDICATIONS  (PRN):  acetaminophen     Tablet .. 650 milliGRAM(s) Oral every 6 hours PRN Temp greater or equal to 38C (100.4F), Mild Pain (1 - 3)  albuterol    90 MICROgram(s) HFA Inhaler 2 Puff(s) Inhalation every 6 hours PRN Shortness of Breath and/or Wheezing  benzonatate 100 milliGRAM(s) Oral three times a day PRN Cough  dextrose Oral Gel 15 Gram(s) Oral once PRN Blood Glucose LESS THAN 70 milliGRAM(s)/deciliter      Vital Signs Last 24 Hrs  T(C): 36.4 (31 May 2023 04:58), Max: 37.1 (30 May 2023 11:24)  T(F): 97.5 (31 May 2023 04:58), Max: 98.7 (30 May 2023 11:24)  HR: 81 (31 May 2023 04:58) (70 - 81)  BP: 151/75 (31 May 2023 04:58) (130/62 - 151/75)  BP(mean): --  RR: 18 (31 May 2023 04:58) (18 - 18)  SpO2: 93% (31 May 2023 04:58) (93% - 98%)    Parameters below as of 31 May 2023 04:58  Patient On (Oxygen Delivery Method): room air        REVIEW OF SYSTEMS:  As per HPI, otherwise unremarkable.     PHYSICAL EXAM:  Constitutional/Appearance: Normal, Well-developed  HEENT:   Normal oral mucosa, no drainage or redness, supple neck  Lymphatic: No lymphadenopathy  Cardiovascular: Normal S1 S2, No edema, II/VI HALLEY  Respiratory: Lungs clear to auscultation, respirations non-labored  Psychiatry: A & O x 3, appropriate affect.   Gastrointestinal:  Soft, Non-tender, no distention  Skin: No rashes, No ecchymoses, No cyanosis	  Neurologic: Non-focal, Alert and oriented x 3  Extremities: Normal range of motion  Vascular: Peripheral pulses palpable 2+ bilaterally (radial)    LABS:  CBC Full  -  ( 31 May 2023 07:12 )  WBC Count : 4.71 K/uL  RBC Count : 3.46 M/uL  Hemoglobin : 9.7 g/dL  Hematocrit : 31.2 %  Platelet Count - Automated : 228 K/uL  Mean Cell Volume : 90.2 fl  Mean Cell Hemoglobin : 28.0 pg  Mean Cell Hemoglobin Concentration : 31.1 gm/dL  Auto Neutrophil # : x  Auto Lymphocyte # : x  Auto Monocyte # : x  Auto Eosinophil # : x  Auto Basophil # : x  Auto Neutrophil % : x  Auto Lymphocyte % : x  Auto Monocyte % : x  Auto Eosinophil % : x  Auto Basophil % : x      05-31    139  |  102  |  15  ----------------------------<  113<H>  3.8   |  27  |  0.97    Ca    9.8      31 May 2023 07:11  Phos  3.3     05-31  Mg     2.0     05-31    TPro  7.7  /  Alb  3.7  /  TBili  0.2  /  DBili  x   /  AST  24  /  ALT  15  /  AlkPhos  109  05-31          IMPRESSION AND PLAN: 73M w CAD (s/p CABG 2004), CHF (s/p BiV ICD), HTN, HL, DM, EtOH use, recent ORIF here w SOB. Activity/Anxiety component, found to have +parainfluenza. TTE with EF 51% with new WMA. Lower suspicion of cardiac etiology given onset with paraflu and recent good exercise tolerance without exertional symptoms/  -tele  -lasix on hold for NASIM, now improved  -cont asa, statin  -cont home coreg  -cont home losartan  -cont amlodipine  -close monitor of symptoms with progression and improvement of paraflu      ***    Alphonso Feliciano MD, MPhil, FACC  Cardiologist, [facility]  ; [facility] [employer] [facility]  email: [email or web address] on-service contact/call information, go to [email or web address] and use [email or web address]" to login.  Outpatient Cardiology appointments, call  [phone] to arrange with a colleague; I do not have outpatient Cardiology clinic.

## 2023-05-31 NOTE — PROGRESS NOTE ADULT - SUBJECTIVE AND OBJECTIVE BOX
***************************************************************  Raina Gongora, PGY 1  Internal Medicine   pager: [phone]  ***************************************************************    NAOMI MORRISSEY  MRN: [id number]    Patient is a 73y old Male who presents with a chief complaint of Shortness of Breath (31 May 2023 08:07)    Subjective: No acute events overnight.      MEDICATIONS  (STANDING):  amLODIPine   Tablet 5 milliGRAM(s) Oral daily  aspirin  chewable 81 milliGRAM(s) Oral daily  atorvastatin 80 milliGRAM(s) Oral at bedtime  carvedilol 25 milliGRAM(s) Oral every 12 hours  dextrose 5%. 1000 milliLiter(s) (100 mL/Hr) IV Continuous <Continuous>  dextrose 5%. 1000 milliLiter(s) (50 mL/Hr) IV Continuous <Continuous>  dextrose 50% Injectable 12.5 Gram(s) IV Push once  dextrose 50% Injectable 25 Gram(s) IV Push once  dextrose 50% Injectable 25 Gram(s) IV Push once  enoxaparin Injectable 40 milliGRAM(s) SubCutaneous every 24 hours  furosemide    Tablet 40 milliGRAM(s) Oral daily  gabapentin 300 milliGRAM(s) Oral two times a day  glucagon  Injectable 1 milliGRAM(s) IntraMuscular once  insulin lispro (ADMELOG) corrective regimen sliding scale   SubCutaneous three times a day before meals  insulin lispro (ADMELOG) corrective regimen sliding scale   SubCutaneous at bedtime  latanoprost 0.005% Ophthalmic Solution 1 Drop(s) Both EYES at bedtime  losartan 50 milliGRAM(s) Oral daily  melatonin 3 milliGRAM(s) Oral at bedtime  polyethylene glycol 3350 17 Gram(s) Oral two times a day  senna 2 Tablet(s) Oral at bedtime  traZODone 100 milliGRAM(s) Oral at bedtime    MEDICATIONS  (PRN):  acetaminophen     Tablet .. 650 milliGRAM(s) Oral every 6 hours PRN Temp greater or equal to 38C (100.4F), Mild Pain (1 - 3)  albuterol    90 MICROgram(s) HFA Inhaler 2 Puff(s) Inhalation every 6 hours PRN Shortness of Breath and/or Wheezing  benzonatate 100 milliGRAM(s) Oral three times a day PRN Cough  dextrose Oral Gel 15 Gram(s) Oral once PRN Blood Glucose LESS THAN 70 milliGRAM(s)/deciliter    Objective:  Vitals: Vital Signs Last 24 Hrs  T(C): 36.4 (05-31-23 @ 04:58), Max: 37.1 (05-30-23 @ 11:24)  T(F): 97.5 (05-31-23 @ 04:58), Max: 98.7 (05-30-23 @ 11:24)  HR: 81 (05-31-23 @ 04:58) (70 - 81)  BP: 151/75 (05-31-23 @ 04:58) (130/62 - 151/75)  RR: 18 (05-31-23 @ 04:58) (18 - 18)  SpO2: 93% (05-31-23 @ 04:58) (93% - 98%)               PHYSICAL EXAM:  General: NAD, resting in bed, on RA  Lungs: clear to auscultation in anterior lung fields, no wheezes/rhonchi/rales  Heart: +s1/s2, RRR, no murmurs/gallops/rubs  Abdomen: soft, non-distended, non-tender to palpation, no rigidity  Extremities: +DP pulse bilaterally, no cyanosis/clubbing/edema    LABS:  05-31    139  |  102  |  15  ----------------------------<  113<H>  3.8   |  27  |  0.97  05-30    141  |  105  |  22  ----------------------------<  96  3.9   |  25  |  1.03  05-29    142  |  104  |  26<H>  ----------------------------<  85  3.9   |  26  |  1.32<H>    Ca    9.8      31 May 2023 07:11  Ca    9.5      30 May 2023 07:11  Ca    8.8      29 May 2023 07:00  Phos  3.3     05-31  Mg     2.0     05-31    TPro  7.7  /  Alb  3.7  /  TBili  0.2  /  DBili  x   /  AST  24  /  ALT  15  /  AlkPhos  109  05-31  TPro  7.2  /  Alb  3.4  /  TBili  0.2  /  DBili  x   /  AST  25  /  ALT  16  /  AlkPhos  100  05-30  TPro  7.0  /  Alb  3.2<L>  /  TBili  0.2  /  DBili  x   /  AST  23  /  ALT  15  /  AlkPhos  93  05-29                        9.7    4.71  )-----------( 228      ( 31 May 2023 07:12 )             31.2                         9.2    4.88  )-----------( 201      ( 30 May 2023 07:11 )             29.4                         9.2    4.13  )-----------( 184      ( 29 May 2023 07:00 )             29.7     CAPILLARY BLOOD GLUCOSE  POCT Blood Glucose.: 125 mg/dL (31 May 2023 07:51)  POCT Blood Glucose.: 130 mg/dL (30 May 2023 21:49)  POCT Blood Glucose.: 126 mg/dL (30 May 2023 16:52)  POCT Blood Glucose.: 119 mg/dL (30 May 2023 11:48)      RADIOLOGY & ADDITIONAL TESTS:  Imaging Personally Reviewed:  [x] YES  [ ] NO    Consultants involved in case:   Consultant(s) Notes Reviewed:  [x] YES  [ ] NO:   Care Discussed with Consultants/Other Providers [x] YES  [ ] NO ***************************************************************  Raina Gongora, PGY 1  Internal Medicine   pager: [phone]  ***************************************************************    NAOMI MORRISSEY  MRN: [id number]    Patient is a 73y old Male who presents with a chief complaint of Shortness of Breath (31 May 2023 08:07)    Subjective: No acute events overnight. Denies CP, SOB a lot improved. Feels better overall.      MEDICATIONS  (STANDING):  amLODIPine   Tablet 5 milliGRAM(s) Oral daily  aspirin  chewable 81 milliGRAM(s) Oral daily  atorvastatin 80 milliGRAM(s) Oral at bedtime  carvedilol 25 milliGRAM(s) Oral every 12 hours  dextrose 5%. 1000 milliLiter(s) (100 mL/Hr) IV Continuous <Continuous>  dextrose 5%. 1000 milliLiter(s) (50 mL/Hr) IV Continuous <Continuous>  dextrose 50% Injectable 12.5 Gram(s) IV Push once  dextrose 50% Injectable 25 Gram(s) IV Push once  dextrose 50% Injectable 25 Gram(s) IV Push once  enoxaparin Injectable 40 milliGRAM(s) SubCutaneous every 24 hours  furosemide    Tablet 40 milliGRAM(s) Oral daily  gabapentin 300 milliGRAM(s) Oral two times a day  glucagon  Injectable 1 milliGRAM(s) IntraMuscular once  insulin lispro (ADMELOG) corrective regimen sliding scale   SubCutaneous three times a day before meals  insulin lispro (ADMELOG) corrective regimen sliding scale   SubCutaneous at bedtime  latanoprost 0.005% Ophthalmic Solution 1 Drop(s) Both EYES at bedtime  losartan 50 milliGRAM(s) Oral daily  melatonin 3 milliGRAM(s) Oral at bedtime  polyethylene glycol 3350 17 Gram(s) Oral two times a day  senna 2 Tablet(s) Oral at bedtime  traZODone 100 milliGRAM(s) Oral at bedtime    MEDICATIONS  (PRN):  acetaminophen     Tablet .. 650 milliGRAM(s) Oral every 6 hours PRN Temp greater or equal to 38C (100.4F), Mild Pain (1 - 3)  albuterol    90 MICROgram(s) HFA Inhaler 2 Puff(s) Inhalation every 6 hours PRN Shortness of Breath and/or Wheezing  benzonatate 100 milliGRAM(s) Oral three times a day PRN Cough  dextrose Oral Gel 15 Gram(s) Oral once PRN Blood Glucose LESS THAN 70 milliGRAM(s)/deciliter    Objective:  Vitals: Vital Signs Last 24 Hrs  T(C): 36.4 (05-31-23 @ 04:58), Max: 37.1 (05-30-23 @ 11:24)  T(F): 97.5 (05-31-23 @ 04:58), Max: 98.7 (05-30-23 @ 11:24)  HR: 81 (05-31-23 @ 04:58) (70 - 81)  BP: 151/75 (05-31-23 @ 04:58) (130/62 - 151/75)  RR: 18 (05-31-23 @ 04:58) (18 - 18)  SpO2: 93% (05-31-23 @ 04:58) (93% - 98%)               PHYSICAL EXAM:  General: NAD, resting in bed, on RA  Lungs: clear to auscultation in anterior lung fields, no wheezes  Heart: +s1/s2, RRR, no murmurs or gallops  Abdomen: soft, non-distended, non-tender to palpation, no rigidity  Extremities: no peripheral edema bilaterally    LABS:  05-31    139  |  102  |  15  ----------------------------<  113<H>  3.8   |  27  |  0.97  05-30    141  |  105  |  22  ----------------------------<  96  3.9   |  25  |  1.03  05-29    142  |  104  |  26<H>  ----------------------------<  85  3.9   |  26  |  1.32<H>    Ca    9.8      31 May 2023 07:11  Ca    9.5      30 May 2023 07:11  Ca    8.8      29 May 2023 07:00  Phos  3.3     05-31  Mg     2.0     05-31    TPro  7.7  /  Alb  3.7  /  TBili  0.2  /  DBili  x   /  AST  24  /  ALT  15  /  AlkPhos  109  05-31  TPro  7.2  /  Alb  3.4  /  TBili  0.2  /  DBili  x   /  AST  25  /  ALT  16  /  AlkPhos  100  05-30  TPro  7.0  /  Alb  3.2<L>  /  TBili  0.2  /  DBili  x   /  AST  23  /  ALT  15  /  AlkPhos  93  05-29                        9.7    4.71  )-----------( 228      ( 31 May 2023 07:12 )             31.2                         9.2    4.88  )-----------( 201      ( 30 May 2023 07:11 )             29.4                         9.2    4.13  )-----------( 184      ( 29 May 2023 07:00 )             29.7     CAPILLARY BLOOD GLUCOSE  POCT Blood Glucose.: 125 mg/dL (31 May 2023 07:51)  POCT Blood Glucose.: 130 mg/dL (30 May 2023 21:49)  POCT Blood Glucose.: 126 mg/dL (30 May 2023 16:52)  POCT Blood Glucose.: 119 mg/dL (30 May 2023 11:48)      RADIOLOGY & ADDITIONAL TESTS:  Imaging Personally Reviewed:  [x] YES  [ ] NO    Consultants involved in case:   Consultant(s) Notes Reviewed:  [x] YES  [ ] NO:   Care Discussed with Consultants/Other Providers [x] YES  [ ] NO

## 2023-05-31 NOTE — DISCHARGE NOTE PROVIDER - HOSPITAL COURSE
73M w/ PMHx CAD s/p CABG (2004), CHF w/ biV ICD w/ recent lead replacement, HTN, DM, HLD, EtOH use, recent admission for ORIF in April, presenting for SOB.     Patient admitted to internal medicine for further management.     #Shortness of breath 2/2 viral PNA  -likely in setting of Parainfluenza, low suspicion for superimposed bacterial process  -CT Chest showed RUL opacity   -on room air  -supportive measures with standing nebs, prn tylenol for pain  -evaluated by Cards, low suspicion for cardiac component, 1621 proBNP, TTE showed EF 51% w/ mod diastolic dysfunction and hypokinetic areas c/w ischemic heart disease; known severe CHF s/p biV ICD placement and recent replacement earlier this year--outpatient cards followup, discussed with Dr. Feliciano  -c/w lasix   -c/w coreg, added back losartan  -low suspicion for DVT/PE despite mildly elevated d-dimer given few risk factors, LE duplex neg   73M w/ PMHx CAD s/p CABG (2004), CHF w/ biV ICD w/ recent lead replacement, HTN, DM, HLD, EtOH use, recent admission for ORIF in April, presenting for SOB.     Patient admitted to internal medicine for further management. Patient found to have viral pneumonia secondary to parainfluenza. There was low suspicion for a superimposed bacterial process. CT     #Shortness of breath 2/2 viral PNA  -likely in setting of Parainfluenza, low suspicion for superimposed bacterial process  -CT Chest showed RUL opacity   -on room air  -supportive measures with standing nebs, prn tylenol for pain  -evaluated by Cards, low suspicion for cardiac component, 1621 proBNP, TTE showed EF 51% w/ mod diastolic dysfunction and hypokinetic areas c/w ischemic heart disease; known severe CHF s/p biV ICD placement and recent replacement earlier this year--outpatient cards followup, discussed with Dr. Feliciano  -c/w lasix   -c/w coreg, added back losartan  -low suspicion for DVT/PE despite mildly elevated d-dimer given few risk factors, LE duplex neg   73M w/ PMHx CAD s/p CABG (2004), CHF w/ biV ICD w/ recent lead replacement, HTN, DM, HLD, EtOH use, recent admission for ORIF in April, presenting for SOB.     Patient admitted to internal medicine for further management. Patient found to have viral pneumonia secondary to parainfluenza. There was low suspicion for a superimposed bacterial process. CT chest showed RUL opacity. Patient was initially on oxygen but weaned off and now saturating well on room air. Patient treated with supportive measures including standing nebulizers and tylenol prn for pain.     Patient was also evaluated by cardiology and there is a low suspicion for a cardiac component of his shortness of breath. TTE showed EF 51% w/ mod diastolic dysfunction and hypokinetic areas c/w ischemic heart disease; known severe CHF s/p biV ICD placement and recent replacement earlier this year. Patient will follow up with cardiology outpatient. DVT study was also negative.     Patient now stable for discharge home with home PT, rolling walker, and close outpatient follow up.

## 2023-05-31 NOTE — DISCHARGE NOTE PROVIDER - NSDCFUADDAPPT_GEN_ALL_CORE_FT
APPTS ARE READY TO BE MADE: [X] YES    Best Family or Patient Contact (if needed):    Additional Information about above appointments (if needed):    1:   2:   3:     Other comments or requests:    APPTS ARE READY TO BE MADE: [X] YES    Best Family or Patient Contact (if needed):    Additional Information about above appointments (if needed):    1:   2:   3:     Other comments or requests:       Patient was advised of follow up requests and asked for scheduling assistance. Will await a call back from Dr. Clement's office to confirm appointment details.     Patient is scheduled with Dr. Izquierdo 6/16 3:00PM [address] [place] [address]

## 2023-05-31 NOTE — PROGRESS NOTE ADULT - PROBLEM SELECTOR PLAN 1
- presenting with acute dyspnea & orthopnea, fluctuating  - associated with myalgias, HA, viral syndrome  - RVP+ parainfluenza  - TTE: EF 51%, mod LV diastolic dysfunction  - ddx viral bronchitis with anxiety component vs less HF exacerbation  > supportive care for viral illness with anti-tussives  > trazadone 100mg qhs  > PRN albuterol q6h for dyspnea/wheezing

## 2023-05-31 NOTE — DISCHARGE NOTE PROVIDER - NSDCFUSCHEDAPPT_GEN_ALL_CORE_FT
Quintin Castrejon  [facility]  ORTHOSURG [address] [facility]  Scheduled Appointment: 06/19/2023    [facility]  ELECTROPH [address]  Scheduled Appointment: 07/25/2023     Denny Izquierdo  [facility]  CARDIOLOGY [address]  Scheduled Appointment: 06/16/2023    Quintin Castrejon  [facility]  ORTHOSURG [address] [facility]  Scheduled Appointment: 06/19/2023    [facility]  ELECTROPH [address]  Scheduled Appointment: 07/25/2023

## 2023-05-31 NOTE — PROGRESS NOTE ADULT - CONVERSATION DETAILS
Goals of care attempted with patient, who prefers to defer to son. When son contacted, he stated that he did not know. Will continue discussion. For now patient is FULL CODE.

## 2023-05-31 NOTE — PROGRESS NOTE ADULT - PROBLEM SELECTOR PLAN 3
- s/p CICU admission in 2014 requiring BiV ICD  - TTE: EF 51%, moderate DD, segmental abnormalities  - s/p IV lasix 40mg with improved dyspnea/orthopnea  > resume home lasix 40mg qd today  > strict I/Os  > daily BMP, maintain K>4, Mg>2 - s/p CICU admission in 2014 requiring BiV ICD  - TTE: EF 51%, moderate DD, segmental abnormalities  - s/p IV lasix 40mg with improved dyspnea/orthopnea  > resume home lasix 40mg qd   > strict I/Os  > daily BMP, maintain K>4, Mg>2

## 2023-05-31 NOTE — PROGRESS NOTE ADULT - PROBLEM SELECTOR PLAN 2
- RVP+ parainfluenza, MRSA negative  - no leukocytosis, mild pro-danica elevation  - CXR clear lungs  - CT chest: RUL small airway opacities  - likely viral process, unlikely superimposed bacterial infxn  > supportive management with robitussin & tessalon perle  > f/u blood culture -- NGTD

## 2023-05-31 NOTE — PROGRESS NOTE ADULT - PROBLEM SELECTOR PLAN 8
- DVT ppx: lovenox  - diet: DASH  - dispo: home likely tomorrow - DVT ppx: lovenox  - diet: DASH  - dispo: home tomorrow

## 2023-05-31 NOTE — DISCHARGE NOTE PROVIDER - NSDCMRMEDTOKEN_GEN_ALL_CORE_FT
acetaminophen 500 mg oral tablet: 1 tab(s) orally every 6 hours as needed for  mild pain (1-3)  aspirin 81 mg oral tablet, chewable: 1 tab(s) orally once a day  atorvastatin 80 mg oral tablet: 1 tab(s) orally once a day (at bedtime)  carvedilol 25 mg oral tablet: 1 tab(s) orally every 12 hours  furosemide 40 mg oral tablet: 1 tab(s) orally once a day  gabapentin 300 mg oral capsule: 1 cap(s) orally 2 times a day  latanoprost 0.005% ophthalmic solution: 1 drop(s) to each affected eye once a day (at bedtime)  losartan 50 mg oral tablet: 1 orally once a day  polyethylene glycol 3350 oral powder for reconstitution: 17 gram(s) orally once a day  repaglinide 1 mg oral tablet: 1 tab(s) orally once a day (before meal)  rolling walker: rolling walker  senna leaf extract oral tablet: 2 tab(s) orally once a day (at bedtime)  traMADol 50 mg oral tablet: 0.5 tab(s) orally every 6 hours As needed Severe Pain (7 - 10)  traZODone 100 mg oral tablet: 1 tab(s) orally once a day (at bedtime)   acetaminophen 500 mg oral tablet: 1 tab(s) orally every 6 hours as needed for  mild pain (1-3)  aspirin 81 mg oral tablet, chewable: 1 tab(s) orally once a day  atorvastatin 80 mg oral tablet: 1 tab(s) orally once a day (at bedtime)  carvedilol 25 mg oral tablet: 1 tab(s) orally every 12 hours  furosemide 40 mg oral tablet: 1 tab(s) orally once a day  gabapentin 300 mg oral capsule: 1 cap(s) orally 2 times a day  latanoprost 0.005% ophthalmic solution: 1 drop(s) to each affected eye once a day (at bedtime)  losartan 50 mg oral tablet: 1 orally once a day  polyethylene glycol 3350 oral powder for reconstitution: 17 gram(s) orally once a day  repaglinide 1 mg oral tablet: 1 tab(s) orally once a day (before meal)  rolling walker: rolling walker  senna leaf extract oral tablet: 2 tab(s) orally once a day (at bedtime)  traZODone 100 mg oral tablet: 1 tab(s) orally once a day (at bedtime)   acetaminophen 325 mg oral tablet: 2 tab(s) orally every 6 hours As needed Temp greater or equal to 38C (100.4F), Mild Pain (1 - 3)  aspirin 81 mg oral tablet, chewable: 1 tab(s) orally once a day  atorvastatin 80 mg oral tablet: 1 tab(s) orally once a day (at bedtime)  carvedilol 25 mg oral tablet: 1 tab(s) orally every 12 hours  furosemide 40 mg oral tablet: 1 tab(s) orally once a day  gabapentin 300 mg oral capsule: 1 cap(s) orally 2 times a day  latanoprost 0.005% ophthalmic solution: 1 drop(s) to each affected eye once a day (at bedtime)  losartan 50 mg oral tablet: 1 tab(s) orally once a day  polyethylene glycol 3350 oral powder for reconstitution: 17 gram(s) orally 2 times a day  Proventil HFA 90 mcg/inh inhalation aerosol: 2 puff(s) inhaled every 6 hours as needed for  shortness of breath and/or wheezing  repaglinide 1 mg oral tablet: 1 tab(s) orally once a day (before meal)  rolling walker: rolling walker  senna leaf extract oral tablet: 2 tab(s) orally once a day (at bedtime)  Tessalon Perles 100 mg oral capsule: 1 cap(s) orally 3 times a day as needed for  cough  traZODone 100 mg oral tablet: 1 tab(s) orally once a day (at bedtime)

## 2023-05-31 NOTE — DISCHARGE NOTE PROVIDER - CARE PROVIDER_API CALL
Pathology here to assess samples  
Patient positioned supine in cart, patient monitored. Patient prepped and draped per unit standard.    Safety straps applied:N/A r/t procedure position in cart  
Patient tolerated procedure well  Dressing clean dry and intact  Patient ok to d/c immediately per Dr. Ryan  
Procedure start right thyroid nodule fine needle aspiration  
John Feliciano.  Cardiovascular Disease  [address] [place] [id number]  Phone: [phone]  Fax: [phone]  Follow Up Time: 1 week    Erik Clement  [address] [place] [address]  Phone: [phone]  Follow Up Time: 1 week

## 2023-05-31 NOTE — DISCHARGE NOTE PROVIDER - NSDCCPCAREPLAN_GEN_ALL_CORE_FT
PRINCIPAL DISCHARGE DIAGNOSIS  Diagnosis: Pneumonia  Assessment and Plan of Treatment: You came to the hospital because you were having shortness of breath, and you were found to have pneumonia secondary to a virus called parainfluenza. While you were in the hospital we treated you with nebulizers and your symptoms improved. We also had a cardiologist see you to rule out cardiac causes of your shortness of breath. When you leave the hospital, please follow up with your PCP and cardiologist for further managmenet. If you have worsening shortness of breath at home not controlled by your medications return to the ED for treatment.

## 2023-06-01 VITALS
HEART RATE: 78 BPM | TEMPERATURE: 98 F | RESPIRATION RATE: 18 BRPM | DIASTOLIC BLOOD PRESSURE: 78 MMHG | OXYGEN SATURATION: 97 % | SYSTOLIC BLOOD PRESSURE: 141 MMHG

## 2023-06-01 LAB
ALBUMIN SERPL ELPH-MCNC: 3.6 G/DL — SIGNIFICANT CHANGE UP (ref 3.3–5)
ALP SERPL-CCNC: 104 U/L — SIGNIFICANT CHANGE UP (ref 40–120)
ALT FLD-CCNC: 15 U/L — SIGNIFICANT CHANGE UP (ref 10–45)
ANION GAP SERPL CALC-SCNC: 11 MMOL/L — SIGNIFICANT CHANGE UP (ref 5–17)
AST SERPL-CCNC: 21 U/L — SIGNIFICANT CHANGE UP (ref 10–40)
BILIRUB SERPL-MCNC: 0.2 MG/DL — SIGNIFICANT CHANGE UP (ref 0.2–1.2)
BUN SERPL-MCNC: 13 MG/DL — SIGNIFICANT CHANGE UP (ref 7–23)
CALCIUM SERPL-MCNC: 9.7 MG/DL — SIGNIFICANT CHANGE UP (ref 8.4–10.5)
CHLORIDE SERPL-SCNC: 101 MMOL/L — SIGNIFICANT CHANGE UP (ref 96–108)
CO2 SERPL-SCNC: 25 MMOL/L — SIGNIFICANT CHANGE UP (ref 22–31)
CREAT SERPL-MCNC: 0.94 MG/DL — SIGNIFICANT CHANGE UP (ref 0.5–1.3)
CULTURE RESULTS: SIGNIFICANT CHANGE UP
CULTURE RESULTS: SIGNIFICANT CHANGE UP
EGFR: 85 ML/MIN/1.73M2 — SIGNIFICANT CHANGE UP
GLUCOSE BLDC GLUCOMTR-MCNC: 171 MG/DL — HIGH (ref 70–99)
GLUCOSE SERPL-MCNC: 109 MG/DL — HIGH (ref 70–99)
HCT VFR BLD CALC: 31 % — LOW (ref 39–50)
HGB BLD-MCNC: 9.9 G/DL — LOW (ref 13–17)
MAGNESIUM SERPL-MCNC: 2 MG/DL — SIGNIFICANT CHANGE UP (ref 1.6–2.6)
MCHC RBC-ENTMCNC: 28.6 PG — SIGNIFICANT CHANGE UP (ref 27–34)
MCHC RBC-ENTMCNC: 31.9 GM/DL — LOW (ref 32–36)
MCV RBC AUTO: 89.6 FL — SIGNIFICANT CHANGE UP (ref 80–100)
NRBC # BLD: 0 /100 WBCS — SIGNIFICANT CHANGE UP (ref 0–0)
PHOSPHATE SERPL-MCNC: 3.2 MG/DL — SIGNIFICANT CHANGE UP (ref 2.5–4.5)
PLATELET # BLD AUTO: 256 K/UL — SIGNIFICANT CHANGE UP (ref 150–400)
POTASSIUM SERPL-MCNC: 4 MMOL/L — SIGNIFICANT CHANGE UP (ref 3.5–5.3)
POTASSIUM SERPL-SCNC: 4 MMOL/L — SIGNIFICANT CHANGE UP (ref 3.5–5.3)
PROT SERPL-MCNC: 7.4 G/DL — SIGNIFICANT CHANGE UP (ref 6–8.3)
RBC # BLD: 3.46 M/UL — LOW (ref 4.2–5.8)
RBC # FLD: 13.7 % — SIGNIFICANT CHANGE UP (ref 10.3–14.5)
SODIUM SERPL-SCNC: 137 MMOL/L — SIGNIFICANT CHANGE UP (ref 135–145)
SPECIMEN SOURCE: SIGNIFICANT CHANGE UP
SPECIMEN SOURCE: SIGNIFICANT CHANGE UP
WBC # BLD: 5.09 K/UL — SIGNIFICANT CHANGE UP (ref 3.8–10.5)
WBC # FLD AUTO: 5.09 K/UL — SIGNIFICANT CHANGE UP (ref 3.8–10.5)

## 2023-06-01 PROCEDURE — 97110 THERAPEUTIC EXERCISES: CPT

## 2023-06-01 PROCEDURE — 97530 THERAPEUTIC ACTIVITIES: CPT

## 2023-06-01 PROCEDURE — C8929: CPT

## 2023-06-01 PROCEDURE — 87040 BLOOD CULTURE FOR BACTERIA: CPT

## 2023-06-01 PROCEDURE — 86850 RBC ANTIBODY SCREEN: CPT

## 2023-06-01 PROCEDURE — 99239 HOSP IP/OBS DSCHRG MGMT >30: CPT | Mod: GC

## 2023-06-01 PROCEDURE — 87640 STAPH A DNA AMP PROBE: CPT

## 2023-06-01 PROCEDURE — 82962 GLUCOSE BLOOD TEST: CPT

## 2023-06-01 PROCEDURE — 97116 GAIT TRAINING THERAPY: CPT

## 2023-06-01 PROCEDURE — 71045 X-RAY EXAM CHEST 1 VIEW: CPT

## 2023-06-01 PROCEDURE — 86901 BLOOD TYPING SEROLOGIC RH(D): CPT

## 2023-06-01 PROCEDURE — 85027 COMPLETE CBC AUTOMATED: CPT

## 2023-06-01 PROCEDURE — 87641 MR-STAPH DNA AMP PROBE: CPT

## 2023-06-01 PROCEDURE — 93005 ELECTROCARDIOGRAM TRACING: CPT

## 2023-06-01 PROCEDURE — 97162 PT EVAL MOD COMPLEX 30 MIN: CPT

## 2023-06-01 PROCEDURE — 84145 PROCALCITONIN (PCT): CPT

## 2023-06-01 PROCEDURE — 86900 BLOOD TYPING SEROLOGIC ABO: CPT

## 2023-06-01 PROCEDURE — 99285 EMERGENCY DEPT VISIT HI MDM: CPT | Mod: 25

## 2023-06-01 PROCEDURE — 0225U NFCT DS DNA&RNA 21 SARSCOV2: CPT

## 2023-06-01 PROCEDURE — 36415 COLL VENOUS BLD VENIPUNCTURE: CPT

## 2023-06-01 PROCEDURE — 84484 ASSAY OF TROPONIN QUANT: CPT

## 2023-06-01 PROCEDURE — 84100 ASSAY OF PHOSPHORUS: CPT

## 2023-06-01 PROCEDURE — 93970 EXTREMITY STUDY: CPT

## 2023-06-01 PROCEDURE — 96374 THER/PROPH/DIAG INJ IV PUSH: CPT

## 2023-06-01 PROCEDURE — 94640 AIRWAY INHALATION TREATMENT: CPT

## 2023-06-01 PROCEDURE — 71250 CT THORAX DX C-: CPT | Mod: MA

## 2023-06-01 PROCEDURE — 83880 ASSAY OF NATRIURETIC PEPTIDE: CPT

## 2023-06-01 PROCEDURE — 85379 FIBRIN DEGRADATION QUANT: CPT

## 2023-06-01 PROCEDURE — 99233 SBSQ HOSP IP/OBS HIGH 50: CPT

## 2023-06-01 PROCEDURE — 96375 TX/PRO/DX INJ NEW DRUG ADDON: CPT

## 2023-06-01 PROCEDURE — 83735 ASSAY OF MAGNESIUM: CPT

## 2023-06-01 PROCEDURE — 80053 COMPREHEN METABOLIC PANEL: CPT

## 2023-06-01 RX ORDER — POLYETHYLENE GLYCOL 3350 17 G/17G
17 POWDER, FOR SOLUTION ORAL
Qty: 0 | Refills: 0 | DISCHARGE
Start: 2023-06-01

## 2023-06-01 RX ORDER — SENNA PLUS 8.6 MG/1
2 TABLET ORAL
Qty: 0 | Refills: 0 | DISCHARGE
Start: 2023-06-01

## 2023-06-01 RX ORDER — ALBUTEROL 90 UG/1
2 AEROSOL, METERED ORAL
Qty: 1 | Refills: 0
Start: 2023-06-01 | End: 2023-06-30

## 2023-06-01 RX ORDER — ASPIRIN/CALCIUM CARB/MAGNESIUM 324 MG
1 TABLET ORAL
Qty: 0 | Refills: 0 | DISCHARGE
Start: 2023-06-01

## 2023-06-01 RX ORDER — FUROSEMIDE 40 MG
1 TABLET ORAL
Qty: 0 | Refills: 0 | DISCHARGE
Start: 2023-06-01

## 2023-06-01 RX ORDER — LOSARTAN POTASSIUM 100 MG/1
1 TABLET, FILM COATED ORAL
Qty: 0 | Refills: 0 | DISCHARGE

## 2023-06-01 RX ORDER — ACETAMINOPHEN 500 MG
2 TABLET ORAL
Qty: 0 | Refills: 0 | DISCHARGE
Start: 2023-06-01

## 2023-06-01 RX ORDER — TRAZODONE HCL 50 MG
1 TABLET ORAL
Qty: 0 | Refills: 0 | DISCHARGE
Start: 2023-06-01

## 2023-06-01 RX ORDER — LOSARTAN POTASSIUM 100 MG/1
1 TABLET, FILM COATED ORAL
Qty: 0 | Refills: 0 | DISCHARGE
Start: 2023-06-01

## 2023-06-01 RX ORDER — LATANOPROST 0.05 MG/ML
1 SOLUTION/ DROPS OPHTHALMIC; TOPICAL
Qty: 0 | Refills: 0 | DISCHARGE
Start: 2023-06-01

## 2023-06-01 RX ORDER — GABAPENTIN 400 MG/1
1 CAPSULE ORAL
Qty: 0 | Refills: 0 | DISCHARGE
Start: 2023-06-01

## 2023-06-01 RX ORDER — ATORVASTATIN CALCIUM 80 MG/1
1 TABLET, FILM COATED ORAL
Qty: 0 | Refills: 0 | DISCHARGE
Start: 2023-06-01

## 2023-06-01 RX ADMIN — GABAPENTIN 300 MILLIGRAM(S): 400 CAPSULE ORAL at 05:19

## 2023-06-01 RX ADMIN — Medication 40 MILLIGRAM(S): at 05:19

## 2023-06-01 RX ADMIN — CARVEDILOL PHOSPHATE 25 MILLIGRAM(S): 80 CAPSULE, EXTENDED RELEASE ORAL at 05:19

## 2023-06-01 RX ADMIN — AMLODIPINE BESYLATE 5 MILLIGRAM(S): 2.5 TABLET ORAL at 05:19

## 2023-06-01 RX ADMIN — Medication 81 MILLIGRAM(S): at 11:24

## 2023-06-01 RX ADMIN — Medication 650 MILLIGRAM(S): at 11:23

## 2023-06-01 RX ADMIN — LOSARTAN POTASSIUM 50 MILLIGRAM(S): 100 TABLET, FILM COATED ORAL at 05:19

## 2023-06-01 NOTE — PROGRESS NOTE ADULT - ASSESSMENT
73M with hx of CAD s/p CABG in 2004, CHF with biV ICD, HTN, HLD, DM, EtOH use, recent ORIF on 4/29/23 s/p fall presenting from rehab for shortness of breath likely iso viral bronchitis. 
no

## 2023-06-01 NOTE — PROGRESS NOTE ADULT - PROBLEM SELECTOR PLAN 8
- DVT ppx: lovenox  - diet: DASH  - dispo: home likely tomorrow - DVT ppx: lovenox  - diet: DASH  - dispo: home

## 2023-06-01 NOTE — PROGRESS NOTE ADULT - SUBJECTIVE AND OBJECTIVE BOX
INTERVAL EVENTS/SUBJ:  Symptoms improving    Home Medications:  acetaminophen 500 mg oral tablet: 1 tab(s) orally every 6 hours as needed for  mild pain (1-3) (26 May 2023 15:43)  aspirin 81 mg oral tablet, chewable: 1 tab(s) orally once a day (26 May 2023 15:43)  atorvastatin 80 mg oral tablet: 1 tab(s) orally once a day (at bedtime) (26 May 2023 15:43)  carvedilol 25 mg oral tablet: 1 tab(s) orally every 12 hours (26 May 2023 15:43)  furosemide 40 mg oral tablet: 1 tab(s) orally once a day (26 May 2023 15:45)  gabapentin 300 mg oral capsule: 1 cap(s) orally 2 times a day (26 May 2023 15:45)  latanoprost 0.005% ophthalmic solution: 1 drop(s) to each affected eye once a day (at bedtime) (26 May 2023 15:45)  losartan 50 mg oral tablet: 1 orally once a day (26 May 2023 15:45)  polyethylene glycol 3350 oral powder for reconstitution: 17 gram(s) orally once a day (26 May 2023 15:45)  repaglinide 1 mg oral tablet: 1 tab(s) orally once a day (before meal) (26 May 2023 15:45)  senna leaf extract oral tablet: 2 tab(s) orally once a day (at bedtime) (26 May 2023 15:45)  traMADol 50 mg oral tablet: 0.5 tab(s) orally every 6 hours As needed Severe Pain (7 - 10) (26 May 2023 15:45)  traZODone 100 mg oral tablet: 1 tab(s) orally once a day (at bedtime) (26 May 2023 15:45)      MEDICATIONS  (STANDING):  amLODIPine   Tablet 5 milliGRAM(s) Oral daily  aspirin  chewable 81 milliGRAM(s) Oral daily  atorvastatin 80 milliGRAM(s) Oral at bedtime  carvedilol 25 milliGRAM(s) Oral every 12 hours  dextrose 5%. 1000 milliLiter(s) (50 mL/Hr) IV Continuous <Continuous>  dextrose 5%. 1000 milliLiter(s) (100 mL/Hr) IV Continuous <Continuous>  dextrose 50% Injectable 12.5 Gram(s) IV Push once  dextrose 50% Injectable 25 Gram(s) IV Push once  dextrose 50% Injectable 25 Gram(s) IV Push once  enoxaparin Injectable 40 milliGRAM(s) SubCutaneous every 24 hours  furosemide    Tablet 40 milliGRAM(s) Oral daily  gabapentin 300 milliGRAM(s) Oral two times a day  glucagon  Injectable 1 milliGRAM(s) IntraMuscular once  insulin lispro (ADMELOG) corrective regimen sliding scale   SubCutaneous at bedtime  insulin lispro (ADMELOG) corrective regimen sliding scale   SubCutaneous three times a day before meals  latanoprost 0.005% Ophthalmic Solution 1 Drop(s) Both EYES at bedtime  losartan 50 milliGRAM(s) Oral daily  melatonin 3 milliGRAM(s) Oral at bedtime  polyethylene glycol 3350 17 Gram(s) Oral two times a day  senna 2 Tablet(s) Oral at bedtime  traZODone 100 milliGRAM(s) Oral at bedtime    MEDICATIONS  (PRN):  acetaminophen     Tablet .. 650 milliGRAM(s) Oral every 6 hours PRN Temp greater or equal to 38C (100.4F), Mild Pain (1 - 3)  albuterol    90 MICROgram(s) HFA Inhaler 2 Puff(s) Inhalation every 6 hours PRN Shortness of Breath and/or Wheezing  benzonatate 100 milliGRAM(s) Oral three times a day PRN Cough  dextrose Oral Gel 15 Gram(s) Oral once PRN Blood Glucose LESS THAN 70 milliGRAM(s)/deciliter      Vital Signs Last 24 Hrs  T(C): 36.7 (01 Jun 2023 04:59), Max: 37.1 (31 May 2023 21:00)  T(F): 98 (01 Jun 2023 04:59), Max: 98.7 (31 May 2023 21:00)  HR: 71 (01 Jun 2023 04:59) (71 - 81)  BP: 165/83 (01 Jun 2023 04:59) (137/69 - 165/83)  BP(mean): --  RR: 18 (01 Jun 2023 04:59) (18 - 18)  SpO2: 93% (01 Jun 2023 04:59) (93% - 96%)    Parameters below as of 01 Jun 2023 04:59  Patient On (Oxygen Delivery Method): room air        REVIEW OF SYSTEMS:  As per HPI, otherwise unremarkable.     PHYSICAL EXAM:  Constitutional/Appearance: Normal, Well-developed  HEENT:   Normal oral mucosa, no drainage or redness, supple neck  Lymphatic: No lymphadenopathy  Cardiovascular: Normal S1 S2, No edema, II/VI HALLEY  Respiratory: Lungs clear to auscultation, respirations non-labored  Psychiatry: A & O x 3, appropriate affect.   Gastrointestinal:  Soft, Non-tender, no distention  Skin: No rashes, No ecchymoses, No cyanosis	  Neurologic: Non-focal, Alert and oriented x 3  Extremities: Normal range of motion  Vascular: Peripheral pulses palpable 2+ bilaterally (radial)    LABS:  CBC Full  -  ( 01 Jun 2023 06:55 )  WBC Count : 5.09 K/uL  RBC Count : 3.46 M/uL  Hemoglobin : 9.9 g/dL  Hematocrit : 31.0 %  Platelet Count - Automated : 256 K/uL  Mean Cell Volume : 89.6 fl  Mean Cell Hemoglobin : 28.6 pg  Mean Cell Hemoglobin Concentration : 31.9 gm/dL  Auto Neutrophil # : x  Auto Lymphocyte # : x  Auto Monocyte # : x  Auto Eosinophil # : x  Auto Basophil # : x  Auto Neutrophil % : x  Auto Lymphocyte % : x  Auto Monocyte % : x  Auto Eosinophil % : x  Auto Basophil % : x      06-01    137  |  101  |  13  ----------------------------<  109<H>  4.0   |  25  |  0.94    Ca    9.7      01 Jun 2023 06:55  Phos  3.2     06-01  Mg     2.0     06-01    TPro  7.4  /  Alb  3.6  /  TBili  0.2  /  DBili  x   /  AST  21  /  ALT  15  /  AlkPhos  104  06-01      IMPRESSION AND PLAN: 73M w CAD (s/p CABG 2004), CHF (s/p BiV ICD), HTN, HL, DM, EtOH use, recent ORIF here w SOB. Activity/Anxiety component, found to have +parainfluenza. TTE with EF 51% with new WMA. Lower suspicion of cardiac etiology given onset with paraflu and recent good exercise tolerance without exertional symptoms  -tele  -lasix on hold for NASIM, now improved  -cont asa, statin  -cont home coreg  -cont home losartan  -cont amlodipine  -close monitor of symptoms with progression and improvement of paraflu      ***    Alphonso Feliciano MD, MPhil, [facility]  Cardiologist, [facility]  ; [employer] [facility]  email: [email or web address] on-service contact/call information, go to [email or web address] and use "[email or web address]" to login.  Outpatient Cardiology appointments, call  [phone] to arrange with a colleague; I do not have outpatient Cardiology clinic.

## 2023-06-01 NOTE — PROGRESS NOTE ADULT - ATTENDING COMMENTS
73M w/ PMHx CAD s/p CABG (2004), CHF w/ biV ICD w/ recent lead replacement, HTN, DM, HLD, EtOH use, recent admission for ORIF in April, presenting for SOB.     #Shortness of breath 2/2 viral PNA  -likely in setting of Parainfluenza, low suspicion for superimposed bacterial process  -CT Chest showed RUL opacity   -on 2L 98%, wean as tolerated  -supportive measures with standing nebs, prn tylenol for pain  -urine legionella given pt reporting AC usage  -r/o HF component, 1621 proBNP  -TTE showed EF 51% w/ mod diastolic dysfunction and hypokinetic areas c/w ischemic heart disease; known severe CHF s/p biV ICD placement and recent replacement earlier this year  -transition from IV lasix to po, daily weights, strict I/Os  -c/w coreg and losartan    PT rec ZONIA    Rest of plan as above, d/w HS 8
73M w/ PMHx CAD s/p CABG (2004), CHF w/ biV ICD w/ recent lead replacement, HTN, DM, HLD, EtOH use, recent admission for ORIF in April, presenting for SOB.     #Shortness of breath 2/2 viral PNA  -likely in setting of Parainfluenza, low suspicion for superimposed bacterial process  -CT Chest showed RUL opacity   -on 2L 98%, wean as tolerated  -supportive measures with standing nebs, prn tylenol for pain  -urine legionella given pt reporting AC usage  -r/o HF component, 1621 proBNP  -TTE showed EF 51% w/ mod diastolic dysfunction and hypokinetic areas c/w ischemic heart disease; known severe CHF s/p biV ICD placement and recent replacement earlier this year  -lasix 40 IV daily for now, daily weights, strict I/Os  -c/w coreg and losartan    PT rec ZONIA    Rest of plan as above, d/w HS 8
73M w/ PMHx CAD s/p CABG (2004), CHF w/ biV ICD w/ recent lead replacement, HTN, DM, HLD, EtOH use, recent admission for ORIF in April, presenting for SOB.     #Shortness of breath 2/2 viral PNA  -likely in setting of Parainfluenza, low suspicion for superimposed bacterial process  -on room air  -supportive measures with standing nebs, prn tylenol for pain; dc with prn albuterol  -evaluated by Cards, low suspicion for cardiac component, 1621 proBNP, TTE showed EF 51% w/ mod diastolic dysfunction and hypokinetic areas c/w ischemic heart disease; known severe CHF s/p biV ICD placement and recent replacement earlier this year--outpatient cards followup, discussed with Dr. Feliciano  -c/w lasix   -c/w coreg, losartan  -low suspicion for DVT/PE despite mildly elevated d-dimer given few risk factors, LE duplex neg    Dispo planning with home PT and RW once DME delivered. dc today  Rest of plan as above, d/w HS 8 .
73M w/ PMHx CAD s/p CABG (2004), CHF w/ biV ICD w/ recent lead replacement, HTN, DM, HLD, EtOH use, recent admission for ORIF in April, presenting for SOB.     #Shortness of breath 2/2 viral PNA  -likely in setting of Parainfluenza, low suspicion for superimposed bacterial process  -CT Chest showed RUL opacity   -on room air  -supportive measures with standing nebs, prn tylenol for pain  -evaluated by Cards, low suspicion for cardiac component, 1621 proBNP, TTE showed EF 51% w/ mod diastolic dysfunction and hypokinetic areas c/w ischemic heart disease; known severe CHF s/p biV ICD placement and recent replacement earlier this year--outpatient cards followup, discussed with Dr. Feliciano  -c/w lasix   -c/w coreg, added back losartan  -low suspicion for DVT/PE despite mildly elevated d-dimer given few risk factors, LE duplex neg    Dispo planning with home PT and RW once DME delivered. Likely d/c tomorrow, d/w SW    Rest of plan as above, d/w HS 8 .
73M w/ PMHx CAD s/p CABG (2004), CHF w/ biV ICD w/ recent lead replacement, HTN, DM, HLD, EtOH use, recent admission for ORIF in April, presenting for SOB.     #Shortness of breath 2/2 viral PNA  -likely in setting of Parainfluenza, low suspicion for superimposed bacterial process  -CT Chest showed RUL opacity   -on 2L 98%, wean as tolerated  -supportive measures with standing nebs, prn tylenol for pain  -r/o HF component, 1621 proBNP  -TTE showed EF 51% w/ mod diastolic dysfunction and hypokinetic areas c/w ischemic heart disease; known severe CHF s/p biV ICD placement and recent replacement earlier this year  -hold lasix in the setting of NASIM  -c/w coreg (holding losartan due to NASIM)    PT rec ZONIA    Rest of plan as above, d/w HS 8
73M w/ PMHx CAD s/p CABG (2004), CHF w/ biV ICD w/ recent lead replacement, HTN, DM, HLD, EtOH use, recent admission for ORIF in April, presenting for SOB.     #Shortness of breath 2/2 viral PNA  -likely in setting of Parainfluenza, low suspicion for superimposed bacterial process  -CT Chest showed RUL opacity   -wean O2 as tolerated  -supportive measures with standing nebs, prn tylenol for pain  -evaluated by Cards, low suspicion for cardiac component, 1621 proBNP, TTE showed EF 51% w/ mod diastolic dysfunction and hypokinetic areas c/w ischemic heart disease; known severe CHF s/p biV ICD placement and recent replacement earlier this year--outpatient cards followup, discussed with Dr. Feliciano today  -restart PO lasix  -c/w coreg (holding losartan due to NASIM, can likely restart tomorrow if Cr stable)  -low suspicion for DVT, LE duplex neg    Dispo planning with home PT and RW pending O2 optimization and restarting home BP meds     Rest of plan as above, d/w HS 8 .

## 2023-06-01 NOTE — PROGRESS NOTE ADULT - PROVIDER SPECIALTY LIST ADULT
Cardiology
Cardiology
Internal Medicine

## 2023-06-01 NOTE — PROGRESS NOTE ADULT - PROBLEM SELECTOR PLAN 6
- on home losartan 50mg qd & coreg 25mg bid  - hypertensive to 180s, likely iso missed meds   > continue home coreg  > will resume losartan tomorrow iso recent NASIM  > start amlodipine 5mg qd - on home losartan 50mg qd & coreg 25mg bid  - hypertensive to 180s, likely iso missed meds   > continue home coreg  > will resume losartan tomorrow iso recent NASIM  > d/c amlodipine on discharge

## 2023-06-01 NOTE — PROGRESS NOTE ADULT - SUBJECTIVE AND OBJECTIVE BOX
PROGRESS NOTE:     Patient is a 73y old  Male who presents with a chief complaint of Shortness of Breath (31 May 2023 11:32)      SUBJECTIVE / OVERNIGHT EVENTS:    ADDITIONAL REVIEW OF SYSTEMS:    MEDICATIONS  (STANDING):  amLODIPine   Tablet 5 milliGRAM(s) Oral daily  aspirin  chewable 81 milliGRAM(s) Oral daily  atorvastatin 80 milliGRAM(s) Oral at bedtime  carvedilol 25 milliGRAM(s) Oral every 12 hours  dextrose 5%. 1000 milliLiter(s) (100 mL/Hr) IV Continuous <Continuous>  dextrose 5%. 1000 milliLiter(s) (50 mL/Hr) IV Continuous <Continuous>  dextrose 50% Injectable 25 Gram(s) IV Push once  dextrose 50% Injectable 25 Gram(s) IV Push once  dextrose 50% Injectable 12.5 Gram(s) IV Push once  enoxaparin Injectable 40 milliGRAM(s) SubCutaneous every 24 hours  furosemide    Tablet 40 milliGRAM(s) Oral daily  gabapentin 300 milliGRAM(s) Oral two times a day  glucagon  Injectable 1 milliGRAM(s) IntraMuscular once  insulin lispro (ADMELOG) corrective regimen sliding scale   SubCutaneous three times a day before meals  insulin lispro (ADMELOG) corrective regimen sliding scale   SubCutaneous at bedtime  latanoprost 0.005% Ophthalmic Solution 1 Drop(s) Both EYES at bedtime  losartan 50 milliGRAM(s) Oral daily  melatonin 3 milliGRAM(s) Oral at bedtime  polyethylene glycol 3350 17 Gram(s) Oral two times a day  senna 2 Tablet(s) Oral at bedtime  traZODone 100 milliGRAM(s) Oral at bedtime    MEDICATIONS  (PRN):  acetaminophen     Tablet .. 650 milliGRAM(s) Oral every 6 hours PRN Temp greater or equal to 38C (100.4F), Mild Pain (1 - 3)  albuterol    90 MICROgram(s) HFA Inhaler 2 Puff(s) Inhalation every 6 hours PRN Shortness of Breath and/or Wheezing  benzonatate 100 milliGRAM(s) Oral three times a day PRN Cough  dextrose Oral Gel 15 Gram(s) Oral once PRN Blood Glucose LESS THAN 70 milliGRAM(s)/deciliter      CAPILLARY BLOOD GLUCOSE      POCT Blood Glucose.: 130 mg/dL (31 May 2023 20:56)  POCT Blood Glucose.: 104 mg/dL (31 May 2023 17:19)  POCT Blood Glucose.: 122 mg/dL (31 May 2023 12:06)    I&O's Summary    31 May 2023 07:01  -  01 Jun 2023 07:00  --------------------------------------------------------  IN: 240 mL / OUT: 0 mL / NET: 240 mL        PHYSICAL EXAM:  Vital Signs Last 24 Hrs  T(C): 36.7 (01 Jun 2023 04:59), Max: 37.1 (31 May 2023 21:00)  T(F): 98 (01 Jun 2023 04:59), Max: 98.7 (31 May 2023 21:00)  HR: 71 (01 Jun 2023 04:59) (71 - 81)  BP: 165/83 (01 Jun 2023 04:59) (137/69 - 165/83)  BP(mean): --  RR: 18 (01 Jun 2023 04:59) (18 - 18)  SpO2: 93% (01 Jun 2023 04:59) (93% - 96%)    Parameters below as of 01 Jun 2023 04:59  Patient On (Oxygen Delivery Method): room air        CONSTITUTIONAL: NAD, well-developed  RESPIRATORY: Normal respiratory effort; lungs are clear to auscultation bilaterally  CARDIOVASCULAR: Regular rate and rhythm, normal S1 and S2, no murmur/rub/gallop; No lower extremity edema; Peripheral pulses are 2+ bilaterally  ABDOMEN: Nontender to palpation, normoactive bowel sounds, no rebound/guarding  MUSCULOSKELETAL: no clubbing or cyanosis of digits; no joint swelling or tenderness to palpation  PSYCH: A+O to person, place, and time; affect appropriate    LABS:                        9.9    5.09  )-----------( 256      ( 01 Jun 2023 06:55 )             31.0     06-01    137  |  101  |  13  ----------------------------<  109<H>  4.0   |  25  |  0.94    Ca    9.7      01 Jun 2023 06:55  Phos  3.2     06-01  Mg     2.0     06-01    TPro  7.4  /  Alb  3.6  /  TBili  0.2  /  DBili  x   /  AST  21  /  ALT  15  /  AlkPhos  104  06-01                RADIOLOGY & ADDITIONAL TESTS:  Results Reviewed:   Imaging Personally Reviewed:  Electrocardiogram Personally Reviewed:    COORDINATION OF CARE:  Care Discussed with Consultants/Other Providers [Y/N]:  Prior or Outpatient Records Reviewed [Y/N]:   PROGRESS NOTE:     Patient is a 73y old  Male who presents with a chief complaint of Shortness of Breath (31 May 2023 11:32)      SUBJECTIVE / OVERNIGHT EVENTS: No acute events overnight. This AM patient seen and examined at bedside. Patient feeling well. Eagerly awaiting discharge.     ADDITIONAL REVIEW OF SYSTEMS: Negative     MEDICATIONS  (STANDING):  amLODIPine   Tablet 5 milliGRAM(s) Oral daily  aspirin  chewable 81 milliGRAM(s) Oral daily  atorvastatin 80 milliGRAM(s) Oral at bedtime  carvedilol 25 milliGRAM(s) Oral every 12 hours  dextrose 5%. 1000 milliLiter(s) (100 mL/Hr) IV Continuous <Continuous>  dextrose 5%. 1000 milliLiter(s) (50 mL/Hr) IV Continuous <Continuous>  dextrose 50% Injectable 25 Gram(s) IV Push once  dextrose 50% Injectable 25 Gram(s) IV Push once  dextrose 50% Injectable 12.5 Gram(s) IV Push once  enoxaparin Injectable 40 milliGRAM(s) SubCutaneous every 24 hours  furosemide    Tablet 40 milliGRAM(s) Oral daily  gabapentin 300 milliGRAM(s) Oral two times a day  glucagon  Injectable 1 milliGRAM(s) IntraMuscular once  insulin lispro (ADMELOG) corrective regimen sliding scale   SubCutaneous three times a day before meals  insulin lispro (ADMELOG) corrective regimen sliding scale   SubCutaneous at bedtime  latanoprost 0.005% Ophthalmic Solution 1 Drop(s) Both EYES at bedtime  losartan 50 milliGRAM(s) Oral daily  melatonin 3 milliGRAM(s) Oral at bedtime  polyethylene glycol 3350 17 Gram(s) Oral two times a day  senna 2 Tablet(s) Oral at bedtime  traZODone 100 milliGRAM(s) Oral at bedtime    MEDICATIONS  (PRN):  acetaminophen     Tablet .. 650 milliGRAM(s) Oral every 6 hours PRN Temp greater or equal to 38C (100.4F), Mild Pain (1 - 3)  albuterol    90 MICROgram(s) HFA Inhaler 2 Puff(s) Inhalation every 6 hours PRN Shortness of Breath and/or Wheezing  benzonatate 100 milliGRAM(s) Oral three times a day PRN Cough  dextrose Oral Gel 15 Gram(s) Oral once PRN Blood Glucose LESS THAN 70 milliGRAM(s)/deciliter      CAPILLARY BLOOD GLUCOSE      POCT Blood Glucose.: 130 mg/dL (31 May 2023 20:56)  POCT Blood Glucose.: 104 mg/dL (31 May 2023 17:19)  POCT Blood Glucose.: 122 mg/dL (31 May 2023 12:06)    I&O's Summary    31 May 2023 07:01  -  01 Jun 2023 07:00  --------------------------------------------------------  IN: 240 mL / OUT: 0 mL / NET: 240 mL        PHYSICAL EXAM:  Vital Signs Last 24 Hrs  T(C): 36.7 (01 Jun 2023 04:59), Max: 37.1 (31 May 2023 21:00)  T(F): 98 (01 Jun 2023 04:59), Max: 98.7 (31 May 2023 21:00)  HR: 71 (01 Jun 2023 04:59) (71 - 81)  BP: 165/83 (01 Jun 2023 04:59) (137/69 - 165/83)  BP(mean): --  RR: 18 (01 Jun 2023 04:59) (18 - 18)  SpO2: 93% (01 Jun 2023 04:59) (93% - 96%)    Parameters below as of 01 Jun 2023 04:59  Patient On (Oxygen Delivery Method): room air      PHYSICAL EXAM:  General: NAD, resting in bed, on RA  Lungs: clear to auscultation in anterior lung fields, no wheezes  Heart: +s1/s2, RRR, no murmurs or gallops  Abdomen: soft, non-distended, non-tender to palpation, no rigidity  Extremities: no peripheral edema bilaterally    LABS:                        9.9    5.09  )-----------( 256      ( 01 Jun 2023 06:55 )             31.0     06-01    137  |  101  |  13  ----------------------------<  109<H>  4.0   |  25  |  0.94    Ca    9.7      01 Jun 2023 06:55  Phos  3.2     06-01  Mg     2.0     06-01    TPro  7.4  /  Alb  3.6  /  TBili  0.2  /  DBili  x   /  AST  21  /  ALT  15  /  AlkPhos  104  06-01                RADIOLOGY & ADDITIONAL TESTS:  Results Reviewed:   Imaging Personally Reviewed:  Electrocardiogram Personally Reviewed:    COORDINATION OF CARE:  Care Discussed with Consultants/Other Providers [Y/N]:  Prior or Outpatient Records Reviewed [Y/N]:

## 2023-06-01 NOTE — PROGRESS NOTE ADULT - PROBLEM SELECTOR PLAN 3
- s/p CICU admission in 2014 requiring BiV ICD  - TTE: EF 51%, moderate DD, segmental abnormalities  - s/p IV lasix 40mg with improved dyspnea/orthopnea  > resume home lasix 40mg qd today  > strict I/Os  > daily BMP, maintain K>4, Mg>2

## 2023-06-01 NOTE — PROGRESS NOTE ADULT - REASON FOR ADMISSION
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 
Shortness of Breath

## 2023-06-02 ENCOUNTER — TRANSCRIPTION ENCOUNTER (OUTPATIENT)
Age: 73
End: 2023-06-02

## 2023-06-03 ENCOUNTER — TRANSCRIPTION ENCOUNTER (OUTPATIENT)
Age: 73
End: 2023-06-03

## 2023-06-07 ENCOUNTER — APPOINTMENT (OUTPATIENT)
Dept: CARE COORDINATION | Facility: HOME HEALTH | Age: 73
End: 2023-06-07
Payer: COMMERCIAL

## 2023-06-07 VITALS
BODY MASS INDEX: 18.94 KG/M2 | OXYGEN SATURATION: 99 % | DIASTOLIC BLOOD PRESSURE: 60 MMHG | HEIGHT: 68 IN | TEMPERATURE: 97.6 F | WEIGHT: 125 LBS | SYSTOLIC BLOOD PRESSURE: 140 MMHG | RESPIRATION RATE: 16 BRPM | HEART RATE: 84 BPM

## 2023-06-07 DIAGNOSIS — I50.9 HEART FAILURE, UNSPECIFIED: ICD-10-CM

## 2023-06-07 DIAGNOSIS — G47.00 INSOMNIA, UNSPECIFIED: ICD-10-CM

## 2023-06-07 DIAGNOSIS — I10 ESSENTIAL (PRIMARY) HYPERTENSION: ICD-10-CM

## 2023-06-07 DIAGNOSIS — J12.9 VIRAL PNEUMONIA, UNSPECIFIED: ICD-10-CM

## 2023-06-07 DIAGNOSIS — E11.9 TYPE 2 DIABETES MELLITUS W/OUT COMPLICATIONS: ICD-10-CM

## 2023-06-07 DIAGNOSIS — E11.40 TYPE 2 DIABETES MELLITUS WITH DIABETIC NEUROPATHY, UNSPECIFIED: ICD-10-CM

## 2023-06-07 PROCEDURE — 99495 TRANSJ CARE MGMT MOD F2F 14D: CPT

## 2023-06-09 PROBLEM — E11.40 TYPE 2 DIABETES MELLITUS WITH DIABETIC NEUROPATHY, WITHOUT LONG-TERM CURRENT USE OF INSULIN: Status: ACTIVE | Noted: 2023-06-09

## 2023-06-09 PROBLEM — J12.9 VIRAL PNEUMONIA: Status: ACTIVE | Noted: 2023-06-09

## 2023-06-09 PROBLEM — G47.00 INSOMNIA: Status: ACTIVE | Noted: 2023-06-09

## 2023-06-09 RX ORDER — ALBUTEROL SULFATE 108 UG/1
108 (90 BASE) AEROSOL, METERED RESPIRATORY (INHALATION)
Refills: 0 | Status: ACTIVE | COMMUNITY
Start: 2023-06-09

## 2023-06-09 RX ORDER — GABAPENTIN 300 MG/1
300 CAPSULE ORAL TWICE DAILY
Refills: 0 | Status: ACTIVE | COMMUNITY
Start: 2023-06-09

## 2023-06-09 RX ORDER — TRAZODONE HYDROCHLORIDE 100 MG/1
100 TABLET ORAL
Refills: 0 | Status: ACTIVE | COMMUNITY
Start: 2023-06-09

## 2023-06-09 RX ORDER — REPAGLINIDE 1 MG/1
1 TABLET ORAL 3 TIMES DAILY
Refills: 0 | Status: ACTIVE | COMMUNITY
Start: 2023-06-09

## 2023-06-09 RX ORDER — CARVEDILOL 25 MG/1
25 TABLET, FILM COATED ORAL TWICE DAILY
Refills: 0 | Status: ACTIVE | COMMUNITY
Start: 2023-06-09

## 2023-06-09 RX ORDER — LOSARTAN POTASSIUM 50 MG/1
50 TABLET, FILM COATED ORAL DAILY
Refills: 0 | Status: ACTIVE | COMMUNITY
Start: 2018-03-16

## 2023-06-09 RX ORDER — BENZONATATE 100 MG/1
100 CAPSULE ORAL 3 TIMES DAILY
Refills: 0 | Status: ACTIVE | COMMUNITY
Start: 2023-06-09

## 2023-06-09 RX ORDER — FUROSEMIDE 40 MG/1
40 TABLET ORAL DAILY
Refills: 0 | Status: ACTIVE | COMMUNITY
Start: 2023-06-09

## 2023-06-09 NOTE — HISTORY OF PRESENT ILLNESS
[Post-hospitalization from ___ Hospital] : Post-hospitalization from [unfilled] Hospital [Admitted on: ___] : The patient was admitted on [unfilled] [Discharged on ___] : discharged on [unfilled] [Discharge Summary] : discharge summary [Discharge Med List] : discharge medication list [Med Reconciliation] : medication reconciliation has been completed [Patient Contacted By: ____] : and contacted by [unfilled] [FreeTextEntry2] : 72 yo male seen today for TCM initial visit. He is awake, alert and oriented x 3, in no acute distress. Ambulatory w/rolling walker and limp to left leg post ORIF April after fall down stairs. He has been between rehab and hospitals since April 2023. Today he c/o chronic pain to left hip, exacerbated after PT session on Monday. 6/10 relieved with Tylenol and rest. Next PT is 6/8/23. [facility] SOC done, no HHA as per pt.  Pt lives with his wife in large home with numerous stairs to enter the home, he has not left home since returning from hospital. Wife assist with personal care, he is supported by his son, and son Mark and family are currently staying with him to assist as needed. He reports he takes all medications as prescribed and will keep his MD appts, son Dieudonne will take him. \par Son and patient educated on TCM and yellow card left in the home.

## 2023-06-09 NOTE — PHYSICAL EXAM
[No Acute Distress] : no acute distress [Well Nourished] : well nourished [Well Developed] : well developed [Normal Voice/Communication] : normal voice/communication [Normal Sclera/Conjunctiva] : normal sclera/conjunctiva [EOMI] : extraocular movements intact [Normal Outer Ear/Nose] : the outer ears and nose were normal in appearance [Normal Oropharynx] : the oropharynx was normal [No JVD] : no jugular venous distention [Supple] : supple [No Respiratory Distress] : no respiratory distress  [No Accessory Muscle Use] : no accessory muscle use [Normal S1, S2] : normal S1 and S2 [Pedal Pulses Present] : the pedal pulses are present [No Edema] : there was no peripheral edema [Soft] : abdomen soft [Non Tender] : non-tender [Non-distended] : non-distended [Normal Bowel Sounds] : normal bowel sounds [No CVA Tenderness] : no CVA  tenderness [Grossly Normal Strength/Tone] : grossly normal strength/tone [No Rash] : no rash [Coordination Grossly Intact] : coordination grossly intact [de-identified] : inspiratory wheezing with non productive cough [de-identified] : SMITA hung

## 2023-06-09 NOTE — HEALTH RISK ASSESSMENT
[Yes] : Yes [2 - 3 times a week (3 pts)] : 2 - 3  times a week (3 points) [3 or 4 (1 pt)] : 3 or 4  (1 point) [Never (0 pts)] : Never (0 points) [No] : In the past 12 months have you used drugs other than those required for medical reasons? No [Any fall with injury in past year] : Patient reported fall with injury in the past year [Assistive Device] : Patient uses an assistive device [0] : 2) Feeling down, depressed, or hopeless: Not at all (0) [PHQ-2 Negative - No further assessment needed] : PHQ-2 Negative - No further assessment needed [Low Carb Diet] : low carbohydrate [Low Salt Diet] : low salt [General Adherence] : general adherence [None] : None [With Significant Other] : lives with significant other [# of Members in Household ___] :  household currently consist of [unfilled] member(s) [Employed] : employed [High School] : high school [] :  [Feels Safe at Home] : Feels safe at home [Reports changes in vision] : Reports changes in vision [Reports normal functional visual acuity (ie: able to read med bottle)] : Reports normal functional visual acuity [Designated Healthcare Proxy] : Designated healthcare proxy [Name: ___] : Health Care Proxy's Name: [unfilled]  [Relationship: ___] : Relationship: [unfilled] [Aggressive treatment] : aggressive treatment [I will adhere to the patient's wishes.] : I will adhere to the patient's wishes. [Time Spent: ___ minutes] : Time Spent: [unfilled] minutes [Current] : Current [20 or more] : 20 or more [Audit-CScore] : 4 [de-identified] : pt states he "will try to cut down on drinking" declined assistance [de-identified] : walks around the home gayathri [de-identified] : no salt [de-identified] : reji walker [facility] [RDP2Duzqa] : 0 [Change in mental status noted] : No change in mental status noted [Language] : denies difficulty with language [Behavior] : denies difficulty with behavior [Learning/Retaining New Information] : denies difficulty learning/retaining new information [Handling Complex Tasks] : denies difficulty handling complex tasks [Reasoning] : denies difficulty with reasoning [Spatial Ability and Orientation] : denies difficulty with spatial ability and orientation [Reports changes in hearing] : Reports no changes in hearing [Reports changes in dental health] : Reports no changes in dental health [de-identified] : decreased vision left eye [AdvancecareDate] : 06/23 [de-identified] : 3 cigarettes per day

## 2023-06-09 NOTE — REVIEW OF SYSTEMS
[Recent Change In Weight] : ~T recent weight change [Vision Problems] : vision problems [Cough] : cough [Constipation] : constipation [Joint Pain] : joint pain [Joint Stiffness] : joint stiffness [Negative] : Psychiatric [Fever] : no fever [Fatigue] : no fatigue [Discharge] : no discharge [Pain] : no pain [Dryness] : no dryness [Hearing Loss] : no hearing loss [Sore Throat] : no sore throat [Chest Pain] : no chest pain [Lower Ext Edema] : no lower extremity edema [Orthopena] : no orthopnea [Shortness Of Breath] : no shortness of breath [Wheezing] : no wheezing [Dyspnea on Exertion] : not dyspnea on exertion [Abdominal Pain] : no abdominal pain [Nausea] : no nausea [Diarrhea] : no diarrhea [Vomiting] : no vomiting [Heartburn] : no heartburn [Melena] : no melena [Dysuria] : no dysuria [Incontinence] : no incontinence [Hesitancy] : no hesitancy [Nocturia] : no nocturia [Hematuria] : no hematuria [Frequency] : no frequency [FreeTextEntry2] : pt reports appx 6lb wt loss since June no change to intake [FreeTextEntry3] : decreased vision in left eye [FreeTextEntry7] : states he takes "[place]and[place] medicine" for daily BM [FreeTextEntry9] : left hip

## 2023-06-09 NOTE — PLAN
[FreeTextEntry1] : f/u with Dr Izquierdo on 6/16/23\par f/u with Dr Izquierdo on 6/16/23\par f/u with EPS on 7/25/23\par make eye and podiatry appt

## 2023-06-16 ENCOUNTER — APPOINTMENT (OUTPATIENT)
Dept: CARDIOLOGY | Facility: CLINIC | Age: 73
End: 2023-06-16

## 2023-06-19 ENCOUNTER — APPOINTMENT (OUTPATIENT)
Dept: ORTHOPEDIC SURGERY | Facility: CLINIC | Age: 73
End: 2023-06-19
Payer: COMMERCIAL

## 2023-06-19 PROCEDURE — 99024 POSTOP FOLLOW-UP VISIT: CPT

## 2023-06-19 PROCEDURE — 73502 X-RAY EXAM HIP UNI 2-3 VIEWS: CPT

## 2023-06-19 NOTE — HISTORY OF PRESENT ILLNESS
[de-identified] : s/p ORIF of left hip [de-identified] : The patient is a 73 year male who returns for the 1st postoperative visit after undergoing ORIF of left hip at [facility]. The surgery was on 04/29/2023. The patient is recovering at home. He reports postoperative pain for which he is taking Tylenol. He notes pain laterally to the hip. He had received 4 sessions of PT at home.  [de-identified] : Patient is WDWN, alert, and in no acute distress. Breathing is unlabored. He is grossly oriented to person, place, and time.\par \par Left Hip:\par He presets ambulating with the assistance of a cane. \par Dissolvable sutures in place.\par Incision site is healing well, without signs of postoperative infection noted.  [de-identified] : AP and lateral views of the LEFT hip and pelvis were obtained today and revealed an intertrochanteric fracture stabilized by 12 x 170, 130 degrees trochanteric femoral nail with a 95 spiral blade and a 5 x 36 mm locking bolt. The fracture is healing well.  [de-identified] : At this time, I recommended use of VItamin E oil on the scar. He was instructed on low impact activities such as walking, as I told him this is the best therapy for regaining motion and function. I recommended use of Calcium Citrate, Vitamin D3 and Vitamin C to promote bone health and healing. Tylenol prn. \par Follow up in 3 months for repeat xrays.

## 2023-06-19 NOTE — ADDENDUM
[FreeTextEntry1] : I, Latia Mast wrote this note acting as a scribe for Dr. Quintin Castrejon on Jun 19, 2023.

## 2023-06-19 NOTE — END OF VISIT
[FreeTextEntry3] : All medical record entries made by the Scribe were at my,  Dr. Quintin Castrejon MD., direction and personally dictated by me on 06/19/2023. I have personally reviewed the chart and agree that the record accurately reflects my personal performance of the history, physical exam, assessment and plan.

## 2023-06-27 ENCOUNTER — TRANSCRIPTION ENCOUNTER (OUTPATIENT)
Age: 73
End: 2023-06-27

## 2023-07-25 ENCOUNTER — APPOINTMENT (OUTPATIENT)
Dept: ELECTROPHYSIOLOGY | Facility: CLINIC | Age: 73
End: 2023-07-25
Payer: COMMERCIAL

## 2023-07-25 ENCOUNTER — NON-APPOINTMENT (OUTPATIENT)
Age: 73
End: 2023-07-25

## 2023-07-25 PROCEDURE — 93295 DEV INTERROG REMOTE 1/2/MLT: CPT

## 2023-07-25 PROCEDURE — 93296 REM INTERROG EVL PM/IDS: CPT

## 2023-09-15 NOTE — PHYSICAL THERAPY INITIAL EVALUATION ADULT - NAME OF DISCHARGE PLANNER
Please bring in a copy of your advance directive at your next visit, or drop off a copy at any [facility] facility so that it can be added to your medical record.    
Zenia

## 2023-09-21 ENCOUNTER — APPOINTMENT (OUTPATIENT)
Dept: ORTHOPEDIC SURGERY | Facility: CLINIC | Age: 73
End: 2023-09-21
Payer: COMMERCIAL

## 2023-09-21 VITALS — BODY MASS INDEX: 19.1 KG/M2 | WEIGHT: 126 LBS | HEIGHT: 68 IN

## 2023-09-21 DIAGNOSIS — S72.142D DISPLACED INTERTROCHANTERIC FRACTURE OF LEFT FEMUR, SUBSEQUENT ENCOUNTER FOR CLOSED FRACTURE WITH ROUTINE HEALING: ICD-10-CM

## 2023-09-21 DIAGNOSIS — S72.142G: ICD-10-CM

## 2023-09-21 PROCEDURE — 99213 OFFICE O/P EST LOW 20 MIN: CPT

## 2023-09-21 PROCEDURE — 73502 X-RAY EXAM HIP UNI 2-3 VIEWS: CPT

## 2023-10-24 ENCOUNTER — NON-APPOINTMENT (OUTPATIENT)
Age: 73
End: 2023-10-24

## 2023-10-24 ENCOUNTER — APPOINTMENT (OUTPATIENT)
Dept: ELECTROPHYSIOLOGY | Facility: CLINIC | Age: 73
End: 2023-10-24
Payer: COMMERCIAL

## 2023-10-24 PROCEDURE — 93295 DEV INTERROG REMOTE 1/2/MLT: CPT

## 2023-10-24 PROCEDURE — 93296 REM INTERROG EVL PM/IDS: CPT

## 2024-01-23 ENCOUNTER — APPOINTMENT (OUTPATIENT)
Dept: ELECTROPHYSIOLOGY | Facility: CLINIC | Age: 74
End: 2024-01-23

## 2024-02-22 ENCOUNTER — APPOINTMENT (OUTPATIENT)
Dept: ELECTROPHYSIOLOGY | Facility: CLINIC | Age: 74
End: 2024-02-22

## 2024-03-04 NOTE — ED PROVIDER NOTE - CONSULTING PHYSICIAN
Render In Strict Bullet Format?: No Detail Level: Zone Plan: Start RX: Apply pea size amount (thin layer) to the face. Begin using every other night, advance to every night as tolerated ortho

## 2024-03-22 ENCOUNTER — APPOINTMENT (OUTPATIENT)
Dept: ELECTROPHYSIOLOGY | Facility: CLINIC | Age: 74
End: 2024-03-22

## 2024-04-25 ENCOUNTER — APPOINTMENT (OUTPATIENT)
Dept: ELECTROPHYSIOLOGY | Facility: CLINIC | Age: 74
End: 2024-04-25

## 2024-06-11 NOTE — H&P ADULT - TIME BILLING
Prior PCP, Dr. Ritchie, graduating from residency this month.  Has f/up appt scheduled on 7/2/24  Reviewed chart and PDMP  Renewed meds     Sanjana Escobar MD     Chart review, alternative chart review, outpatient chart review, patient interview, updating family, discussion with consultants, orders, documentation

## 2024-07-25 ENCOUNTER — APPOINTMENT (OUTPATIENT)
Dept: ELECTROPHYSIOLOGY | Facility: CLINIC | Age: 74
End: 2024-07-25

## 2024-08-30 ENCOUNTER — APPOINTMENT (OUTPATIENT)
Dept: ELECTROPHYSIOLOGY | Facility: CLINIC | Age: 74
End: 2024-08-30

## 2024-09-30 ENCOUNTER — APPOINTMENT (OUTPATIENT)
Dept: ELECTROPHYSIOLOGY | Facility: CLINIC | Age: 74
End: 2024-09-30

## 2024-09-30 ENCOUNTER — NON-APPOINTMENT (OUTPATIENT)
Age: 74
End: 2024-09-30

## 2024-09-30 VITALS
WEIGHT: 130 LBS | SYSTOLIC BLOOD PRESSURE: 177 MMHG | HEART RATE: 68 BPM | OXYGEN SATURATION: 100 % | DIASTOLIC BLOOD PRESSURE: 72 MMHG | HEIGHT: 68 IN | BODY MASS INDEX: 19.7 KG/M2

## 2024-09-30 PROCEDURE — 93284 PRGRMG EVAL IMPLANTABLE DFB: CPT

## 2024-09-30 PROCEDURE — 93000 ELECTROCARDIOGRAM COMPLETE: CPT | Mod: 59

## 2024-12-30 ENCOUNTER — APPOINTMENT (OUTPATIENT)
Dept: ELECTROPHYSIOLOGY | Facility: CLINIC | Age: 74
End: 2024-12-30
Payer: COMMERCIAL

## 2024-12-30 ENCOUNTER — NON-APPOINTMENT (OUTPATIENT)
Age: 74
End: 2024-12-30

## 2024-12-30 PROCEDURE — 93296 REM INTERROG EVL PM/IDS: CPT

## 2024-12-30 PROCEDURE — 93295 DEV INTERROG REMOTE 1/2/MLT: CPT

## 2025-04-03 ENCOUNTER — NON-APPOINTMENT (OUTPATIENT)
Age: 75
End: 2025-04-03

## 2025-04-03 ENCOUNTER — APPOINTMENT (OUTPATIENT)
Dept: ELECTROPHYSIOLOGY | Facility: CLINIC | Age: 75
End: 2025-04-03
Payer: MEDICARE

## 2025-04-03 PROCEDURE — 93295 DEV INTERROG REMOTE 1/2/MLT: CPT

## 2025-04-03 PROCEDURE — 93296 REM INTERROG EVL PM/IDS: CPT

## 2025-04-09 NOTE — ED ADULT NURSE NOTE - CCCP TRG CHIEF CMPLNT
Patient's partner called requesting patient be switched to a provider in [place] office. Patient states she sees a provider [place] and due to age and health it's easier on them to be located in the same office. Patient states that while they were advised that they would be seen in [place] location, they now feel it's too far away and too hard for them to get there.    Advised patient that I will work with clinical admin to see the options and call her back.    If patient calls back, please transfer to [phone]   shortness of breath

## 2025-07-03 ENCOUNTER — APPOINTMENT (OUTPATIENT)
Dept: ELECTROPHYSIOLOGY | Facility: CLINIC | Age: 75
End: 2025-07-03

## 2025-07-03 ENCOUNTER — NON-APPOINTMENT (OUTPATIENT)
Age: 75
End: 2025-07-03

## 2025-07-03 PROCEDURE — 93295 DEV INTERROG REMOTE 1/2/MLT: CPT

## 2025-07-03 PROCEDURE — 93296 REM INTERROG EVL PM/IDS: CPT

## (undated) DEVICE — ELCTR AQUAMANTYS BIPOLAR SEALER 6.0

## (undated) DEVICE — FOLEY TRAY 16FR 5CC LF LUBRISIL ADVANCE TEMP CLOSED

## (undated) DEVICE — SAW BLADE MICROAIRE STERNUM 1X34X9.4MM

## (undated) DEVICE — GLV 7.5 PROTEXIS (WHITE)

## (undated) DEVICE — SOL IRR BAG NS 0.9% 3000ML

## (undated) DEVICE — DRAPE 3/4 SHEET W REINFORCEMENT 56X77"

## (undated) DEVICE — SUT POLYSORB 2-0 30" GS-21 UNDYED

## (undated) DEVICE — GUIDEWIRE SYNTHES 3.2MM X 400MM

## (undated) DEVICE — SOL IRR POUR NS 0.9% 500ML

## (undated) DEVICE — WARMING BLANKET DUO-THERM HYPER/HYPOTHERM ADULT

## (undated) DEVICE — DRSG DERMABOND PRINEO 60CM

## (undated) DEVICE — ELCTR PLASMA BLADE X 4.0MM

## (undated) DEVICE — DRSG TAPE MICROFOAM 3"

## (undated) DEVICE — GLV 9 DURAPRENE

## (undated) DEVICE — SUT POLYSORB 0 36" GS-25 UNDYED

## (undated) DEVICE — SPECIMEN CONTAINER 100ML

## (undated) DEVICE — GLV 6.5 PROTEXIS (WHITE)

## (undated) DEVICE — STRYKER INTERPULSE HANDPIECE W IRR SUCTION TUBE

## (undated) DEVICE — WARMING BLANKET UPPER ADULT

## (undated) DEVICE — MARKING PEN W RULER

## (undated) DEVICE — KIT STYLET 65CM

## (undated) DEVICE — STEALTH CLAMP INSERT FIBRA/FIBRA 90MM

## (undated) DEVICE — DRSG WEBRIL 6"

## (undated) DEVICE — STAPLER SKIN VISI-STAT 35 WIDE

## (undated) DEVICE — DRSG OPSITE 13.75 X 4"

## (undated) DEVICE — GLV 7 PROTEXIS (WHITE)

## (undated) DEVICE — MEDICATION LABELS W MARKER

## (undated) DEVICE — SUCTION YANKAUER NO CONTROL VENT

## (undated) DEVICE — PACK HIP PINNING

## (undated) DEVICE — LAP PAD 18 X 18"

## (undated) DEVICE — SYR LUER LOK 10CC

## (undated) DEVICE — VENODYNE/SCD SLEEVE CALF LARGE

## (undated) DEVICE — SUT DOUBLE 6 WIRE STERNAL

## (undated) DEVICE — GLV 8 PROTEXIS (WHITE)

## (undated) DEVICE — DRILL BIT SYNTHES ORTHO CALIBRATED 65MM 4X260MM

## (undated) DEVICE — DRSG ACE BANDAGE 6"

## (undated) DEVICE — GLV 8.5 PROTEXIS (WHITE)

## (undated) DEVICE — DRAPE C ARM UNIVERSAL

## (undated) DEVICE — PACK UNIVERSAL CARDIAC

## (undated) DEVICE — SUT BLUNT SZ 5

## (undated) DEVICE — SUT POLYSORB 1 36" GS-21 UNDYED

## (undated) DEVICE — BLADE SCALPEL SAFETYLOCK #10

## (undated) DEVICE — DEFIBRILLATOR PAD PRE-CONNECT ADULT/CHILD

## (undated) DEVICE — DRAPE MAYO STAND 30"

## (undated) DEVICE — POSITIONER FOAM EGG CRATE ULNAR 2PCS (PINK)

## (undated) DEVICE — VISITEC 4X4

## (undated) DEVICE — BLADE SCALPEL SAFETYLOCK #15

## (undated) DEVICE — GOWN TRIMAX LG

## (undated) DEVICE — SYR ASEPTO

## (undated) DEVICE — TOURNIQUET CUFF 34" SINGLE PORT W PLC (BLACK)

## (undated) DEVICE — DRAPE INSTRUMENT POUCH 6.75" X 11"

## (undated) DEVICE — SUT STAINLESS STEEL 5 18" SCC

## (undated) DEVICE — TUBING BRAT 2 SUCTION ASSEMBLY TWIST LOCK

## (undated) DEVICE — SUT BIOSYN 4-0 18" P-12

## (undated) DEVICE — DRAPE IOBAN 23" X 23"

## (undated) DEVICE — SUT PLEDGET PRE PUNCH 4.8 X 9.5 X 1.5 MM

## (undated) DEVICE — DRAPE TOWEL BLUE 17" X 24"

## (undated) DEVICE — NDL HYPO REGULAR BEVEL 22G X 1.5" (TURQUOISE)

## (undated) DEVICE — SUT SURGICAL STEEL 6 30" BP-1

## (undated) DEVICE — STEALTH CLAMP INSERT FIBRA/FIBRA 60MM

## (undated) DEVICE — SUT SOFSILK 0 30" V-20

## (undated) DEVICE — BLADE SCALPEL SAFETYLOCK #11

## (undated) DEVICE — DRAPE 1/2 SHEET 40X57"

## (undated) DEVICE — FOLEY TRAY 16FR 5CC LTX UMETER CLOSED

## (undated) DEVICE — DRSG AQUACEL 3.5 X 6"

## (undated) DEVICE — SUT ETHIBOND 0 44" EN3

## (undated) DEVICE — TAPE SILK 3"

## (undated) DEVICE — SUT SOFSILK 0 18" TIES

## (undated) DEVICE — WARMING BLANKET FULL UNDERBODY

## (undated) DEVICE — PREP CHLORAPREP HI-LITE ORANGE 26ML

## (undated) DEVICE — SOL IRR POUR H2O 250ML

## (undated) DEVICE — SUT SOFSILK 2-0 18" TIES

## (undated) DEVICE — SUT POLYSORB 0 30" GS-21 UNDYED

## (undated) DEVICE — NDL HYPO SAFE 25G X 5/8" (ORANGE)

## (undated) DEVICE — SOL NORMOSOL-R PH7.4 1000ML

## (undated) DEVICE — DRAIN JACKSON PRATT 3 SPRING RESERVOIR W 10FR PVC DRAIN